# Patient Record
Sex: MALE | Race: WHITE | NOT HISPANIC OR LATINO | Employment: OTHER | URBAN - METROPOLITAN AREA
[De-identification: names, ages, dates, MRNs, and addresses within clinical notes are randomized per-mention and may not be internally consistent; named-entity substitution may affect disease eponyms.]

---

## 2017-11-08 ENCOUNTER — HOSPITAL ENCOUNTER (INPATIENT)
Facility: HOSPITAL | Age: 57
LOS: 18 days | Discharge: HOME | DRG: 913 | End: 2017-11-28
Attending: EMERGENCY MEDICINE | Admitting: INTERNAL MEDICINE
Payer: MEDICARE

## 2017-11-08 ENCOUNTER — APPOINTMENT (OUTPATIENT)
Dept: RADIOLOGY | Facility: HOSPITAL | Age: 57
DRG: 913 | End: 2017-11-08
Payer: MEDICARE

## 2017-11-08 DIAGNOSIS — N30.00 ACUTE CYSTITIS WITHOUT HEMATURIA: ICD-10-CM

## 2017-11-08 DIAGNOSIS — T17.500A MUCUS PLUGGING OF BRONCHI: ICD-10-CM

## 2017-11-08 DIAGNOSIS — R20.0 ANESTHESIA: ICD-10-CM

## 2017-11-08 DIAGNOSIS — S09.90XA HEAD INJURY: ICD-10-CM

## 2017-11-08 DIAGNOSIS — J69.0 ASPIRATION PNEUMONIA (HCC): ICD-10-CM

## 2017-11-08 DIAGNOSIS — J96.01 ACUTE RESPIRATORY FAILURE WITH HYPOXIA (HCC): ICD-10-CM

## 2017-11-08 DIAGNOSIS — E87.0 HYPERNATREMIA: ICD-10-CM

## 2017-11-08 DIAGNOSIS — F79 MENTAL RETARDATION: ICD-10-CM

## 2017-11-08 DIAGNOSIS — R29.6 FREQUENT FALLS: Primary | ICD-10-CM

## 2017-11-08 LAB
ALBUMIN SERPL BCP-MCNC: 2.8 G/DL (ref 3.5–5)
ALP SERPL-CCNC: 131 U/L (ref 46–116)
ALT SERPL W P-5'-P-CCNC: 51 U/L (ref 12–78)
ANION GAP SERPL CALCULATED.3IONS-SCNC: 13 MMOL/L (ref 4–13)
AST SERPL W P-5'-P-CCNC: 88 U/L (ref 5–45)
BASOPHILS # BLD AUTO: 0 THOUSANDS/ΜL (ref 0–0.1)
BASOPHILS NFR BLD AUTO: 0 % (ref 0–1)
BILIRUB SERPL-MCNC: 0.4 MG/DL (ref 0.2–1)
BUN SERPL-MCNC: 18 MG/DL (ref 5–25)
CALCIUM SERPL-MCNC: 9.4 MG/DL (ref 8.3–10.1)
CHLORIDE SERPL-SCNC: 109 MMOL/L (ref 100–108)
CO2 SERPL-SCNC: 28 MMOL/L (ref 21–32)
CREAT SERPL-MCNC: 0.3 MG/DL (ref 0.6–1.3)
EOSINOPHIL # BLD AUTO: 0 THOUSAND/ΜL (ref 0–0.61)
EOSINOPHIL NFR BLD AUTO: 0 % (ref 0–6)
ERYTHROCYTE [DISTWIDTH] IN BLOOD BY AUTOMATED COUNT: 15.9 % (ref 11.6–15.1)
GFR SERPL CREATININE-BSD FRML MDRD: 147 ML/MIN/1.73SQ M
GLUCOSE SERPL-MCNC: 104 MG/DL (ref 65–140)
HCT VFR BLD AUTO: 45.3 % (ref 42–52)
HGB BLD-MCNC: 14.9 G/DL (ref 14–18)
LYMPHOCYTES # BLD AUTO: 0.9 THOUSANDS/ΜL (ref 0.6–4.47)
LYMPHOCYTES NFR BLD AUTO: 8 % (ref 14–44)
MCH RBC QN AUTO: 28.4 PG (ref 27–31)
MCHC RBC AUTO-ENTMCNC: 32.8 G/DL (ref 31.4–37.4)
MCV RBC AUTO: 87 FL (ref 82–98)
MONOCYTES # BLD AUTO: 0.7 THOUSAND/ΜL (ref 0.17–1.22)
MONOCYTES NFR BLD AUTO: 7 % (ref 4–12)
NEUTROPHILS # BLD AUTO: 9.5 THOUSANDS/ΜL (ref 1.85–7.62)
NEUTS SEG NFR BLD AUTO: 85 % (ref 43–75)
NRBC BLD AUTO-RTO: 0 /100 WBCS
PLATELET # BLD AUTO: 208 THOUSANDS/UL (ref 130–400)
PLATELET BLD QL SMEAR: ADEQUATE
PMV BLD AUTO: 8.1 FL (ref 8.9–12.7)
POTASSIUM SERPL-SCNC: 4.4 MMOL/L (ref 3.5–5.3)
PROT SERPL-MCNC: 7.8 G/DL (ref 6.4–8.2)
RBC # BLD AUTO: 5.23 MILLION/UL (ref 4.7–6.1)
SODIUM SERPL-SCNC: 150 MMOL/L (ref 136–145)
TSH SERPL DL<=0.05 MIU/L-ACNC: 2.23 UIU/ML (ref 0.36–3.74)
WBC # BLD AUTO: 11.1 THOUSAND/UL (ref 4.8–10.8)

## 2017-11-08 PROCEDURE — 99284 EMERGENCY DEPT VISIT MOD MDM: CPT

## 2017-11-08 PROCEDURE — 36415 COLL VENOUS BLD VENIPUNCTURE: CPT | Performed by: EMERGENCY MEDICINE

## 2017-11-08 PROCEDURE — 84443 ASSAY THYROID STIM HORMONE: CPT | Performed by: EMERGENCY MEDICINE

## 2017-11-08 PROCEDURE — 80053 COMPREHEN METABOLIC PANEL: CPT | Performed by: EMERGENCY MEDICINE

## 2017-11-08 PROCEDURE — 93005 ELECTROCARDIOGRAM TRACING: CPT | Performed by: EMERGENCY MEDICINE

## 2017-11-08 PROCEDURE — 85025 COMPLETE CBC W/AUTO DIFF WBC: CPT | Performed by: EMERGENCY MEDICINE

## 2017-11-08 RX ORDER — PANTOPRAZOLE SODIUM 20 MG/1
20 TABLET, DELAYED RELEASE ORAL
Status: DISCONTINUED | OUTPATIENT
Start: 2017-11-09 | End: 2017-11-12

## 2017-11-08 RX ORDER — DIVALPROEX SODIUM 250 MG/1
750 TABLET, DELAYED RELEASE ORAL EVERY MORNING
Status: DISCONTINUED | OUTPATIENT
Start: 2017-11-09 | End: 2017-11-12

## 2017-11-08 RX ORDER — ESCITALOPRAM OXALATE 10 MG/1
20 TABLET ORAL DAILY
Status: DISCONTINUED | OUTPATIENT
Start: 2017-11-09 | End: 2017-11-28 | Stop reason: HOSPADM

## 2017-11-08 RX ORDER — ARIPIPRAZOLE 30 MG/1
30 TABLET ORAL DAILY
COMMUNITY

## 2017-11-08 RX ORDER — ACETAMINOPHEN 325 MG/1
650 TABLET ORAL EVERY 6 HOURS PRN
COMMUNITY
End: 2018-05-10

## 2017-11-08 RX ORDER — DIVALPROEX SODIUM 500 MG/1
750 TABLET, DELAYED RELEASE ORAL EVERY MORNING
COMMUNITY
End: 2017-11-28 | Stop reason: HOSPADM

## 2017-11-08 RX ORDER — LORAZEPAM 2 MG/ML
1 INJECTION INTRAMUSCULAR ONCE
Status: DISCONTINUED | OUTPATIENT
Start: 2017-11-08 | End: 2017-11-09

## 2017-11-08 RX ORDER — LORAZEPAM 2 MG/ML
0.5 INJECTION INTRAMUSCULAR EVERY 6 HOURS PRN
Status: DISCONTINUED | OUTPATIENT
Start: 2017-11-08 | End: 2017-11-12

## 2017-11-08 RX ORDER — ASPIRIN 81 MG/1
81 TABLET ORAL DAILY
COMMUNITY
End: 2018-05-10

## 2017-11-08 RX ORDER — DIVALPROEX SODIUM 250 MG/1
250 TABLET, DELAYED RELEASE ORAL
Status: DISCONTINUED | OUTPATIENT
Start: 2017-11-08 | End: 2017-11-12

## 2017-11-08 RX ORDER — DEXTROSE AND SODIUM CHLORIDE 5; .2 G/100ML; G/100ML
75 INJECTION, SOLUTION INTRAVENOUS CONTINUOUS
Status: DISCONTINUED | OUTPATIENT
Start: 2017-11-08 | End: 2017-11-09

## 2017-11-08 RX ORDER — ASPIRIN 81 MG/1
81 TABLET ORAL DAILY
Status: DISCONTINUED | OUTPATIENT
Start: 2017-11-09 | End: 2017-11-12

## 2017-11-08 RX ORDER — METOCLOPRAMIDE HYDROCHLORIDE 5 MG/ML
10 INJECTION INTRAMUSCULAR; INTRAVENOUS EVERY 8 HOURS SCHEDULED
Status: COMPLETED | OUTPATIENT
Start: 2017-11-08 | End: 2017-11-09

## 2017-11-08 RX ORDER — DIVALPROEX SODIUM 500 MG/1
500 TABLET, DELAYED RELEASE ORAL DAILY
COMMUNITY
End: 2017-11-28 | Stop reason: HOSPADM

## 2017-11-08 RX ORDER — ARIPIPRAZOLE 10 MG/1
30 TABLET ORAL DAILY
Status: DISCONTINUED | OUTPATIENT
Start: 2017-11-09 | End: 2017-11-28 | Stop reason: HOSPADM

## 2017-11-08 RX ORDER — ESCITALOPRAM OXALATE 20 MG/1
20 TABLET ORAL DAILY
COMMUNITY

## 2017-11-08 RX ORDER — KETOROLAC TROMETHAMINE 30 MG/ML
15 INJECTION, SOLUTION INTRAMUSCULAR; INTRAVENOUS EVERY 8 HOURS SCHEDULED
Status: COMPLETED | OUTPATIENT
Start: 2017-11-08 | End: 2017-11-11

## 2017-11-08 RX ORDER — DIVALPROEX SODIUM 500 MG/1
250 TABLET, DELAYED RELEASE ORAL
COMMUNITY
End: 2017-11-28 | Stop reason: HOSPADM

## 2017-11-08 RX ORDER — ATORVASTATIN CALCIUM 20 MG/1
20 TABLET, FILM COATED ORAL
Status: DISCONTINUED | OUTPATIENT
Start: 2017-11-08 | End: 2017-11-28 | Stop reason: HOSPADM

## 2017-11-08 RX ORDER — OMEPRAZOLE 20 MG/1
20 CAPSULE, DELAYED RELEASE ORAL
COMMUNITY
End: 2018-06-18

## 2017-11-08 RX ORDER — ATORVASTATIN CALCIUM 20 MG/1
20 TABLET, FILM COATED ORAL
COMMUNITY
End: 2018-05-10

## 2017-11-08 RX ORDER — DIVALPROEX SODIUM 250 MG/1
750 TABLET, DELAYED RELEASE ORAL
Status: DISCONTINUED | OUTPATIENT
Start: 2017-11-09 | End: 2017-11-12

## 2017-11-08 RX ORDER — DIVALPROEX SODIUM 500 MG/1
750 TABLET, DELAYED RELEASE ORAL
COMMUNITY
End: 2017-11-28 | Stop reason: HOSPADM

## 2017-11-08 RX ORDER — DIPHENHYDRAMINE HYDROCHLORIDE 50 MG/ML
25 INJECTION INTRAMUSCULAR; INTRAVENOUS EVERY 8 HOURS SCHEDULED
Status: COMPLETED | OUTPATIENT
Start: 2017-11-08 | End: 2017-11-09

## 2017-11-08 RX ORDER — ACETAMINOPHEN 325 MG/1
650 TABLET ORAL EVERY 6 HOURS PRN
Status: DISCONTINUED | OUTPATIENT
Start: 2017-11-08 | End: 2017-11-14

## 2017-11-08 RX ADMIN — KETOROLAC TROMETHAMINE 15 MG: 30 INJECTION, SOLUTION INTRAMUSCULAR at 22:03

## 2017-11-08 RX ADMIN — LORAZEPAM 0.5 MG: 2 INJECTION INTRAMUSCULAR; INTRAVENOUS at 21:20

## 2017-11-08 RX ADMIN — DIPHENHYDRAMINE HYDROCHLORIDE 25 MG: 50 INJECTION, SOLUTION INTRAMUSCULAR; INTRAVENOUS at 22:02

## 2017-11-08 RX ADMIN — DIVALPROEX SODIUM 250 MG: 250 TABLET, DELAYED RELEASE ORAL at 22:02

## 2017-11-08 RX ADMIN — ATORVASTATIN CALCIUM 20 MG: 20 TABLET, FILM COATED ORAL at 22:02

## 2017-11-08 RX ADMIN — METOCLOPRAMIDE 10 MG: 5 INJECTION, SOLUTION INTRAMUSCULAR; INTRAVENOUS at 22:04

## 2017-11-08 RX ADMIN — DEXTROSE AND SODIUM CHLORIDE 75 ML/HR: 5; .2 INJECTION, SOLUTION INTRAVENOUS at 21:13

## 2017-11-08 NOTE — H&P
H&P Exam - Sridhar Levine 61 y o  male MRN: [id number]    Unit/Bed#: ED 10 Encounter: [id number]      History of Present Illness     HPI:  Sridhar Levine is a 61 y o  male who presents with frequent falls  Patient is a resident of [facility] mentally retarded  Information is obtained from the records from [facility] and also from the physician who works there  Also information obtained from the emergency room physician and the records  Patient usually is independent ambulatory with no unsteadiness with his gait until it became apparent november 5th 2017  Patient had 3 head injuries on October 29th again on low blood 4th with left periorbital ecchymosis and again on no more 7 patient had an injury to the right forehead area with hematoma formation    Patient was sent to [facility] 2 times very had a CT scan done which came back unremarkable however there is a concern with his frequent falls and head injuries patient has been sent here for further evaluation and treatment  Patient has been evaluated in the emergency room and subsequently got admitted for further evaluation and treatment  There is no documentation of vomiting  In the emergency room patient has been very restless  Review of Systems   Reason unable to perform ROS: Patient with profound intellectual disability review of system could not be done  Historical Information   Past Medical History:   Diagnosis Date    Hyperlipidemia     Mental retardation     Osteoarthritis     Psychiatric disorder     atypical psychosis, impulse control disorder     History reviewed  No pertinent surgical history  History reviewed  No pertinent family history    Social History   History   Alcohol Use No     History   Drug Use No     History   Smoking Status    Never Smoker   Smokeless Tobacco    Never Used         Meds/Allergies     Allergies   Allergen Reactions    Haldol [Haloperidol]     Navane [Thiothixene]     Thorazine [Chlorpromazine]          (Not in a hospital admission)    Objective   Vitals: Pulse 104, temperature 97 8 °F (36 6 °C), temperature source Tympanic, resp  rate 20, weight 59 9 kg (132 lb)  No intake or output data in the 24 hours ending 11/08/17 1510    Invasive Devices          No matching active lines, drains, or airways          Physical Exam   Constitutional:   Patient is not a well-nourished is very restless   HENT:   Ecchymotic area noted on the occipital region 2 cm in diameter  Also ecchymotic areas noted on the frontal region both sides  Eyes: Right eye exhibits no discharge  Left eye exhibits no discharge  No scleral icterus  Patient has ecchymosis around the left eye pupils equal in size and reacting to light  Neck: Normal range of motion  Neck supple  No JVD present  No tracheal deviation present  No thyromegaly present  Cardiovascular: Normal rate and regular rhythm  Pulmonary/Chest: Effort normal and breath sounds normal    Abdominal: Soft  Bowel sounds are normal  He exhibits no distension  There is no tenderness  Musculoskeletal: He exhibits no edema, tenderness or deformity  Patient is moving all 4 extremities   Lymphadenopathy:     He has no cervical adenopathy  Neurological: He is alert  He has normal reflexes  No cranial nerve deficit  Coordination normal    Skin: Skin is warm and dry  Psychiatric: He has a normal mood and affect         Lab Results:   Recent Results (from the past 72 hour(s))   CBC and differential    Collection Time: 11/08/17  2:22 PM   Result Value Ref Range    WBC 11 10 (H) 4 80 - 10 80 Thousand/uL    RBC 5 23 4 70 - 6 10 Million/uL    Hemoglobin 14 9 14 0 - 18 0 g/dL    Hematocrit 45 3 42 0 - 52 0 %    MCV 87 82 - 98 fL    MCH 28 4 27 0 - 31 0 pg    MCHC 32 8 31 4 - 37 4 g/dL    RDW 15 9 (H) 11 6 - 15 1 %    MPV 8 1 (L) 8 9 - 12 7 fL    Platelets 461 398 - 973 Thousands/uL    nRBC 0 /100 WBCs    Neutrophils Relative 85 (H) 43 - 75 %    Lymphocytes Relative 8 (L) 14 - 44 %    Monocytes Relative 7 4 - 12 %    Eosinophils Relative 0 0 - 6 %    Basophils Relative 0 0 - 1 %    Neutrophils Absolute 9 50 (H) 1 85 - 7 62 Thousands/µL    Lymphocytes Absolute 0 90 0 60 - 4 47 Thousands/µL    Monocytes Absolute 0 70 0 17 - 1 22 Thousand/µL    Eosinophils Absolute 0 00 0 00 - 0 61 Thousand/µL    Basophils Absolute 0 00 0 00 - 0 10 Thousands/µL   TSH    Collection Time: 11/08/17  2:22 PM   Result Value Ref Range    TSH 3RD GENERATON 2 225 0 358 - 3 740 uIU/mL   Smear Review(Phlebs Do Not Order)    Collection Time: 11/08/17  2:22 PM   Result Value Ref Range    Platelet Estimate Adequate Adequate       Imaging:   XR chest 1 view portable    (Results Pending)       XR chest 1 view portable    (Results Pending)       EKG, Pathology, and Other Studies: I have personally reviewed pertinent reports  Assessment/Plan     Assessment:  Frequent falls resulting in head injury  Patient with unsteady gait  Mental retardation  Osteoarthritis  Hyperlipidemia  Hypernatremia    Plan:  Neuro checks  MRI  Neurology consultation  Resume the medications  IV fluids  Code Status: No Order  Advance Directive and Living Will:      Power of :    POLST:      Counseling / Coordination of Care  Total floor / unit time spent today 60 minutes  Greater than 50% of total time was spent with the patient and / or family counseling and / or coordination of care  A description of the counseling / coordination of care:  Discussed with the emergency room physician emergency room nurse staff on the floor discussed with the physician from the [facility] records reviewed  Marc Samaniego

## 2017-11-09 ENCOUNTER — APPOINTMENT (OUTPATIENT)
Dept: RADIOLOGY | Facility: HOSPITAL | Age: 57
DRG: 913 | End: 2017-11-09
Payer: MEDICARE

## 2017-11-09 LAB
ANION GAP SERPL CALCULATED.3IONS-SCNC: 10 MMOL/L (ref 4–13)
ATRIAL RATE: 111 BPM
BUN SERPL-MCNC: 22 MG/DL (ref 5–25)
CALCIUM SERPL-MCNC: 8.8 MG/DL (ref 8.3–10.1)
CHLORIDE SERPL-SCNC: 111 MMOL/L (ref 100–108)
CO2 SERPL-SCNC: 29 MMOL/L (ref 21–32)
CREAT SERPL-MCNC: 0.34 MG/DL (ref 0.6–1.3)
ERYTHROCYTE [DISTWIDTH] IN BLOOD BY AUTOMATED COUNT: 14.8 % (ref 11.6–15.1)
GFR SERPL CREATININE-BSD FRML MDRD: 140 ML/MIN/1.73SQ M
GLUCOSE SERPL-MCNC: 98 MG/DL (ref 65–140)
HCT VFR BLD AUTO: 38.7 % (ref 42–52)
HGB BLD-MCNC: 12.8 G/DL (ref 14–18)
MCH RBC QN AUTO: 28.4 PG (ref 27–31)
MCHC RBC AUTO-ENTMCNC: 33 G/DL (ref 31.4–37.4)
MCV RBC AUTO: 86 FL (ref 82–98)
P AXIS: 37 DEGREES
PLATELET # BLD AUTO: 145 THOUSANDS/UL (ref 130–400)
PMV BLD AUTO: 8.1 FL (ref 8.9–12.7)
POTASSIUM SERPL-SCNC: 4 MMOL/L (ref 3.5–5.3)
PR INTERVAL: 116 MS
QRS AXIS: -63 DEGREES
QRSD INTERVAL: 86 MS
QT INTERVAL: 352 MS
QTC INTERVAL: 478 MS
RBC # BLD AUTO: 4.5 MILLION/UL (ref 4.7–6.1)
SODIUM SERPL-SCNC: 150 MMOL/L (ref 136–145)
T WAVE AXIS: 81 DEGREES
VENTRICULAR RATE: 111 BPM
WBC # BLD AUTO: 8.4 THOUSAND/UL (ref 4.8–10.8)

## 2017-11-09 PROCEDURE — 85027 COMPLETE CBC AUTOMATED: CPT | Performed by: INTERNAL MEDICINE

## 2017-11-09 PROCEDURE — G8978 MOBILITY CURRENT STATUS: HCPCS

## 2017-11-09 PROCEDURE — G8997 SWALLOW GOAL STATUS: HCPCS

## 2017-11-09 PROCEDURE — 80048 BASIC METABOLIC PNL TOTAL CA: CPT | Performed by: INTERNAL MEDICINE

## 2017-11-09 PROCEDURE — 97163 PT EVAL HIGH COMPLEX 45 MIN: CPT

## 2017-11-09 PROCEDURE — G8996 SWALLOW CURRENT STATUS: HCPCS

## 2017-11-09 PROCEDURE — 70250 X-RAY EXAM OF SKULL: CPT

## 2017-11-09 PROCEDURE — 92610 EVALUATE SWALLOWING FUNCTION: CPT

## 2017-11-09 PROCEDURE — 71010 HB CHEST X-RAY 1 VIEW FRONTAL (PORTABLE): CPT

## 2017-11-09 PROCEDURE — G8979 MOBILITY GOAL STATUS: HCPCS

## 2017-11-09 RX ORDER — DEXTROSE MONOHYDRATE 50 MG/ML
75 INJECTION, SOLUTION INTRAVENOUS CONTINUOUS
Status: DISCONTINUED | OUTPATIENT
Start: 2017-11-09 | End: 2017-11-11

## 2017-11-09 RX ADMIN — DIVALPROEX SODIUM 250 MG: 250 TABLET, DELAYED RELEASE ORAL at 23:43

## 2017-11-09 RX ADMIN — ARIPIPRAZOLE 30 MG: 10 TABLET ORAL at 09:19

## 2017-11-09 RX ADMIN — ESCITALOPRAM OXALATE 20 MG: 10 TABLET ORAL at 09:17

## 2017-11-09 RX ADMIN — ASPIRIN 81 MG: 81 TABLET, COATED ORAL at 09:18

## 2017-11-09 RX ADMIN — KETOROLAC TROMETHAMINE 15 MG: 30 INJECTION, SOLUTION INTRAMUSCULAR at 06:33

## 2017-11-09 RX ADMIN — KETOROLAC TROMETHAMINE 15 MG: 30 INJECTION, SOLUTION INTRAMUSCULAR at 23:42

## 2017-11-09 RX ADMIN — KETOROLAC TROMETHAMINE 15 MG: 30 INJECTION, SOLUTION INTRAMUSCULAR at 15:20

## 2017-11-09 RX ADMIN — DIVALPROEX SODIUM 750 MG: 250 TABLET, DELAYED RELEASE ORAL at 09:18

## 2017-11-09 RX ADMIN — DIPHENHYDRAMINE HYDROCHLORIDE 25 MG: 50 INJECTION, SOLUTION INTRAMUSCULAR; INTRAVENOUS at 15:20

## 2017-11-09 RX ADMIN — ATORVASTATIN CALCIUM 20 MG: 20 TABLET, FILM COATED ORAL at 23:42

## 2017-11-09 RX ADMIN — DIPHENHYDRAMINE HYDROCHLORIDE 25 MG: 50 INJECTION, SOLUTION INTRAMUSCULAR; INTRAVENOUS at 06:33

## 2017-11-09 RX ADMIN — METOCLOPRAMIDE 10 MG: 5 INJECTION, SOLUTION INTRAMUSCULAR; INTRAVENOUS at 15:21

## 2017-11-09 RX ADMIN — METOCLOPRAMIDE 10 MG: 5 INJECTION, SOLUTION INTRAMUSCULAR; INTRAVENOUS at 06:32

## 2017-11-09 RX ADMIN — PANTOPRAZOLE SODIUM 20 MG: 20 TABLET, DELAYED RELEASE ORAL at 06:33

## 2017-11-09 RX ADMIN — LORAZEPAM 0.5 MG: 2 INJECTION INTRAMUSCULAR; INTRAVENOUS at 10:32

## 2017-11-09 RX ADMIN — DEXTROSE 75 ML/HR: 5 SOLUTION INTRAVENOUS at 12:47

## 2017-11-09 RX ADMIN — DIVALPROEX SODIUM 750 MG: 250 TABLET, DELAYED RELEASE ORAL at 15:29

## 2017-11-09 NOTE — NUTRITION
11/09/17 1129   Assessment   Timepoint Initial  (Non-violent Restraints)   Labs   List Completed Labs (Na: 150, Cl; 111, creat: 0 34  D5NS)   Feeding Route   PO Total feed   Swallow (noted liquids changed from thin to HTL this morning  Per 1:1, pt with difficulties with thin liquids at breakfast  ST following)   Adequacy of Intake   Nutrition Modality PO  (dysphagia 1- pureed, HTL)   Intake Meals %   Estimated Calorie Intake %   Estimated Protein Intake  %   Estimated Fluid Intake %   Estimated calorie intake compared to estimated need Observed breakfast tray with 100% Divehi toast, 50% cream of wheat  Will monitor     Nutrition Prognosis   Potential Fair   Nutrition Concerns (diet tolerance)   Comorbid Concerns None   Nutrition Precautions None   Nutrition Considerations (Education inappropriate d/t intellectual disability)   PES Statement   Problem Clinical   Functional (1) Swallowing difficulty NC-1 1   Related to Swallowing difficulty   As evidenced by: Need for Modified consistency   Patient Nutrition Goals   Goal weight maintained;meet PO needs;tolerate PO diet   Goal Status initiated   Timeframe to complete goal by next f/u   Recommendations/Interventions   Interventions Diet: continued as ordered   Nutrition Recommendations Continue diet as ordered   Nutrition Complexity Risk   Nutrition complexity level Moderate risk   Nutrition review: 11/13/17  (po intakes)   Follow up date 11/16/17

## 2017-11-09 NOTE — CONSULTS
Consultation - Nephrology   Germain Snell 61 y o  male MRN: [id number]  Unit/Bed#: [address] A Encounter: [id number]    ASSESSMENT and PLAN:  1  Hypernatremia: suspect due to poor oral free water intake  Will start D5W at 75 cc/hr  2  Developmental disability  3  Falls    SUMMARY OF RECOMMENDATIONS:  · Start IVF with D5W at 75 cc/hr  · Follow Na levels  Discussed with RN  HISTORY OF PRESENT ILLNESS:  Requesting Physician: Marie Moralez MD  Reason for Consult: Hypernatremia    Germain Snell is a 61 y o  male who was admitted to [facility] on 11/8/17 after presenting with frequent falls  A renal consultation is requested today for assistance in the management of hypernatremia  The information in this consult was obtained upon review of the electronic medical record as the patient is unable to give any type of meaningful history  From what I gather, the patient is a resident at the [facility] and is apparently ambulatory without gait issues at baseline  More recently, he has been having unsteady gait and has fallen multiple times prompting him to be brought to [facility] and subsequently admitted  He was found to have a Na of 150 on admission and we were consulted  He is currently on thickened liquids  Creatinine is stable at 0 3  PAST MEDICAL HISTORY:  Past Medical History:   Diagnosis Date    Deviated nasal septum     Eczema     Gastritis     Hyperlipidemia     Impulse control disorder     Insomnia     Mental retardation     Osteoarthritis     Psychiatric disorder     atypical psychosis, impulse control disorder    Ptosis of both eyelids     r > l    Scoliosis      PAST SURGICAL HISTORY:  History reviewed  No pertinent surgical history      ALLERGIES:  Allergies   Allergen Reactions    Haldol [Haloperidol]     Navane [Thiothixene]     Thorazine [Chlorpromazine]      SOCIAL HISTORY:  History   Alcohol Use No     History   Drug Use No     History   Smoking Status  Never Smoker   Smokeless Tobacco    Never Used     FAMILY HISTORY:  Cannot obtain due to mental status  MEDICATIONS:    Current Facility-Administered Medications:     acetaminophen (TYLENOL) tablet 650 mg, 650 mg, Oral, Q6H PRN, Johnathon Balderas MD    ARIPiprazole (ABILIFY) tablet 30 mg, 30 mg, Oral, Daily, Johnathon Balderas MD, 30 mg at 11/09/17 0919    aspirin (ECOTRIN LOW STRENGTH) EC tablet 81 mg, 81 mg, Oral, Daily, Johnathon Balderas MD, 81 mg at 11/09/17 0918    atorvastatin (LIPITOR) tablet 20 mg, 20 mg, Oral, HS, Johnathon Balderas MD, 20 mg at 11/08/17 2202    dextrose 5 % and sodium chloride 0 2 % infusion, 75 mL/hr, Intravenous, Continuous, Johnathon Balderas MD, Last Rate: 75 mL/hr at 11/08/17 2113, 75 mL/hr at 11/08/17 2113    diphenhydrAMINE (BENADRYL) injection 25 mg, 25 mg, Intravenous, Q8H [address], Alpesh Suarez MD, 25 mg at 11/09/17 4182    divalproex sodium (DEPAKOTE) EC tablet 250 mg, 250 mg, Oral, HS, Johnathon Balderas MD, 250 mg at 11/08/17 2202    divalproex sodium (DEPAKOTE) EC tablet 750 mg, 750 mg, Oral, QAM, Johnathon Balderas MD, 750 mg at 11/09/17 0918    divalproex sodium (DEPAKOTE) EC tablet 750 mg, 750 mg, Oral, After Lunch, Johnathon Balderas MD    escitalopram (LEXAPRO) tablet 20 mg, 20 mg, Oral, Daily, Johnathon Balderas MD, 20 mg at 11/09/17 0917    ketorolac (TORADOL) 30 mg/mL injection 15 mg, 15 mg, Intravenous, Q8H [address], Cyndee Mtz MD, 15 mg at 11/09/17 0633    LORazepam (ATIVAN) 2 mg/mL injection 0 5 mg, 0 5 mg, Intravenous, Q6H PRN, Johnathon Balderas MD, 0 5 mg at 11/09/17 1032    metoclopramide (REGLAN) injection 10 mg, 10 mg, Intravenous, Q8H [address], Alpesh Suarez MD, 10 mg at 11/09/17 5080    pantoprazole (PROTONIX) EC tablet 20 mg, 20 mg, Oral, Early Morning, Johnathon Balderas MD, 20 mg at 11/09/17 1786    REVIEW OF SYSTEMS:  Cannot obtain due to mental status       PHYSICAL EXAM:  Current Weight: Weight - Scale: 59 9 kg (132 lb) (transfer papers)  First Weight: Weight - Scale: 59 9 kg (132 lb) (transfer papers)  Vitals:    11/08/17 1309 11/08/17 1609 11/09/17 0700   BP:  113/69    Pulse: 104 (!) 108    Resp: 20 18    Temp: 97 8 °F (36 6 °C) 98 °F (36 7 °C)    TempSrc: Tympanic Tympanic    Weight: 59 9 kg (132 lb)     Height:   5' (1 524 m)     No intake or output data in the 24 hours ending 11/09/17 1221    General: asleep, comfortable, no distress  Skin: poor turgor  No rash  Eyes: pink conjunctivae, periorbital ecchymosis on the R    ENT: dry lips  Neck: no JVD  Chest/Lungs: clear breath sounds  CVS: distinct heart sounds, regular, normal rate  Abdomen: soft, non distended  Extremities: no edema  : deferred  Neuro: deferred  Psych: cannot obtain       Invasive Devices:      Lab Results:     Results from last 7 days  Lab Units 11/09/17  0609 11/08/17  1422   WBC Thousand/uL 8 40 11 10*   HEMOGLOBIN g/dL 12 8* 14 9   HEMATOCRIT % 38 7* 45 3   PLATELETS Thousands/uL 145 208   SODIUM mmol/L 150* 150*   POTASSIUM mmol/L 4 0 4 4   CHLORIDE mmol/L 111* 109*   CO2 mmol/L 29 28   BUN mg/dL 22 18   CREATININE mg/dL 0 34* 0 30*   CALCIUM mg/dL 8 8 9 4   ALBUMIN g/dL  --  2 8*   TOTAL PROTEIN g/dL  --  7 8   BILIRUBIN TOTAL mg/dL  --  0 40   ALK PHOS U/L  --  131*   ALT U/L  --  51   AST U/L  --  88*   GLUCOSE RANDOM mg/dL 98 104     Other Studies:

## 2017-11-09 NOTE — CASE MANAGEMENT
Initial Clinical Review    Admission: Date/Time/Statement: N/A @ N/A     Orders Placed This Encounter   Procedures    Place in Observation (expected length of stay for this patient is less than two midnights)     Standing Status:   Standing     Number of Occurrences:   1     Order Specific Question:   Admitting Physician     Answer:   Giles Vazquez     Order Specific Question:   Level of Care     Answer:   Med Surg [16]         ED: Date/Time/Mode of Arrival:   ED Arrival Information     Expected Arrival Acuity Means of Arrival Escorted By Service Admission Type    - 11/8/2017 12:56 Urgent Wheelchair Other General Medicine Urgent    Arrival Complaint    MULTIPLE HEAD INJURIES          Chief Complaint:   Chief Complaint   Patient presents with    Fall     Patient has had a few falls causing abrsions and eccymosis to head and face  Was seen at [facility] had CT scan of head X 2 (11/6/17 & 11/7/17)  Patient was sent to the ED today for further evaluation       History of Illness:  61 y o  male who presents with frequent falls  Patient is a resident of [facility] mentally retarded  Information is obtained from the records from [facility] and also from the physician who works there  Also information obtained from the emergency room physician and the records  Patient usually is independent ambulatory with no unsteadiness with his gait until it became apparent november 5th 2017  Patient had 3 head injuries on October 29th again on low blood 4th with left periorbital ecchymosis and again on no more 7 patient had an injury to the right forehead area with hematoma formation    Patient was sent to [facility] 2 times very had a CT scan done which came back unremarkable however there is a concern with his frequent falls and head injuries patient has been sent here for further evaluation and treatment    Patient has been evaluated in the emergency room and subsequently got admitted for further evaluation and treatment  There is no documentation of vomiting  In the emergency room patient has been very restless  Patient is not a well-nourished is very restless   Ecchymotic area noted on the occipital region 2 cm in diameter  Also ecchymotic areas noted on the frontal region both sides  Patient has ecchymosis around the left eye pupils equal in size and reacting to light  Patient is moving all 4 extremities   ED Vital Signs:   ED Triage Vitals   Temperature Pulse Respirations Blood Pressure SpO2   11/08/17 1309 11/08/17 1309 11/08/17 1309 11/08/17 1609 --   97 8 °F (36 6 °C) 104 20 113/69       Temp Source Heart Rate Source Patient Position - Orthostatic VS BP Location FiO2 (%)   11/08/17 1309 11/08/17 1309 11/08/17 1609 11/08/17 1609 --   Tympanic Apical Lying Right leg       Pain Score       11/08/17 1309       No Pain        Wt Readings from Last 1 Encounters:   11/08/17 59 9 kg (132 lb)       Vital Signs (abnormal): Abnormal Labs/Diagnostic Test Results:   CR 0 34 H/H 12 8/38 7   WBC 11>8 40    ED Treatment:   Medication Administration from 11/08/2017 1256 to 11/08/2017 1607     None          Past Medical/Surgical History: Active Ambulatory Problems     Diagnosis Date Noted    No Active Ambulatory Problems     Resolved Ambulatory Problems     Diagnosis Date Noted    No Resolved Ambulatory Problems     Past Medical History:   Diagnosis Date    Deviated nasal septum     Eczema     Gastritis     Hyperlipidemia     Impulse control disorder     Insomnia     Mental retardation     Osteoarthritis     Psychiatric disorder     Ptosis of both eyelids     Scoliosis        Admitting Diagnosis: Head injury [S09 90XA]  Frequent falls [R29 6]    Age/Sex: 61 y o  male    Assessment/Plan:   Assessment:  Frequent falls resulting in head injury  Patient with unsteady gait    Mental retardation  Osteoarthritis  Hyperlipidemia  Plan:  Neuro checks  MRI  Neurology consultation  Resume the medications  Code Status: No Order    PER NEURO   Assessment/Plan:  1  Multiple head injuries  2  Post concussion syndrome  3  Unsteady gait  4  Restless ness  Per caregiver patient is not himself  Thus far his tests at [facility] have returned normal  It's unclear whether fall occurred first or initial head injury resulted in balance problem  After trauma, head injury, typically patients experience headaches, dizziness, difficulty with balance from post concussion related injury to brain  This patient can't tell us what he's feeling so he may be restless     He will not allow oral meds so will place him on iv migraine cocktail for now to see if it eases his restlessness if it's from pain, headaches  Will order MRI williams with anesthesia   Meclizine prn  Supportive care per primary team          Admission Orders:  OBSERVATION  NEURO CHECKS Q4H  CONSULT NEURO1:1 OBSERVATION  IV ATIVAN X1  MRI BRAIN   CXR   UA CS  PT EVAL TX  Scheduled Meds:   ARIPiprazole 30 mg Oral Daily   aspirin 81 mg Oral Daily   atorvastatin 20 mg Oral HS   diphenhydrAMINE 25 mg Intravenous Q8H [address]   divalproex sodium 250 mg Oral HS   divalproex sodium 750 mg Oral QAM   divalproex sodium 750 mg Oral After Lunch   escitalopram 20 mg Oral Daily   ketorolac 15 mg Intravenous Q8H [address]   LORazepam 1 mg Intravenous Once   metoclopramide 10 mg Intravenous Q8H [address]   pantoprazole 20 mg Oral Early Morning     Continuous Infusions:   dextrose 5 % and sodium chloride 0 2 % 75 mL/hr Last Rate: 75 mL/hr (11/08/17 2113)     PRN Meds:   acetaminophen    LORazepam

## 2017-11-09 NOTE — PLAN OF CARE
Problem: DISCHARGE PLANNING - CARE MANAGEMENT  Goal: Discharge to post-acute care or home with appropriate resources  INTERVENTIONS:  - Conduct assessment to determine patient/family and health care team treatment goals, and need for post-acute services based on payer coverage, community resources, and patient preferences, and barriers to discharge  - Address psychosocial, clinical, and financial barriers to discharge as identified in assessment in conjunction with the patient/family and health care team  - Arrange appropriate level of post-acute services according to patient's   needs and preference and payer coverage in collaboration with the physician and health care team  - Communicate with and update the patient/family, physician, and health care team regarding progress on the discharge plan  - Arrange appropriate transportation to post-acute venues  DCP IS TO RETURN TO [address] [place] [address]  Outcome: Progressing

## 2017-11-09 NOTE — SPEECH THERAPY NOTE
SLP  Bedside Swallow Evaluation       11/09/17 1000   Patient Information   Current Medical 61year-old male admitted secondary to fall  Past Medical History MR, frequent falls   Social/ Educational/ Vocational History Patient from [facility]  Swallow Information   Current Risks for Dysphagia & Aspiration Cognitive deficit   Current Symptoms/ Concerns History of dysphagia; Cough with liquids during meals   Current Diet Dysphagia 1/ Puree; Thin liquids   Baseline Diet Dysphagia 1/ Puree; Thin liquids   Baseline Assessment   Behavior/Cognition Confused; Agitated; Requires cueing; Doesn't follow directions   Speech/Language Status Non-verbal   Patient Positioning Upright in bed   Swallow Mechanism Exam   Labial Strength WFL   Labial ROM WFL   Lingual Strength Mildly reduced   Lingual ROM Mildly reduced   Velum WFL   Mandible WFL   Dentition Nearly edentulous   Volitional Cough Weak; Wet and congested   Consistencies Assessed and Performance   Materials Administered Puree/Level 1; Thin liquid; Honey thick liquid   Oral Stage Moderately impaired   Oral Stage Comment Reduced oral control of thin liquid, suspect early spillage   Pharyngeal Stage Moderate to severely impaired; Aspiration risk   Pharyngeal Stage Comment Patient displayed post swallow cough with thin liquid  No s/s of aspiration seen following all swallows of puree or honey thick liquid  Swallow Mechanics Mild to moderately delayed swallow initation; Good laryngeal rise; Aspiration risk   Esophageal Concerns No s/s reported   Strategies and Efficacy Small bites/ sips, slow pace   Summary   Swallow Summary Oral pharyngeal swallow skills are safe/ [facility] for pureed solids and honey thick liquids  Recommendations   Risk for Aspiration None   Recommendations Oral diet;  Dysphagia treatment   Diet Solid Recommendation Level 1 Dysphagia/ Puree   Diet Liquid Recommendation Honey Thick liquid   Recommended Form of Medications Whole or crushed with a puree bolus General Precautions Aspiration precautions; Feed only when alert; Upright as possible for all oral intakes; Remain upright for 45 minutes after meals; Supervision with meals; Assist with feeding   Compensatory Swallowing Strategies Alternate solids and liquids; External pacing; No straws;  Small bites/ sips, slow pace   Further Evaluations Dietitian   Results Reviewed with Patient's sitter; RN; MD   Treatment Recommendations   Duration of treatment 3-5 times a week   Follow up treatments Assure diet tolerance; Patient/ family education   Dysphagia Goals Patient will tolerate recommended diet/ liquid consistency without s/s of oral/ pharyngeal dysphagia or aspiration  2 weeks  LTG:  Safe swallow skills for nutrition needs  2 weeks  Speech Therapy Prognosis   Prognosis Good   Prognosis Considerations Co-Morbidities;  Potential; Previous Level of Function; Severity of Impairments

## 2017-11-09 NOTE — CONSULTS
[address]   Neurology Initial Consult    Roberth Feng is a 61 y o  male  [address] 204/2 7837 Bernabe Graeme obtained from:   Chief Complaint   Patient presents with   Vena Rubén Fall     Patient has had a few falls causing abrsions and eccymosis to head and face  Was seen at [facility] had CT scan of head X 2 (11/6/17 & 11/7/17)  Patient was sent to the ED today for further evaluation         Assessment/Plan:    1  Multiple head injuries  2  Post concussion syndrome  3  Unsteady gait  4  Restless ness    Per caregiver patient is not himself  Thus far his tests at [facility] have returned normal  It's unclear whether fall occurred first or initial head injury resulted in balance problem  After trauma, head injury, typically patients experience headaches, dizziness, difficulty with balance from post concussion related injury to brain  This patient can't tell us what he's feeling so he may be restless  He will not allow oral meds so will place him on iv migraine cocktail for now to see if it eases his restlessness if it's from pain, headaches  Will order MRI williams with anesthesia   Meclizine prn  Supportive care per primary team             HPI:  Roberth Feng is a 60 yo M with PMH of impulse control disorder, MR, mood disorder presents from [facility] with concern of frequent falls  Per caregiver at [facility] facility, patient has had multiple falls within past week  Patient has developed periorbital ecchymosis and minor scalp hematoma from each fall  He has suffered main head injury on Oct 29th  Since then he had been walking unsteadily  He had CT brain 2 days ago at [facility] which was essentially normal  At baseline he's not able to communicate verbally but is able to independently ambulate  Patient is very restless  He wouldn't be able to tell use if he was in pain or had headache  There is no report of fever, chills, rash  There is no report of seizure like activity         Past Medical History:   Diagnosis Date    Deviated nasal septum     Eczema     Gastritis     Hyperlipidemia     Impulse control disorder     Insomnia     Mental retardation     Osteoarthritis     Psychiatric disorder     atypical psychosis, impulse control disorder    Ptosis of both eyelids     r > l    Scoliosis        History reviewed  No pertinent surgical history      Allergies   Allergen Reactions    Haldol [Haloperidol]     Navane [Thiothixene]     Thorazine [Chlorpromazine]          Current Facility-Administered Medications:     acetaminophen (TYLENOL) tablet 650 mg, 650 mg, Oral, Q6H PRN, Johnathon Balderas MD    [START ON 11/9/2017] ARIPiprazole (ABILIFY) tablet 30 mg, 30 mg, Oral, Daily, Johnathon Balderas MD    [START ON 11/9/2017] aspirin (ECOTRIN LOW STRENGTH) EC tablet 81 mg, 81 mg, Oral, Daily, Johnathon Balderas MD    atorvastatin (LIPITOR) tablet 20 mg, 20 mg, Oral, HS, Johnathon Balderas MD    dextrose 5 % and sodium chloride 0 2 % infusion, 75 mL/hr, Intravenous, Continuous, Johnathon Balderas MD    diphenhydrAMINE (BENADRYL) injection 25 mg, 25 mg, Intravenous, Q8H [address], Aicha Durán MD    divalproex sodium (DEPAKOTE) EC tablet 250 mg, 250 mg, Oral, HS, Johnathon Balderas MD    [START ON 11/9/2017] divalproex sodium (DEPAKOTE) EC tablet 750 mg, 750 mg, Oral, QAM, Johnathon Balderas MD    [START ON 11/9/2017] divalproex sodium (DEPAKOTE) EC tablet 750 mg, 750 mg, Oral, After Lunch, Johnathon Balderas MD    [START ON 11/9/2017] escitalopram (LEXAPRO) tablet 20 mg, 20 mg, Oral, Daily, Johnathon Balderas MD    ketorolac (TORADOL) 30 mg/mL injection 15 mg, 15 mg, Intravenous, Q8H [address], Aicha Durán MD    metoclopramide (REGLAN) injection 10 mg, 10 mg, Intravenous, Q8H [address], Aicha Durán MD    [START ON 11/9/2017] pantoprazole (PROTONIX) EC tablet 20 mg, 20 mg, Oral, Early Morning, Johnathon Balderas MD    Social History     Social History    Marital status: Single     Spouse name: N/A    Number of children: N/A    Years of education: N/A     Occupational History    Not on file  Social History Main Topics    Smoking status: Never Smoker    Smokeless tobacco: Never Used    Alcohol use No    Drug use: No    Sexual activity: Not on file     Other Topics Concern    Not on file     Social History Narrative    No narrative on file       History reviewed  No pertinent family history  Review of systems:  Please see HPI for positive symptoms  Nathaniel Moralez      Physical examination:  Vitals:    11/08/17 1609   BP: 113/69   Pulse: (!) 108   Resp: 18   Temp: 98 °F (36 7 °C)       GENERAL APPEARANCE:  The patient is alert, oriented  He is in no acute distress  HEENT:  Head is normocephalic  The sinuses are otherwise nontender  Pupils are equal and reactive  NECK:  Supple without lymphadenopathy  HEART:  Regular rate and rhythm  LUNGS:  clear to auscultation  No crackles or wheezes are heard  ABDOMEN:  Soft, nontender, nondistended with good bowel sounds heard  EXTREMITIES:  Without cyanosis, clubbing or edema  Mental status: The patient is thrashing around in bed  At baseline can't communicate  Cranial nerves:  CN II: Visual fields are full to confrontation  Very difficult exam patient is constantly moving side ways and thrashing around in bed  In restraints  Fundoscopic exam is not possible  Pupils are 3 mm and reactive to light  Limited CN exam: patient doesn't follow for EOM  Intact corneal, gag   Motor: There is no pronator drift of out-stretched arms    Muscle bulk and tone are normal      Muscle exam:  Patient is moving all 4 equally      Reflexes   RJ BJ TJ KJ AJ Plantars Kyle's   Right 2+ 2+ 2+ 2+ 2+ Downgoing Not present   Left 2+ 2+ 2+ 2+ 2+ Downgoing Not present     Sensory:  Grossly normal   Coordination:  janna  Gait/Stance:  Janna    Lab Results   Component Value Date    WBC 11 10 (H) 11/08/2017    HGB 14 9 11/08/2017    HCT 45 3 11/08/2017    MCV 87 11/08/2017     11/08/2017     No results found for: HGBA1C  Lab Results   Component Value Date    ALT 51 11/08/2017    AST 88 (H) 11/08/2017    ALKPHOS 131 (H) 11/08/2017    BILITOT 0 40 11/08/2017     Lab Results   Component Value Date    GLUCOSE 104 11/08/2017    CALCIUM 9 4 11/08/2017     (H) 11/08/2017    K 4 4 11/08/2017    CO2 28 11/08/2017     (H) 11/08/2017    BUN 18 11/08/2017    CREATININE 0 30 (L) 11/08/2017         Radiology          Review of reports and notes reveal:    osf ct brain on 11/6  Negative for acute process although limited by motion artifact       Independent Interpretation of images or specimens:  No results found  Thank you for this consult  Total time of encounter: 55 min   More than 50% of time was spent in counseling and coordination of care of patient  LISA Bell  [facility]  [facility] [address]  [address]

## 2017-11-09 NOTE — PROGRESS NOTES
Progress Note - Jae Beauchamp 61 y o  male MRN: [id number]    Unit/Bed#: 2 [place] [address] A Encounter: [id number]        Subjective:   Patient is confused disoriented periods of agitation  Also it appears patient is having difficulty with swallowing  Neurology note appreciated            Review of Systems    Objective:     Vitals: Blood pressure 113/69, pulse (!) 108, temperature 98 °F (36 7 °C), temperature source Tympanic, resp  rate 18, height 5' (1 524 m), weight 59 9 kg (132 lb)  ,Body mass index is 25 78 kg/m²      No intake or output data in the 24 hours ending 11/09/17 0936      Current Facility-Administered Medications:     acetaminophen (TYLENOL) tablet 650 mg, 650 mg, Oral, Q6H PRN, Johnathon Balderas MD    ARIPiprazole (ABILIFY) tablet 30 mg, 30 mg, Oral, Daily, Johnathon Balderas MD, 30 mg at 11/09/17 0919    aspirin (ECOTRIN LOW STRENGTH) EC tablet 81 mg, 81 mg, Oral, Daily, Johnathon Balderas MD, 81 mg at 11/09/17 0918    atorvastatin (LIPITOR) tablet 20 mg, 20 mg, Oral, HS, Johnathon Balderas MD, 20 mg at 11/08/17 2202    dextrose 5 % and sodium chloride 0 2 % infusion, 75 mL/hr, Intravenous, Continuous, Johnathon Balderas MD, Last Rate: 75 mL/hr at 11/08/17 2113, 75 mL/hr at 11/08/17 2113    diphenhydrAMINE (BENADRYL) injection 25 mg, 25 mg, Intravenous, Q8H [address], Beatris Seo MD, 25 mg at 11/09/17 2847    divalproex sodium (DEPAKOTE) EC tablet 250 mg, 250 mg, Oral, HS, Johnathon Balderas MD, 250 mg at 11/08/17 2202    divalproex sodium (DEPAKOTE) EC tablet 750 mg, 750 mg, Oral, QAM, Johnathon Balderas MD, 750 mg at 11/09/17 0918    divalproex sodium (DEPAKOTE) EC tablet 750 mg, 750 mg, Oral, After Lunch, Johnathon Balderas MD    escitalopram (LEXAPRO) tablet 20 mg, 20 mg, Oral, Daily, Johnathon Balderas MD, 20 mg at 11/09/17 0917    ketorolac (TORADOL) 30 mg/mL injection 15 mg, 15 mg, Intravenous, Q8H [address], Cyndee Steve MD, 15 mg at 11/09/17 0633    LORazepam (ATIVAN) 2 mg/mL injection 0 5 mg, 0 5 mg, Intravenous, Q6H PRN, Johnathon Balderas MD, 0 5 mg at 11/08/17 2120    LORazepam (ATIVAN) 2 mg/mL injection 1 mg, 1 mg, Intravenous, Once, Johnathon Balderas MD    metoclopramide (REGLAN) injection 10 mg, 10 mg, Intravenous, Q8H [address], Jim Swift MD, 10 mg at 11/09/17 6060    pantoprazole (PROTONIX) EC tablet 20 mg, 20 mg, Oral, Early Morning, Johnathon Balderas MD, 20 mg at 11/09/17 6359     Physical Exam   Constitutional:   Periods of agitation   HENT:   Ecchymotic areas with lump noted on the forehead  Also patient has an abrasion on the occipital area   Eyes: Conjunctivae are normal    Neck: Neck supple  Cardiovascular: Normal rate and regular rhythm  Pulmonary/Chest:   Transmitted sounds heard   Abdominal: Soft  Bowel sounds are normal  He exhibits no distension  There is no tenderness  Musculoskeletal: Normal range of motion  He exhibits no edema or deformity  Neurological: He is alert  He displays normal reflexes  No cranial nerve deficit  Skin: Skin is warm and dry     Psychiatric:   Periods of agitation           Lab, Imaging and culture:     Recent Results (from the past 72 hour(s))   CBC and differential    Collection Time: 11/08/17  2:22 PM   Result Value Ref Range    WBC 11 10 (H) 4 80 - 10 80 Thousand/uL    RBC 5 23 4 70 - 6 10 Million/uL    Hemoglobin 14 9 14 0 - 18 0 g/dL    Hematocrit 45 3 42 0 - 52 0 %    MCV 87 82 - 98 fL    MCH 28 4 27 0 - 31 0 pg    MCHC 32 8 31 4 - 37 4 g/dL    RDW 15 9 (H) 11 6 - 15 1 %    MPV 8 1 (L) 8 9 - 12 7 fL    Platelets 128 838 - 428 Thousands/uL    nRBC 0 /100 WBCs    Neutrophils Relative 85 (H) 43 - 75 %    Lymphocytes Relative 8 (L) 14 - 44 %    Monocytes Relative 7 4 - 12 %    Eosinophils Relative 0 0 - 6 %    Basophils Relative 0 0 - 1 %    Neutrophils Absolute 9 50 (H) 1 85 - 7 62 Thousands/µL    Lymphocytes Absolute 0 90 0 60 - 4 47 Thousands/µL    Monocytes Absolute 0 70 0 17 - 1 22 Thousand/µL    Eosinophils Absolute 0 00 0 00 - 0 61 Thousand/µL Basophils Absolute 0 00 0 00 - 0 10 Thousands/µL   Comprehensive metabolic panel    Collection Time: 11/08/17  2:22 PM   Result Value Ref Range    Sodium 150 (H) 136 - 145 mmol/L    Potassium 4 4 3 5 - 5 3 mmol/L    Chloride 109 (H) 100 - 108 mmol/L    CO2 28 21 - 32 mmol/L    Anion Gap 13 4 - 13 mmol/L    BUN 18 5 - 25 mg/dL    Creatinine 0 30 (L) 0 60 - 1 30 mg/dL    Glucose 104 65 - 140 mg/dL    Calcium 9 4 8 3 - 10 1 mg/dL    AST 88 (H) 5 - 45 U/L    ALT 51 12 - 78 U/L    Alkaline Phosphatase 131 (H) 46 - 116 U/L    Total Protein 7 8 6 4 - 8 2 g/dL    Albumin 2 8 (L) 3 5 - 5 0 g/dL    Total Bilirubin 0 40 0 20 - 1 00 mg/dL    eGFR 147 ml/min/1 73sq m   TSH    Collection Time: 11/08/17  2:22 PM   Result Value Ref Range    TSH 3RD GENERATON 2 225 0 358 - 3 740 uIU/mL   Smear Review(Phlebs Do Not Order)    Collection Time: 11/08/17  2:22 PM   Result Value Ref Range    Platelet Estimate Adequate Adequate   Basic metabolic panel    Collection Time: 11/09/17  6:09 AM   Result Value Ref Range    Sodium 150 (H) 136 - 145 mmol/L    Potassium 4 0 3 5 - 5 3 mmol/L    Chloride 111 (H) 100 - 108 mmol/L    CO2 29 21 - 32 mmol/L    Anion Gap 10 4 - 13 mmol/L    BUN 22 5 - 25 mg/dL    Creatinine 0 34 (L) 0 60 - 1 30 mg/dL    Glucose 98 65 - 140 mg/dL    Calcium 8 8 8 3 - 10 1 mg/dL    eGFR 140 ml/min/1 73sq m   CBC (With Platelets)    Collection Time: 11/09/17  6:09 AM   Result Value Ref Range    WBC 8 40 4 80 - 10 80 Thousand/uL    RBC 4 50 (L) 4 70 - 6 10 Million/uL    Hemoglobin 12 8 (L) 14 0 - 18 0 g/dL    Hematocrit 38 7 (L) 42 0 - 52 0 %    MCV 86 82 - 98 fL    MCH 28 4 27 0 - 31 0 pg    MCHC 33 0 31 4 - 37 4 g/dL    RDW 14 8 11 6 - 15 1 %    Platelets 918 161 - 419 Thousands/uL    MPV 8 1 (L) 8 9 - 12 7 fL       XR chest 1 view portable    (Results Pending)   MRI inpatient order    (Results Pending)       Invasive Devices     Peripheral Intravenous Line            Peripheral IV 11/08/17 Right Forearm less than 1 day Assessment:  Multiple head injuries  Frequent falls unsteady gait  Post concussion syndrome  Hypernatremia  Swallowing problems    Plan:  Patient is for MRI today  Nephrology consultation for hypernatremia  Swallowing evaluation  [place]  Discussed with the nursing staff

## 2017-11-09 NOTE — MALNUTRITION/BMI
This medical record reflects one or more clinical indicators suggestive of malnutrition and/or morbid obesity  Please indicate the one diagnosis below which you feel best reflects the clinical picture  Malnutrition Findings:   acute illness  malnutrition of moderate degree     Moderate malnutrition r/t acute illness as evidenced by slightly depressed temples, somewhat dark orbitals    BMI Findings:  25-29 9    Body mass index is 25 78 kg/m²  See Nutrition note dated 11/9/17 for additional details  Completed nutrition assessment is viewable in the nutrition documentation

## 2017-11-09 NOTE — PLAN OF CARE
Problem: SLP ADULT - SWALLOWING, IMPAIRED  Goal: Initial SLP swallow eval performed  Outcome: Completed Date Met: 11/09/17

## 2017-11-09 NOTE — PHYSICAL THERAPY NOTE
PT EVALUATION       11/09/17 1001   Pain Assessment   Pain Assessment [place] FACES   Pain Score No Pain   Home Living   Type of Home (resident [place])   Prior Function   Level of [place] (ambulatory )   Lives With Facility staff   Falls in the last 6 months (yes)   Restrictions/Precautions   Other Precautions 1:1;Cognitive; Chair Alarm; Bed Alarm; Impulsive;Combative;Agitated; Fall Risk   General   Additional Pertinent History Pt had a fall with a head injury 10/29/2017 and has since had recurrent falls and unsteady gait  Cognition   Overall Cognitive Status Impaired   Arousal/Participation (agitated)   Following Commands Unable to follow one step commands   Comments pt is not able to communicate at baseline   RUE Assessment   RUE Assessment (AROM noted all joints)   LUE Assessment   LUE Assessment (AROM noted all joints)   RLE Assessment   RLE Assessment (AROM noted all joints)   LLE Assessment   LLE Assessment (AROM noted all joints)   Coordination   Movements are Fluid and Coordinated (pt thrashing in bed)   Bed Mobility   Rolling R 2  Maximal assistance   Rolling L 2  Maximal assistance   Supine to Sit 2  Maximal assistance   Additional items Assist x 2   Sit to Supine 2  Maximal assistance   Additional items Assist x 2   Transfers   Sit to Stand 2  Maximal assistance   Additional items Assist x 2   Stand to Sit 2  Maximal assistance   Additional items Assist x 2   Additional Comments Pt in bed with mitts on hands, posey belt thrashing in bed with 1:1 aide   Ambulation/Elevation   Gait Assistance Not tested   Balance   Static Sitting Poor   Dynamic Sitting Poor   Static Standing Poor   Dynamic Standing Poor   Activity Tolerance   Activity Tolerance Treatment limited secondary to agitation   Assessment   Prognosis Good   Problem List Decreased strength;Decreased endurance; Impaired balance;Decreased mobility; Decreased cognition; Impaired judgement;Decreased safety awareness Assessment Patient seen for Physical Therapy evaluation  Patient admitted with Head injury  Comorbidities affecting patient's physical performance include: MR, impulse control disorder, mood disorder  Personal factors affecting patient at time of initial evaluation include: communication issues, inability to ambulate household distances, decreased cognition, positive fall history, decreased initiation and engagement, impulsivity and decreased social skills  Prior to admission, patient was independent with functional mobility without assistive device  Please find objective findings from Physical Therapy assessment regarding body systems outlined above with impairments and limitations including impaired balance, gait deviations, decreased activity tolerance, decreased functional mobility tolerance, decreased safety awareness, impaired judgement, fall risk and decreased cognition  The Barthel Index was used as a functional outcome tool presenting with a score of 0 today indicating marked limitations of functional mobility and ADLS  Patient's clinical presentation is currently unstable/unpredictable as seen in patient's presentation of varying levels of cognitive performance, increased fall risk and new onset of impairment of functional mobility  Pt would benefit from continued Physical Therapy treatment to address deficits as defined above and maximize level of functional mobility  As demonstrated by objective findings, the assigned level of complexity for this evaluation is high  Goals   Patient Goals unable to state   STG Expiration Date (1-7 days)   Short Term Goal #1 mod assist bed mobility, mod assist transfers, mod assist x 2 to ambulate 50 feet   LTG Expiration Date (1-2 weeks)   Long Term Goal #1 min assist bed mobility, min assist transfers, min assist ambulation 100 feet  (no falls)   Plan   Treatment/Interventions ADL retraining;Functional transfer training;LE strengthening/ROM; Therapeutic exercise;Gait training;Bed mobility; Patient/family training   PT Frequency (3-5 x/week/TRIAL of PT)   Recommendation   Recommendation (return to [facility])   Barthel Index   Feeding 0   Bathing 0   Grooming Score 0   Dressing Score 0   Bladder Score 0   Bowels Score 0   Toilet Use Score 0   Transfers (Bed/Chair) Score 0   Mobility (Level Surface) Score 0   Stairs Score 0   Barthel Index Score 0

## 2017-11-09 NOTE — SOCIAL WORK
PT IS FROM [facility], DCP IS TO RETURN TO [address] [place] [address] WHEN STABLE

## 2017-11-10 ENCOUNTER — APPOINTMENT (OUTPATIENT)
Dept: RADIOLOGY | Facility: HOSPITAL | Age: 57
DRG: 913 | End: 2017-11-10
Payer: MEDICARE

## 2017-11-10 ENCOUNTER — ANESTHESIA (OUTPATIENT)
Dept: RADIOLOGY | Facility: HOSPITAL | Age: 57
DRG: 913 | End: 2017-11-10
Payer: MEDICARE

## 2017-11-10 ENCOUNTER — ANESTHESIA EVENT (OUTPATIENT)
Dept: RADIOLOGY | Facility: HOSPITAL | Age: 57
DRG: 913 | End: 2017-11-10
Payer: MEDICARE

## 2017-11-10 LAB
AMMONIA PLAS-SCNC: 30 UMOL/L (ref 11–35)
ANION GAP SERPL CALCULATED.3IONS-SCNC: 12 MMOL/L (ref 4–13)
BUN SERPL-MCNC: 30 MG/DL (ref 5–25)
CALCIUM SERPL-MCNC: 8.8 MG/DL (ref 8.3–10.1)
CHLORIDE SERPL-SCNC: 105 MMOL/L (ref 100–108)
CO2 SERPL-SCNC: 27 MMOL/L (ref 21–32)
CREAT SERPL-MCNC: 0.44 MG/DL (ref 0.6–1.3)
GFR SERPL CREATININE-BSD FRML MDRD: 126 ML/MIN/1.73SQ M
GLUCOSE SERPL-MCNC: 88 MG/DL (ref 65–140)
POTASSIUM SERPL-SCNC: 4.3 MMOL/L (ref 3.5–5.3)
SODIUM SERPL-SCNC: 144 MMOL/L (ref 136–145)
VALPROATE SERPL-MCNC: 45 UG/ML (ref 50–100)

## 2017-11-10 PROCEDURE — 70553 MRI BRAIN STEM W/O & W/DYE: CPT

## 2017-11-10 PROCEDURE — 80048 BASIC METABOLIC PNL TOTAL CA: CPT | Performed by: INTERNAL MEDICINE

## 2017-11-10 PROCEDURE — 82140 ASSAY OF AMMONIA: CPT | Performed by: INTERNAL MEDICINE

## 2017-11-10 PROCEDURE — A9585 GADOBUTROL INJECTION: HCPCS | Performed by: INTERNAL MEDICINE

## 2017-11-10 PROCEDURE — 80164 ASSAY DIPROPYLACETIC ACD TOT: CPT | Performed by: INTERNAL MEDICINE

## 2017-11-10 RX ORDER — PROPOFOL 10 MG/ML
INJECTION, EMULSION INTRAVENOUS CONTINUOUS PRN
Status: DISCONTINUED | OUTPATIENT
Start: 2017-11-10 | End: 2017-11-10 | Stop reason: SURG

## 2017-11-10 RX ORDER — SODIUM CHLORIDE 9 MG/ML
INJECTION, SOLUTION INTRAVENOUS CONTINUOUS PRN
Status: DISCONTINUED | OUTPATIENT
Start: 2017-11-10 | End: 2017-11-10 | Stop reason: SURG

## 2017-11-10 RX ORDER — PROPOFOL 10 MG/ML
INJECTION, EMULSION INTRAVENOUS AS NEEDED
Status: DISCONTINUED | OUTPATIENT
Start: 2017-11-10 | End: 2017-11-10 | Stop reason: SURG

## 2017-11-10 RX ADMIN — DEXTROSE 75 ML/HR: 5 SOLUTION INTRAVENOUS at 04:10

## 2017-11-10 RX ADMIN — LORAZEPAM 0.5 MG: 2 INJECTION INTRAMUSCULAR; INTRAVENOUS at 00:24

## 2017-11-10 RX ADMIN — ARIPIPRAZOLE 30 MG: 10 TABLET ORAL at 15:17

## 2017-11-10 RX ADMIN — KETOROLAC TROMETHAMINE 15 MG: 30 INJECTION, SOLUTION INTRAMUSCULAR at 15:16

## 2017-11-10 RX ADMIN — PROPOFOL 100 MCG/KG/MIN: 10 INJECTION, EMULSION INTRAVENOUS at 13:44

## 2017-11-10 RX ADMIN — DIVALPROEX SODIUM 250 MG: 250 TABLET, DELAYED RELEASE ORAL at 21:24

## 2017-11-10 RX ADMIN — KETOROLAC TROMETHAMINE 15 MG: 30 INJECTION, SOLUTION INTRAMUSCULAR at 06:50

## 2017-11-10 RX ADMIN — SODIUM CHLORIDE: 0.9 INJECTION, SOLUTION INTRAVENOUS at 13:44

## 2017-11-10 RX ADMIN — GADOBUTROL 5.99 ML: 604.72 INJECTION INTRAVENOUS at 14:39

## 2017-11-10 RX ADMIN — DIVALPROEX SODIUM 750 MG: 250 TABLET, DELAYED RELEASE ORAL at 15:16

## 2017-11-10 RX ADMIN — ATORVASTATIN CALCIUM 20 MG: 20 TABLET, FILM COATED ORAL at 21:24

## 2017-11-10 RX ADMIN — ESCITALOPRAM OXALATE 20 MG: 10 TABLET ORAL at 15:17

## 2017-11-10 RX ADMIN — DIVALPROEX SODIUM 750 MG: 250 TABLET, DELAYED RELEASE ORAL at 10:39

## 2017-11-10 RX ADMIN — LORAZEPAM 0.5 MG: 2 INJECTION INTRAMUSCULAR; INTRAVENOUS at 15:17

## 2017-11-10 RX ADMIN — ASPIRIN 81 MG: 81 TABLET, COATED ORAL at 15:17

## 2017-11-10 RX ADMIN — PROPOFOL 30 MG: 10 INJECTION, EMULSION INTRAVENOUS at 13:44

## 2017-11-10 RX ADMIN — PANTOPRAZOLE SODIUM 20 MG: 20 TABLET, DELAYED RELEASE ORAL at 06:50

## 2017-11-10 RX ADMIN — KETOROLAC TROMETHAMINE 15 MG: 30 INJECTION, SOLUTION INTRAMUSCULAR at 21:27

## 2017-11-10 NOTE — ANESTHESIA POSTPROCEDURE EVALUATION
Post-Op Assessment Note      CV Status:  Stable    Mental Status:  Lethargic    Hydration Status:  Euvolemic    PONV Controlled:  Controlled    Airway Patency:  Patent    Post Op Vitals Reviewed:  Yes              /75 (11/10/17 1436)    Temp     Pulse 76 (11/10/17 1436)   Resp 20 (11/10/17 1436)    SpO2 100 % (11/10/17 1436)

## 2017-11-10 NOTE — PROGRESS NOTES
NEPHROLOGY PROGRESS NOTE   Jae Beauchamp 61 y o  male MRN: [id number]  Unit/Bed#: 2 [address] Encounter: [id number]  Reason for Consult: Hypernatremia    ASSESSMENT and PLAN:  1  Hypernatremia: resolved with free water administration  Continue D5W at 75 cc/hr  2  Developmental disability  3  Falls  SUMMARY OF RECOMMENDATIONS:  · Continue D5W at 75 cc/hr  SUBJECTIVE / INTERVAL HISTORY:  No new acute issues  Not following commands  OBJECTIVE:  Current Weight: Weight - Scale: 59 9 kg (132 lb) (transfer papers)  Vitals:    11/09/17 0700 11/09/17 1300 11/09/17 2040 11/10/17 0806   BP:  116/70 90/54 104/56   Pulse:  100 62 64   Resp:  18 18 20   Temp:  98 2 °F (36 8 °C)  (!) 97 4 °F (36 3 °C)   TempSrc:  Tympanic  Tympanic   SpO2:  97% 98% 98%   Weight:       Height: 5' (1 524 m)        No intake or output data in the 24 hours ending 11/10/17 0925    General: conscious, incoherent, not following commands  Chest/Lungs: clear breath sounds  CVS: distinct heart sounds, normal rate     Abdomen: soft  Extremities: no edema    Medications:    Current Facility-Administered Medications:     acetaminophen (TYLENOL) tablet 650 mg, 650 mg, Oral, Q6H PRN, Johnathon Balderas MD    ARIPiprazole (ABILIFY) tablet 30 mg, 30 mg, Oral, Daily, Johnathon Balderas MD, 30 mg at 11/09/17 0919    aspirin (ECOTRIN LOW STRENGTH) EC tablet 81 mg, 81 mg, Oral, Daily, Johnathon Balderas MD, 81 mg at 11/09/17 0918    atorvastatin (LIPITOR) tablet 20 mg, 20 mg, Oral, HS, Johnathon Balderas MD, 20 mg at 11/09/17 2342    dextrose 5 % infusion, 75 mL/hr, Intravenous, Continuous, Salomon Swain MD, Last Rate: 75 mL/hr at 11/10/17 0410, 75 mL/hr at 11/10/17 0410    divalproex sodium (DEPAKOTE) EC tablet 250 mg, 250 mg, Oral, HS, Johnathon Balderas MD, 250 mg at 11/09/17 2343    divalproex sodium (DEPAKOTE) EC tablet 750 mg, 750 mg, Oral, Johnathon HURD MD, 750 mg at 11/09/17 0918    divalproex sodium (DEPAKOTE) EC tablet 750 mg, 750 mg, Oral, After Lunch, Johnathon Balderas MD, 750 mg at 11/09/17 1529    escitalopram (LEXAPRO) tablet 20 mg, 20 mg, Oral, Daily, Johnathon Balderas MD, 20 mg at 11/09/17 0917    ketorolac (TORADOL) 30 mg/mL injection 15 mg, 15 mg, Intravenous, Q8H [address], Migel Alba MD, 15 mg at 11/10/17 0650    LORazepam (ATIVAN) 2 mg/mL injection 0 5 mg, 0 5 mg, Intravenous, Q6H PRN, Johnathon Balderas MD, 0 5 mg at 11/10/17 0024    pantoprazole (PROTONIX) EC tablet 20 mg, 20 mg, Oral, Early Morning, Johnathon Balderas MD, 20 mg at 11/10/17 0650    Laboratory Results:    Results from last 7 days  Lab Units 11/10/17  0645 11/09/17  0609 11/08/17  1422   WBC Thousand/uL  --  8 40 11 10*   HEMOGLOBIN g/dL  --  12 8* 14 9   HEMATOCRIT %  --  38 7* 45 3   PLATELETS Thousands/uL  --  145 208   SODIUM mmol/L 144 150* 150*   POTASSIUM mmol/L 4 3 4 0 4 4   CHLORIDE mmol/L 105 111* 109*   CO2 mmol/L 27 29 28   BUN mg/dL 30* 22 18   CREATININE mg/dL 0 44* 0 34* 0 30*   CALCIUM mg/dL 8 8 8 8 9 4   ALBUMIN g/dL  --   --  2 8*   TOTAL PROTEIN g/dL  --   --  7 8   GLUCOSE RANDOM mg/dL 88 98 104     Previous work up:

## 2017-11-10 NOTE — PLAN OF CARE
DISCHARGE PLANNING - CARE MANAGEMENT     Discharge to post-acute care or home with appropriate resources Progressing        Nutrition/Hydration-ADULT     Nutrient/Hydration intake appropriate for improving, restoring or maintaining nutritional needs Progressing        Potential for Falls     Patient will remain free of falls Progressing        Prexisting or High Potential for Compromised Skin Integrity     Skin integrity is maintained or improved Progressing        SAFETY,RESTRAINT: NV/NON-SELF DESTRUCTIVE BEHAVIOR     Remains free of harm/injury (restraint for non violent/non self-detsructive behavior) Progressing     Returns to optimal restraint-free functioning Progressing        SKIN/TISSUE INTEGRITY - ADULT     Skin integrity remains intact Progressing

## 2017-11-10 NOTE — PHYSICIAN ADVISOR
Current patient class: Observation  The patient is currently on Hospital Day: 3      The patient was admitted to the hospital at N/A on N/A for the following diagnosis:  Head injury [S09 90XA]  Frequent falls [R29 6]       There is documentation in the medical record of an expected length of stay of at least 2 midnights  The patient is therefore expected to satisfy the 2 midnight benchmark and given the 2 midnight presumption is appropriate for INPATIENT ADMISSION  Given this expectation of a satisfying stay, CMS instructs us that the patient is most often appropriate for inpatient admission under part A provided medical necessity is documented in the chart  After review of the relevant documentation, labs, vital signs and test results, the patient is appropriate for INPATIENT ADMISSION  Admission to the hospital as an inpatient is a complex decision making process which requires the practitioner to consider the patients presenting complaint, history and physical examination and all relevant testing  With this in mind, in this case, the patient was deemed appropriate for INPATIENT ADMISSION  After review of the documentation and testing available at the time of the admission I concur with this clinical determination of medical necessity  Rationale is as follows: The patient is a 61 yrs old Male who presented to the ED at 11/8/2017  1:27 PM with a chief complaint of Fall (Patient has had a few falls causing abrsions and eccymosis to head and face  Was seen at [facility] had CT scan of head X 2 (11/6/17 & 11/7/17)   Patient was sent to the ED today for further evaluation)    The patients vitals on arrival were ED Triage Vitals   Temperature Pulse Respirations Blood Pressure SpO2   11/08/17 1309 11/08/17 1309 11/08/17 1309 11/08/17 1609 11/09/17 1300   97 8 °F (36 6 °C) 104 20 113/69 97 %      Temp Source Heart Rate Source Patient Position - Orthostatic VS BP Location FiO2 (%)   11/08/17 1309 11/08/17 1309 11/08/17 1609 11/08/17 1609 --   Tympanic Apical Lying Right leg       Pain Score       11/08/17 1309       No Pain           Past Medical History:   Diagnosis Date    Deviated nasal septum     Eczema     Gastritis     Hyperlipidemia     Impulse control disorder     Insomnia     Mental retardation     Osteoarthritis     Psychiatric disorder     atypical psychosis, impulse control disorder    Ptosis of both eyelids     r > l    Scoliosis      History reviewed  No pertinent surgical history          Consults have been placed to:   IP CONSULT TO NEUROLOGY  IP CONSULT TO NEUROLOGY  IP CONSULT TO NEPHROLOGY  IP CONSULT TO ANESTHESIOLOGY    Vitals:    11/08/17 1609 11/09/17 0700 11/09/17 1300 11/09/17 2040   BP: 113/69  116/70 90/54   Pulse: (!) 108  100 62   Resp: 18  18 18   Temp: 98 °F (36 7 °C)  98 2 °F (36 8 °C)    TempSrc: Tympanic  Tympanic    SpO2:   97% 98%   Weight:       Height:  5' (1 524 m)         Most recent labs:    Recent Labs      11/08/17   1422  11/09/17   0609   WBC  11 10*  8 40   HGB  14 9  12 8*   HCT  45 3  38 7*   PLT  208  145   K  4 4  4 0   NA  150*  150*   CALCIUM  9 4  8 8   BUN  18  22   CREATININE  0 30*  0 34*   AST  88*   --    ALT  51   --    ALKPHOS  131*   --    BILITOT  0 40   --        Scheduled Meds:  ARIPiprazole 30 mg Oral Daily   aspirin 81 mg Oral Daily   atorvastatin 20 mg Oral HS   divalproex sodium 250 mg Oral HS   divalproex sodium 750 mg Oral QAM   divalproex sodium 750 mg Oral After Lunch   escitalopram 20 mg Oral Daily   ketorolac 15 mg Intravenous Q8H [address]   pantoprazole 20 mg Oral Early Morning     Continuous Infusions:  dextrose 75 mL/hr Last Rate: 75 mL/hr (11/09/17 1247)     PRN Meds:   acetaminophen    LORazepam    Surgical procedures (if appropriate):

## 2017-11-10 NOTE — ANESTHESIA PREPROCEDURE EVALUATION
Review of Systems/Medical History          Cardiovascular  Hyperlipidemia,    Pulmonary       GI/Hepatic            Endo/Other  Arthritis     GYN       Hematology   Musculoskeletal       Neurology   Psychology   Psychiatric history,            Physical Exam    Airway  Comment: Unable to assess           Dental       Cardiovascular      Pulmonary      Other Findings        Anesthesia Plan  ASA Score- 3       Anesthesia Type- IV sedation with anesthesia with ASA Monitors  Additional Monitors:   Airway Plan:           Induction- intravenous  Informed Consent- Anesthetic plan and risks discussed with healthcare power of          Deviated nasal septum      Eczema      Gastritis      Hyperlipidemia      Impulse control disorder      Insomnia      Mental retardation      Osteoarthritis      Psychiatric disorder       atypical psychosis, impulse control disorder    Ptosis of both eyelids       r > l    Scoliosis

## 2017-11-10 NOTE — PHYSICAL THERAPY NOTE
Attempted to see pt for PT, however pt is lethargic  Unable to open eyes  RN aware  1:1 sitter  Will follow

## 2017-11-11 LAB
ANION GAP SERPL CALCULATED.3IONS-SCNC: 7 MMOL/L (ref 4–13)
BUN SERPL-MCNC: 21 MG/DL (ref 5–25)
CALCIUM SERPL-MCNC: 8.2 MG/DL (ref 8.3–10.1)
CHLORIDE SERPL-SCNC: 105 MMOL/L (ref 100–108)
CO2 SERPL-SCNC: 28 MMOL/L (ref 21–32)
CREAT SERPL-MCNC: 0.25 MG/DL (ref 0.6–1.3)
GFR SERPL CREATININE-BSD FRML MDRD: 159 ML/MIN/1.73SQ M
GLUCOSE SERPL-MCNC: 92 MG/DL (ref 65–140)
POTASSIUM SERPL-SCNC: 4 MMOL/L (ref 3.5–5.3)
SODIUM SERPL-SCNC: 140 MMOL/L (ref 136–145)

## 2017-11-11 PROCEDURE — 3E0G76Z INTRODUCTION OF NUTRITIONAL SUBSTANCE INTO UPPER GI, VIA NATURAL OR ARTIFICIAL OPENING: ICD-10-PCS | Performed by: INTERNAL MEDICINE

## 2017-11-11 PROCEDURE — 92526 ORAL FUNCTION THERAPY: CPT

## 2017-11-11 PROCEDURE — 80048 BASIC METABOLIC PNL TOTAL CA: CPT | Performed by: INTERNAL MEDICINE

## 2017-11-11 RX ORDER — DEXTROSE AND SODIUM CHLORIDE 5; .45 G/100ML; G/100ML
75 INJECTION, SOLUTION INTRAVENOUS CONTINUOUS
Status: DISCONTINUED | OUTPATIENT
Start: 2017-11-11 | End: 2017-11-14

## 2017-11-11 RX ADMIN — DIVALPROEX SODIUM 750 MG: 250 TABLET, DELAYED RELEASE ORAL at 10:04

## 2017-11-11 RX ADMIN — DEXTROSE AND SODIUM CHLORIDE 75 ML/HR: 5; 450 INJECTION, SOLUTION INTRAVENOUS at 11:17

## 2017-11-11 RX ADMIN — KETOROLAC TROMETHAMINE 15 MG: 30 INJECTION, SOLUTION INTRAMUSCULAR at 15:12

## 2017-11-11 RX ADMIN — ATORVASTATIN CALCIUM 20 MG: 20 TABLET, FILM COATED ORAL at 22:00

## 2017-11-11 RX ADMIN — DEXTROSE AND SODIUM CHLORIDE 75 ML/HR: 5; 450 INJECTION, SOLUTION INTRAVENOUS at 23:24

## 2017-11-11 RX ADMIN — PANTOPRAZOLE SODIUM 20 MG: 20 TABLET, DELAYED RELEASE ORAL at 05:06

## 2017-11-11 RX ADMIN — ASPIRIN 81 MG: 81 TABLET, COATED ORAL at 10:04

## 2017-11-11 RX ADMIN — ARIPIPRAZOLE 30 MG: 10 TABLET ORAL at 10:03

## 2017-11-11 RX ADMIN — DIVALPROEX SODIUM 250 MG: 250 TABLET, DELAYED RELEASE ORAL at 22:00

## 2017-11-11 RX ADMIN — DIVALPROEX SODIUM 750 MG: 250 TABLET, DELAYED RELEASE ORAL at 15:12

## 2017-11-11 RX ADMIN — DEXTROSE 75 ML/HR: 5 SOLUTION INTRAVENOUS at 05:17

## 2017-11-11 RX ADMIN — KETOROLAC TROMETHAMINE 15 MG: 30 INJECTION, SOLUTION INTRAMUSCULAR at 05:07

## 2017-11-11 RX ADMIN — ESCITALOPRAM OXALATE 20 MG: 10 TABLET ORAL at 10:03

## 2017-11-11 RX ADMIN — LORAZEPAM 0.5 MG: 2 INJECTION INTRAMUSCULAR; INTRAVENOUS at 11:16

## 2017-11-11 NOTE — PROGRESS NOTES
Progress Note - Tamiko Gonsalez 61 y o  male MRN: [id number]    Unit/Bed#: 2 [address] Encounter: [id number]        Subjective:   Patient is sleepy he had his MRI done under anesthesia earlier  Vital signs noted  Ammonia level is 30 and Depakene level is 45          Review of Systems    Objective:     Vitals: Blood pressure 143/74, pulse 83, temperature (!) 97 4 °F (36 3 °C), temperature source Tympanic, resp  rate 18, height 5' (1 524 m), weight 59 9 kg (132 lb), SpO2 100 %  ,Body mass index is 25 78 kg/m²      No intake or output data in the 24 hours ending 11/10/17 1900      Current Facility-Administered Medications:     acetaminophen (TYLENOL) tablet 650 mg, 650 mg, Oral, Q6H PRN, Johnathon Balderas MD    ARIPiprazole (ABILIFY) tablet 30 mg, 30 mg, Oral, Daily, Johnathon Balderas MD, 30 mg at 11/10/17 1517    aspirin (ECOTRIN LOW STRENGTH) EC tablet 81 mg, 81 mg, Oral, Daily, Johnathon Balderas MD, 81 mg at 11/10/17 1517    atorvastatin (LIPITOR) tablet 20 mg, 20 mg, Oral, HS, Jonhathon Balderas MD, 20 mg at 11/09/17 2342    dextrose 5 % infusion, 75 mL/hr, Intravenous, Continuous, Sancho Olivera MD, Last Rate: 75 mL/hr at 11/10/17 1530, 75 mL/hr at 11/10/17 1530    divalproex sodium (DEPAKOTE) EC tablet 250 mg, 250 mg, Oral, HS, Johnathon Balderas MD, 250 mg at 11/09/17 2343    divalproex sodium (DEPAKOTE) EC tablet 750 mg, 750 mg, Oral, QAM, Johnathon Balderas MD, 750 mg at 11/10/17 1039    divalproex sodium (DEPAKOTE) EC tablet 750 mg, 750 mg, Oral, After Lunch, Johnathon Balderas MD, 750 mg at 11/10/17 1516    escitalopram (LEXAPRO) tablet 20 mg, 20 mg, Oral, Daily, Johnathon Balderas MD, 20 mg at 11/10/17 1517    ketorolac (TORADOL) 30 mg/mL injection 15 mg, 15 mg, Intravenous, Q8H [address], Enrrique Alvarez MD, 15 mg at 11/10/17 1516    LORazepam (ATIVAN) 2 mg/mL injection 0 5 mg, 0 5 mg, Intravenous, Q6H PRN, Johnathon Balderas MD, 0 5 mg at 11/10/17 1517    pantoprazole (PROTONIX) EC tablet 20 mg, 20 mg, Oral, Early Morning, Johnathon Balderas MD, 20 mg at 11/10/17 0650     Physical Exam   Constitutional:   Periods of agitation   HENT:   Ecchymotic areas with lump noted on the forehead  Also patient has an abrasion on the occipital area   Eyes: Conjunctivae are normal    Neck: Neck supple  Cardiovascular: Normal rate and regular rhythm  Pulmonary/Chest:   Transmitted sounds heard   Abdominal: Soft  Bowel sounds are normal  He exhibits no distension  There is no tenderness  Musculoskeletal: Normal range of motion  He exhibits no edema or deformity  Neurological: He is alert  He displays normal reflexes  No cranial nerve deficit  Skin: Skin is warm and dry     Psychiatric:   Periods of agitation           Lab, Imaging and culture:     Recent Results (from the past 72 hour(s))   CBC and differential    Collection Time: 11/08/17  2:22 PM   Result Value Ref Range    WBC 11 10 (H) 4 80 - 10 80 Thousand/uL    RBC 5 23 4 70 - 6 10 Million/uL    Hemoglobin 14 9 14 0 - 18 0 g/dL    Hematocrit 45 3 42 0 - 52 0 %    MCV 87 82 - 98 fL    MCH 28 4 27 0 - 31 0 pg    MCHC 32 8 31 4 - 37 4 g/dL    RDW 15 9 (H) 11 6 - 15 1 %    MPV 8 1 (L) 8 9 - 12 7 fL    Platelets 603 105 - 651 Thousands/uL    nRBC 0 /100 WBCs    Neutrophils Relative 85 (H) 43 - 75 %    Lymphocytes Relative 8 (L) 14 - 44 %    Monocytes Relative 7 4 - 12 %    Eosinophils Relative 0 0 - 6 %    Basophils Relative 0 0 - 1 %    Neutrophils Absolute 9 50 (H) 1 85 - 7 62 Thousands/µL    Lymphocytes Absolute 0 90 0 60 - 4 47 Thousands/µL    Monocytes Absolute 0 70 0 17 - 1 22 Thousand/µL    Eosinophils Absolute 0 00 0 00 - 0 61 Thousand/µL    Basophils Absolute 0 00 0 00 - 0 10 Thousands/µL   Comprehensive metabolic panel    Collection Time: 11/08/17  2:22 PM   Result Value Ref Range    Sodium 150 (H) 136 - 145 mmol/L    Potassium 4 4 3 5 - 5 3 mmol/L    Chloride 109 (H) 100 - 108 mmol/L    CO2 28 21 - 32 mmol/L    Anion Gap 13 4 - 13 mmol/L    BUN 18 5 - 25 mg/dL Creatinine 0 30 (L) 0 60 - 1 30 mg/dL    Glucose 104 65 - 140 mg/dL    Calcium 9 4 8 3 - 10 1 mg/dL    AST 88 (H) 5 - 45 U/L    ALT 51 12 - 78 U/L    Alkaline Phosphatase 131 (H) 46 - 116 U/L    Total Protein 7 8 6 4 - 8 2 g/dL    Albumin 2 8 (L) 3 5 - 5 0 g/dL    Total Bilirubin 0 40 0 20 - 1 00 mg/dL    eGFR 147 ml/min/1 73sq m   TSH    Collection Time: 11/08/17  2:22 PM   Result Value Ref Range    TSH 3RD GENERATON 2 225 0 358 - 3 740 uIU/mL   Smear Review(Phlebs Do Not Order)    Collection Time: 11/08/17  2:22 PM   Result Value Ref Range    Platelet Estimate Adequate Adequate   ECG 12 lead    Collection Time: 11/08/17  2:39 PM   Result Value Ref Range    Ventricular Rate 111 BPM    Atrial Rate 111 BPM    PA Interval 116 ms    QRSD Interval 86 ms    QT Interval 352 ms    QTC Interval 478 ms    P Axis 37 degrees    QRS Axis -63 degrees    T Wave Axis 81 degrees   Basic metabolic panel    Collection Time: 11/09/17  6:09 AM   Result Value Ref Range    Sodium 150 (H) 136 - 145 mmol/L    Potassium 4 0 3 5 - 5 3 mmol/L    Chloride 111 (H) 100 - 108 mmol/L    CO2 29 21 - 32 mmol/L    Anion Gap 10 4 - 13 mmol/L    BUN 22 5 - 25 mg/dL    Creatinine 0 34 (L) 0 60 - 1 30 mg/dL    Glucose 98 65 - 140 mg/dL    Calcium 8 8 8 3 - 10 1 mg/dL    eGFR 140 ml/min/1 73sq m   CBC (With Platelets)    Collection Time: 11/09/17  6:09 AM   Result Value Ref Range    WBC 8 40 4 80 - 10 80 Thousand/uL    RBC 4 50 (L) 4 70 - 6 10 Million/uL    Hemoglobin 12 8 (L) 14 0 - 18 0 g/dL    Hematocrit 38 7 (L) 42 0 - 52 0 %    MCV 86 82 - 98 fL    MCH 28 4 27 0 - 31 0 pg    MCHC 33 0 31 4 - 37 4 g/dL    RDW 14 8 11 6 - 15 1 %    Platelets 975 740 - 344 Thousands/uL    MPV 8 1 (L) 8 9 - 12 7 fL   Ammonia    Collection Time: 11/10/17  6:45 AM   Result Value Ref Range    Ammonia 30 11 - 35 umol/L   Valproic acid level, total    Collection Time: 11/10/17  6:45 AM   Result Value Ref Range    Valproic Acid, Total 45 (L) 50 - 100 ug/mL   Basic metabolic panel    Collection Time: 11/10/17  6:45 AM   Result Value Ref Range    Sodium 144 136 - 145 mmol/L    Potassium 4 3 3 5 - 5 3 mmol/L    Chloride 105 100 - 108 mmol/L    CO2 27 21 - 32 mmol/L    Anion Gap 12 4 - 13 mmol/L    BUN 30 (H) 5 - 25 mg/dL    Creatinine 0 44 (L) 0 60 - 1 30 mg/dL    Glucose 88 65 - 140 mg/dL    Calcium 8 8 8 3 - 10 1 mg/dL    eGFR 126 ml/min/1 73sq m       XR skull < 4 vw   Final Result      No radiopaque foreign body  Workstation performed: [id number]         MRI brain w wo contrast   Final Result   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      No focal brain parenchymal abnormalities      No acute intracranial hemorrhage or mass effect      No pathologic enhancement               Workstation performed: [id number]         XR chest 1 view portable   Final Result   Diminished inspiration  No active pulmonary disease  Workstation performed: [id number]             Invasive Devices     Peripheral Intravenous Line            Peripheral IV 11/08/17 Right Forearm 2 days                      Assessment:  Multiple head injuries  Frequent falls unsteady gait  Post concussion syndrome  Hypernatremia resolved  Swallowing problems    Plan:  Patient is for MRI today  Nephrology consultation for hypernatremia  Swallowing evaluation done diet changed  Mittens  Discussed with the nursing staff  Follow up with the MRI report    PT evaluation and treatment

## 2017-11-11 NOTE — SPEECH THERAPY NOTE
SLP Swallow Therapy Note    S: Pt in bed, appears to be restless, 1:1 supervision provided  O: Upon entering room pt was being fed by patient sitter- education provided on proper and safe feeding position  This is however difficult with this patient as he is restless and moves around the bed  Pt eventually repositioned in bed and HOB elevated  Pt consumed puree and HTL - very small amounts- noted to exhibit increased coughing and congestion  Stopped PO at this time  RN made aware- pt unable to clear congestion despite attempts to cough  Unable to follow commands to clear  Call MD, Dr Harleen Wade- and recommended NPO and order MBS  RN and MD aware and agreeable  A:  pt NPO and MBS to be ordered to assess swallow skills further  P: NPO, order and schedule MBS       Discussed with Patient Kalani, RN and MD

## 2017-11-11 NOTE — PROGRESS NOTES
Progress Note - Charly Perea 61 y o  male MRN: [id number]    Unit/Bed#: [address] [place] [address] A Encounter: [id number]        Subjective:   Chart reviewed patient evaluated  Discussed with the speech therapist as there is a concern for swallowing possible aspiration  Patient has been kept NPO now intravenous fluids have been increased  Patient with periods of agitation  MRI findings noted          Review of Systems    Objective:     Vitals: Blood pressure 140/67, pulse 68, temperature (!) 97 4 °F (36 3 °C), temperature source Tympanic, resp  rate 18, height 5' (1 524 m), weight 59 9 kg (132 lb), SpO2 96 %  ,Body mass index is 25 78 kg/m²        Intake/Output Summary (Last 24 hours) at 11/11/17 1732  Last data filed at 11/11/17 1000   Gross per 24 hour   Intake              360 ml   Output                0 ml   Net              360 ml         Current Facility-Administered Medications:     acetaminophen (TYLENOL) tablet 650 mg, 650 mg, Oral, Q6H PRN, Johnathon Balderas MD    ARIPiprazole (ABILIFY) tablet 30 mg, 30 mg, Oral, Daily, Johnathon Balderas MD, 30 mg at 11/11/17 1003    aspirin (ECOTRIN LOW STRENGTH) EC tablet 81 mg, 81 mg, Oral, Daily, Johnathon Balderas MD, 81 mg at 11/11/17 1004    atorvastatin (LIPITOR) tablet 20 mg, 20 mg, Oral, HS, Johnathon Balderas MD, 20 mg at 11/10/17 2124    dextrose 5 % and sodium chloride 0 45 % infusion, 75 mL/hr, Intravenous, Continuous, Johnathon Balderas MD, Last Rate: 75 mL/hr at 11/11/17 1117, 75 mL/hr at 11/11/17 1117    divalproex sodium (DEPAKOTE) EC tablet 250 mg, 250 mg, Oral, HS, Johnathon Balderas MD, 250 mg at 11/10/17 2124    divalproex sodium (DEPAKOTE) EC tablet 750 mg, 750 mg, Oral, QAM, Johnathon Balderas MD, 750 mg at 11/11/17 1004    divalproex sodium (DEPAKOTE) EC tablet 750 mg, 750 mg, Oral, After Lunch, Johnathon Balderas MD, 750 mg at 11/11/17 1512    escitalopram (LEXAPRO) tablet 20 mg, 20 mg, Oral, Daily, Johnathon Balderas MD, 20 mg at 11/11/17 1003    LORazepam (ATIVAN) 2 mg/mL injection 0 5 mg, 0 5 mg, Intravenous, Q6H PRN, Johnathon Balderas MD, 0 5 mg at 11/11/17 1116    pantoprazole (PROTONIX) EC tablet 20 mg, 20 mg, Oral, Early Morning, Johnathon Balderas MD, 20 mg at 11/11/17 0506     Physical Exam   Constitutional:   Periods of agitation   HENT:   Ecchymotic areas with lump noted on the forehead  Also patient has an abrasion on the occipital area   Eyes: Conjunctivae are normal    Neck: Neck supple  Cardiovascular: Normal rate and regular rhythm  Pulmonary/Chest:   Transmitted sounds heard  Decreased breath sounds at the bases   Abdominal: Soft  Bowel sounds are normal  He exhibits no distension  There is no tenderness  Musculoskeletal: Normal range of motion  He exhibits no edema or deformity  Neurological: He is alert  He displays normal reflexes  No cranial nerve deficit  Skin: Skin is warm and dry     Psychiatric:   Periods of agitation           Lab, Imaging and culture:     Recent Results (from the past 72 hour(s))   Basic metabolic panel    Collection Time: 11/09/17  6:09 AM   Result Value Ref Range    Sodium 150 (H) 136 - 145 mmol/L    Potassium 4 0 3 5 - 5 3 mmol/L    Chloride 111 (H) 100 - 108 mmol/L    CO2 29 21 - 32 mmol/L    Anion Gap 10 4 - 13 mmol/L    BUN 22 5 - 25 mg/dL    Creatinine 0 34 (L) 0 60 - 1 30 mg/dL    Glucose 98 65 - 140 mg/dL    Calcium 8 8 8 3 - 10 1 mg/dL    eGFR 140 ml/min/1 73sq m   CBC (With Platelets)    Collection Time: 11/09/17  6:09 AM   Result Value Ref Range    WBC 8 40 4 80 - 10 80 Thousand/uL    RBC 4 50 (L) 4 70 - 6 10 Million/uL    Hemoglobin 12 8 (L) 14 0 - 18 0 g/dL    Hematocrit 38 7 (L) 42 0 - 52 0 %    MCV 86 82 - 98 fL    MCH 28 4 27 0 - 31 0 pg    MCHC 33 0 31 4 - 37 4 g/dL    RDW 14 8 11 6 - 15 1 %    Platelets 595 224 - 924 Thousands/uL    MPV 8 1 (L) 8 9 - 12 7 fL   Ammonia    Collection Time: 11/10/17  6:45 AM   Result Value Ref Range    Ammonia 30 11 - 35 umol/L   Valproic acid level, total Collection Time: 11/10/17  6:45 AM   Result Value Ref Range    Valproic Acid, Total 45 (L) 50 - 100 ug/mL   Basic metabolic panel    Collection Time: 11/10/17  6:45 AM   Result Value Ref Range    Sodium 144 136 - 145 mmol/L    Potassium 4 3 3 5 - 5 3 mmol/L    Chloride 105 100 - 108 mmol/L    CO2 27 21 - 32 mmol/L    Anion Gap 12 4 - 13 mmol/L    BUN 30 (H) 5 - 25 mg/dL    Creatinine 0 44 (L) 0 60 - 1 30 mg/dL    Glucose 88 65 - 140 mg/dL    Calcium 8 8 8 3 - 10 1 mg/dL    eGFR 126 ml/min/1 73sq m   Basic metabolic panel    Collection Time: 11/11/17  5:40 AM   Result Value Ref Range    Sodium 140 136 - 145 mmol/L    Potassium 4 0 3 5 - 5 3 mmol/L    Chloride 105 100 - 108 mmol/L    CO2 28 21 - 32 mmol/L    Anion Gap 7 4 - 13 mmol/L    BUN 21 5 - 25 mg/dL    Creatinine 0 25 (L) 0 60 - 1 30 mg/dL    Glucose 92 65 - 140 mg/dL    Calcium 8 2 (L) 8 3 - 10 1 mg/dL    eGFR 159 ml/min/1 73sq m       XR skull < 4 vw   Final Result      No radiopaque foreign body  Workstation performed: [id number]         MRI brain w wo contrast   Final Result   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      No focal brain parenchymal abnormalities      No acute intracranial hemorrhage or mass effect      No pathologic enhancement               Workstation performed: [id number]         XR chest 1 view portable   Final Result   Diminished inspiration  No active pulmonary disease           Workstation performed: [id number]         FL barium swallow video w speech    (Results Pending)       Invasive Devices     Peripheral Intravenous Line            Peripheral IV 11/08/17 Right Forearm 3 days                      Assessment:  Multiple head injuries  Frequent falls unsteady gait  Post concussion syndrome  Hypernatremia resolved  Swallowing problems  MRI findings noted no acute findings  Plan:   IV fluids  Mittens  Discussed with the nursing staff  Patient at present time is kept NPO after speaking to the speech therapist   Discussed with the patient's brother at length regarding patient's condition and also the MRI findings  The according to the brother patient had problems with swallowing for a long period of time and has been seen by speech therapist and had modified barium swallows in the past   Message also left with the patient's guardian's office

## 2017-11-11 NOTE — PROGRESS NOTES
NEPHROLOGY PROGRESS NOTE   Pari Landaverde 61 y o  male MRN: [id number]  Unit/Bed#: 2 [address] Encounter: [id number]  Reason for Consult: Hypernatremia    ASSESSMENT and PLAN:  1  Hypernatremia: resolved  Will stop D5W IVF  Nursing instructed to have patient drink at least 500 cc of honey thick liquids with each meal to avoid recurrence of hypernatremia  2  Developmental disability  3  Falls  SUMMARY OF RECOMMENDATIONS:  · Stop IVF  · Give at least 500 cc of honey thick liquids with each meal      Discussed with RN  Nothing further to add from a renal standpoint  Will sign off  Feel free to call us back for any questions or concerns  Thank you! SUBJECTIVE / INTERVAL HISTORY:  Apparently has a ravenous appetite  OBJECTIVE:  Current Weight: Weight - Scale: 59 9 kg (132 lb) (transfer papers)  Vitals:    11/10/17 1438 11/10/17 1439 11/10/17 1442 11/10/17 2206   BP: 143/74   111/60   Pulse: 71  83 69   Resp: 20 18  18   Temp:    (!) 96 8 °F (36 °C)   TempSrc:    Tympanic   SpO2:  100%  96%   Weight:       Height:         No intake or output data in the 24 hours ending 11/11/17 0650    General: conscious, not cooperative, no distress  Chest/Lungs: clear breath sounds  CVS: distinct heart sounds, normal rate     Abdomen: soft  Extremities: no edema    Medications:    Current Facility-Administered Medications:     acetaminophen (TYLENOL) tablet 650 mg, 650 mg, Oral, Q6H PRN, Johnathon Balderas MD    ARIPiprazole (ABILIFY) tablet 30 mg, 30 mg, Oral, Daily, Johnathon Balderas MD, 30 mg at 11/10/17 1517    aspirin (ECOTRIN LOW STRENGTH) EC tablet 81 mg, 81 mg, Oral, Daily, Johnathon Balderas MD, 81 mg at 11/10/17 1517    atorvastatin (LIPITOR) tablet 20 mg, 20 mg, Oral, HS, Johnathon Balderas MD, 20 mg at 11/10/17 2124    dextrose 5 % infusion, 75 mL/hr, Intravenous, Continuous, Magdalene Santana MD, Last Rate: 75 mL/hr at 11/11/17 0517, 75 mL/hr at 11/11/17 0517    divalproex sodium (DEPAKOTE) EC tablet 250 mg, 250 mg, Oral, HS, Johnathon Balderas MD, 250 mg at 11/10/17 2124    divalproex sodium (DEPAKOTE) EC tablet 750 mg, 750 mg, Oral, QAM, Johnathon Balderas MD, 750 mg at 11/10/17 1039    divalproex sodium (DEPAKOTE) EC tablet 750 mg, 750 mg, Oral, After Lunch, Johnathon Balderas MD, 750 mg at 11/10/17 1516    escitalopram (LEXAPRO) tablet 20 mg, 20 mg, Oral, Daily, Johnathon Balderas MD, 20 mg at 11/10/17 1517    ketorolac (TORADOL) 30 mg/mL injection 15 mg, 15 mg, Intravenous, Q8H [address], Maldonado Holman MD, 15 mg at 11/11/17 0507    LORazepam (ATIVAN) 2 mg/mL injection 0 5 mg, 0 5 mg, Intravenous, Q6H PRN, Johnathon Balderas MD, 0 5 mg at 11/10/17 1517    pantoprazole (PROTONIX) EC tablet 20 mg, 20 mg, Oral, Early Morning, Johnathon Balderas MD, 20 mg at 11/11/17 0506    Laboratory Results:    Results from last 7 days  Lab Units 11/11/17  0540 11/10/17  0645 11/09/17  0609 11/08/17  1422   WBC Thousand/uL  --   --  8 40 11 10*   HEMOGLOBIN g/dL  --   --  12 8* 14 9   HEMATOCRIT %  --   --  38 7* 45 3   PLATELETS Thousands/uL  --   --  145 208   SODIUM mmol/L 140 144 150* 150*   POTASSIUM mmol/L 4 0 4 3 4 0 4 4   CHLORIDE mmol/L 105 105 111* 109*   CO2 mmol/L 28 27 29 28   BUN mg/dL 21 30* 22 18   CREATININE mg/dL 0 25* 0 44* 0 34* 0 30*   CALCIUM mg/dL 8 2* 8 8 8 8 9 4   ALBUMIN g/dL  --   --   --  2 8*   TOTAL PROTEIN g/dL  --   --   --  7 8   GLUCOSE RANDOM mg/dL 92 88 98 104     Previous work up:

## 2017-11-12 ENCOUNTER — GENERIC CONVERSION - ENCOUNTER (OUTPATIENT)
Dept: OTHER | Facility: OTHER | Age: 57
End: 2017-11-12

## 2017-11-12 ENCOUNTER — APPOINTMENT (INPATIENT)
Dept: RADIOLOGY | Facility: HOSPITAL | Age: 57
DRG: 913 | End: 2017-11-12
Payer: MEDICARE

## 2017-11-12 LAB
AMMONIA PLAS-SCNC: 36 UMOL/L (ref 11–35)
ANION GAP SERPL CALCULATED.3IONS-SCNC: 7 MMOL/L (ref 4–13)
ANION GAP SERPL CALCULATED.3IONS-SCNC: 9 MMOL/L (ref 4–13)
ARTERIAL PATENCY WRIST A: YES
BACTERIA UR QL AUTO: ABNORMAL /HPF
BASE EXCESS BLDA CALC-SCNC: -0.3 MMOL/L
BASOPHILS # BLD AUTO: 0 THOUSANDS/ΜL (ref 0–0.1)
BASOPHILS NFR BLD AUTO: 0 % (ref 0–1)
BILIRUB UR QL STRIP: ABNORMAL
BODY TEMPERATURE: 99.2 DEGREES FEHRENHEIT
BUN SERPL-MCNC: 13 MG/DL (ref 5–25)
BUN SERPL-MCNC: 14 MG/DL (ref 5–25)
CALCIUM SERPL-MCNC: 8.1 MG/DL (ref 8.3–10.1)
CALCIUM SERPL-MCNC: 8.3 MG/DL (ref 8.3–10.1)
CHLORIDE SERPL-SCNC: 106 MMOL/L (ref 100–108)
CHLORIDE SERPL-SCNC: 107 MMOL/L (ref 100–108)
CLARITY UR: ABNORMAL
CO2 SERPL-SCNC: 28 MMOL/L (ref 21–32)
CO2 SERPL-SCNC: 28 MMOL/L (ref 21–32)
COLOR UR: YELLOW
CREAT SERPL-MCNC: 0.23 MG/DL (ref 0.6–1.3)
CREAT SERPL-MCNC: 0.3 MG/DL (ref 0.6–1.3)
EOSINOPHIL # BLD AUTO: 0.1 THOUSAND/ΜL (ref 0–0.61)
EOSINOPHIL NFR BLD AUTO: 2 % (ref 0–6)
ERYTHROCYTE [DISTWIDTH] IN BLOOD BY AUTOMATED COUNT: 15.3 % (ref 11.6–15.1)
GFR SERPL CREATININE-BSD FRML MDRD: 147 ML/MIN/1.73SQ M
GFR SERPL CREATININE-BSD FRML MDRD: 164 ML/MIN/1.73SQ M
GLUCOSE SERPL-MCNC: 87 MG/DL (ref 65–140)
GLUCOSE SERPL-MCNC: 91 MG/DL (ref 65–140)
GLUCOSE UR STRIP-MCNC: NEGATIVE MG/DL
HCO3 BLDA-SCNC: 23.1 MMOL/L (ref 22–28)
HCT VFR BLD AUTO: 37.6 % (ref 42–52)
HGB BLD-MCNC: 12.4 G/DL (ref 14–18)
HGB UR QL STRIP.AUTO: ABNORMAL
HOROWITZ INDEX BLDA+IHG-RTO: 80 MM[HG]
KETONES UR STRIP-MCNC: ABNORMAL MG/DL
LACTATE SERPL-SCNC: 1.4 MMOL/L (ref 0.5–2)
LEUKOCYTE ESTERASE UR QL STRIP: ABNORMAL
LYMPHOCYTES # BLD AUTO: 0.5 THOUSANDS/ΜL (ref 0.6–4.47)
LYMPHOCYTES NFR BLD AUTO: 8 % (ref 14–44)
MAGNESIUM SERPL-MCNC: 2 MG/DL (ref 1.6–2.6)
MCH RBC QN AUTO: 28.3 PG (ref 27–31)
MCHC RBC AUTO-ENTMCNC: 32.9 G/DL (ref 31.4–37.4)
MCV RBC AUTO: 86 FL (ref 82–98)
MONOCYTES # BLD AUTO: 0.5 THOUSAND/ΜL (ref 0.17–1.22)
MONOCYTES NFR BLD AUTO: 8 % (ref 4–12)
NEUTROPHILS # BLD AUTO: 4.9 THOUSANDS/ΜL (ref 1.85–7.62)
NEUTS SEG NFR BLD AUTO: 82 % (ref 43–75)
NITRITE UR QL STRIP: NEGATIVE
NON-SQ EPI CELLS URNS QL MICRO: ABNORMAL /HPF
NRBC BLD AUTO-RTO: 0 /100 WBCS
O2 CT BLDA-SCNC: 16.9 ML/DL (ref 16–23)
OXYHGB MFR BLDA: 98.6 % (ref 94–97)
PCO2 BLDA: 33.9 MM HG (ref 36–44)
PCO2 TEMP ADJ BLDA: 34.3 MM HG (ref 36–44)
PEEP RESPIRATORY: 5 CM[H2O]
PH BLD: 7.45 [PH] (ref 7.35–7.45)
PH BLDA: 7.45 [PH] (ref 7.35–7.45)
PH UR STRIP.AUTO: 5.5 [PH] (ref 5–9)
PHOSPHATE SERPL-MCNC: 3.6 MG/DL (ref 2.7–4.5)
PLATELET # BLD AUTO: 150 THOUSANDS/UL (ref 130–400)
PMV BLD AUTO: 7.4 FL (ref 8.9–12.7)
PO2 BLD: 277.5 MM HG (ref 75–129)
PO2 BLDA: 276.1 MM HG (ref 75–129)
POTASSIUM SERPL-SCNC: 4.1 MMOL/L (ref 3.5–5.3)
POTASSIUM SERPL-SCNC: 4.2 MMOL/L (ref 3.5–5.3)
PROT UR STRIP-MCNC: ABNORMAL MG/DL
RBC # BLD AUTO: 4.37 MILLION/UL (ref 4.7–6.1)
RBC #/AREA URNS AUTO: ABNORMAL /HPF
SODIUM SERPL-SCNC: 141 MMOL/L (ref 136–145)
SODIUM SERPL-SCNC: 144 MMOL/L (ref 136–145)
SP GR UR STRIP.AUTO: >=1.03 (ref 1–1.03)
SPECIMEN SOURCE: ABNORMAL
UROBILINOGEN UR QL STRIP.AUTO: 1 E.U./DL
VALPROATE SERPL-MCNC: 60 UG/ML (ref 50–100)
VENT AC: 14
VENT- AC: AC
VT SETTING VENT: 450 ML
WBC # BLD AUTO: 5.9 THOUSAND/UL (ref 4.8–10.8)
WBC #/AREA URNS AUTO: ABNORMAL /HPF

## 2017-11-12 PROCEDURE — 87086 URINE CULTURE/COLONY COUNT: CPT | Performed by: NURSE PRACTITIONER

## 2017-11-12 PROCEDURE — 71010 HB CHEST X-RAY 1 VIEW FRONTAL (PORTABLE): CPT

## 2017-11-12 PROCEDURE — 85025 COMPLETE CBC W/AUTO DIFF WBC: CPT | Performed by: PHYSICIAN ASSISTANT

## 2017-11-12 PROCEDURE — 80048 BASIC METABOLIC PNL TOTAL CA: CPT | Performed by: INTERNAL MEDICINE

## 2017-11-12 PROCEDURE — 36600 WITHDRAWAL OF ARTERIAL BLOOD: CPT

## 2017-11-12 PROCEDURE — 82140 ASSAY OF AMMONIA: CPT | Performed by: PHYSICIAN ASSISTANT

## 2017-11-12 PROCEDURE — 80048 BASIC METABOLIC PNL TOTAL CA: CPT | Performed by: PHYSICIAN ASSISTANT

## 2017-11-12 PROCEDURE — 87070 CULTURE OTHR SPECIMN AEROBIC: CPT | Performed by: FAMILY MEDICINE

## 2017-11-12 PROCEDURE — 87205 SMEAR GRAM STAIN: CPT | Performed by: FAMILY MEDICINE

## 2017-11-12 PROCEDURE — 81001 URINALYSIS AUTO W/SCOPE: CPT | Performed by: EMERGENCY MEDICINE

## 2017-11-12 PROCEDURE — 94002 VENT MGMT INPAT INIT DAY: CPT

## 2017-11-12 PROCEDURE — 5A1935Z RESPIRATORY VENTILATION, LESS THAN 24 CONSECUTIVE HOURS: ICD-10-PCS | Performed by: INTERNAL MEDICINE

## 2017-11-12 PROCEDURE — 94760 N-INVAS EAR/PLS OXIMETRY 1: CPT

## 2017-11-12 PROCEDURE — 80164 ASSAY DIPROPYLACETIC ACD TOT: CPT | Performed by: PHYSICIAN ASSISTANT

## 2017-11-12 PROCEDURE — 87077 CULTURE AEROBIC IDENTIFY: CPT | Performed by: NURSE PRACTITIONER

## 2017-11-12 PROCEDURE — 0BH17EZ INSERTION OF ENDOTRACHEAL AIRWAY INTO TRACHEA, VIA NATURAL OR ARTIFICIAL OPENING: ICD-10-PCS | Performed by: INTERNAL MEDICINE

## 2017-11-12 PROCEDURE — 94003 VENT MGMT INPAT SUBQ DAY: CPT

## 2017-11-12 PROCEDURE — 87185 SC STD ENZYME DETCJ PER NZM: CPT | Performed by: FAMILY MEDICINE

## 2017-11-12 PROCEDURE — 87081 CULTURE SCREEN ONLY: CPT | Performed by: PHYSICIAN ASSISTANT

## 2017-11-12 PROCEDURE — 83735 ASSAY OF MAGNESIUM: CPT | Performed by: PHYSICIAN ASSISTANT

## 2017-11-12 PROCEDURE — 82805 BLOOD GASES W/O2 SATURATION: CPT | Performed by: PHYSICIAN ASSISTANT

## 2017-11-12 PROCEDURE — 87186 SC STD MICRODIL/AGAR DIL: CPT | Performed by: NURSE PRACTITIONER

## 2017-11-12 PROCEDURE — 83605 ASSAY OF LACTIC ACID: CPT | Performed by: PHYSICIAN ASSISTANT

## 2017-11-12 PROCEDURE — 84100 ASSAY OF PHOSPHORUS: CPT | Performed by: PHYSICIAN ASSISTANT

## 2017-11-12 PROCEDURE — 87147 CULTURE TYPE IMMUNOLOGIC: CPT | Performed by: NURSE PRACTITIONER

## 2017-11-12 RX ORDER — VALPROIC ACID 250 MG/5ML
250 SOLUTION ORAL
Status: DISCONTINUED | OUTPATIENT
Start: 2017-11-13 | End: 2017-11-13

## 2017-11-12 RX ORDER — PROPOFOL 10 MG/ML
INJECTION, EMULSION INTRAVENOUS
Status: COMPLETED
Start: 2017-11-12 | End: 2017-11-12

## 2017-11-12 RX ORDER — ASPIRIN 81 MG/1
81 TABLET, CHEWABLE ORAL DAILY
Status: DISCONTINUED | OUTPATIENT
Start: 2017-11-13 | End: 2017-11-28 | Stop reason: HOSPADM

## 2017-11-12 RX ORDER — PROPOFOL 10 MG/ML
5-50 INJECTION, EMULSION INTRAVENOUS
Status: DISCONTINUED | OUTPATIENT
Start: 2017-11-12 | End: 2017-11-13

## 2017-11-12 RX ORDER — DIVALPROEX SODIUM 125 MG/1
250 CAPSULE, COATED PELLETS ORAL
Status: DISCONTINUED | OUTPATIENT
Start: 2017-11-12 | End: 2017-11-12

## 2017-11-12 RX ORDER — SUCCINYLCHOLINE/SOD CL,ISO/PF 100 MG/5ML
100 SYRINGE (ML) INTRAVENOUS ONCE
Status: DISCONTINUED | OUTPATIENT
Start: 2017-11-12 | End: 2017-11-14

## 2017-11-12 RX ORDER — AMITRIPTYLINE HYDROCHLORIDE 10 MG/1
25 TABLET, FILM COATED ORAL
Status: DISCONTINUED | OUTPATIENT
Start: 2017-11-12 | End: 2017-11-28 | Stop reason: HOSPADM

## 2017-11-12 RX ORDER — ETOMIDATE 2 MG/ML
12 INJECTION INTRAVENOUS ONCE
Status: DISCONTINUED | OUTPATIENT
Start: 2017-11-12 | End: 2017-11-14

## 2017-11-12 RX ORDER — VALPROIC ACID 250 MG/5ML
750 SOLUTION ORAL EVERY MORNING
Status: DISCONTINUED | OUTPATIENT
Start: 2017-11-13 | End: 2017-11-15

## 2017-11-12 RX ORDER — DIVALPROEX SODIUM 125 MG/1
750 CAPSULE, COATED PELLETS ORAL EVERY MORNING
Status: DISCONTINUED | OUTPATIENT
Start: 2017-11-13 | End: 2017-11-12

## 2017-11-12 RX ORDER — CHLORHEXIDINE GLUCONATE 0.12 MG/ML
15 RINSE ORAL EVERY 12 HOURS SCHEDULED
Status: DISCONTINUED | OUTPATIENT
Start: 2017-11-12 | End: 2017-11-28 | Stop reason: HOSPADM

## 2017-11-12 RX ORDER — VALPROIC ACID 250 MG/5ML
250 SOLUTION ORAL
Status: DISCONTINUED | OUTPATIENT
Start: 2017-11-12 | End: 2017-11-15

## 2017-11-12 RX ORDER — CEFEPIME HYDROCHLORIDE 2 G/1
2000 INJECTION, POWDER, FOR SOLUTION INTRAVENOUS EVERY 12 HOURS
Status: DISCONTINUED | OUTPATIENT
Start: 2017-11-12 | End: 2017-11-13 | Stop reason: CLARIF

## 2017-11-12 RX ORDER — DIVALPROEX SODIUM 125 MG/1
750 CAPSULE, COATED PELLETS ORAL
Status: DISCONTINUED | OUTPATIENT
Start: 2017-11-13 | End: 2017-11-12

## 2017-11-12 RX ADMIN — DEXTROSE AND SODIUM CHLORIDE 75 ML/HR: 5; 450 INJECTION, SOLUTION INTRAVENOUS at 11:59

## 2017-11-12 RX ADMIN — DIVALPROEX SODIUM 750 MG: 250 TABLET, DELAYED RELEASE ORAL at 15:30

## 2017-11-12 RX ADMIN — DEXTROSE AND SODIUM CHLORIDE 75 ML/HR: 5; 450 INJECTION, SOLUTION INTRAVENOUS at 20:25

## 2017-11-12 RX ADMIN — AMITRIPTYLINE HYDROCHLORIDE 25 MG: 10 TABLET, FILM COATED ORAL at 21:36

## 2017-11-12 RX ADMIN — DIVALPROEX SODIUM 750 MG: 250 TABLET, DELAYED RELEASE ORAL at 09:22

## 2017-11-12 RX ADMIN — PROPOFOL 10 MCG/KG/MIN: 10 INJECTION, EMULSION INTRAVENOUS at 18:45

## 2017-11-12 RX ADMIN — PROPOFOL 30 MCG/KG/MIN: 10 INJECTION, EMULSION INTRAVENOUS at 23:55

## 2017-11-12 RX ADMIN — ATORVASTATIN CALCIUM 20 MG: 20 TABLET, FILM COATED ORAL at 21:36

## 2017-11-12 RX ADMIN — CEFEPIME HYDROCHLORIDE 2000 MG: 2 INJECTION, POWDER, FOR SOLUTION INTRAVENOUS at 22:59

## 2017-11-12 RX ADMIN — VALPROIC ACID 250 MG: 250 SOLUTION ORAL at 23:55

## 2017-11-12 RX ADMIN — ASPIRIN 81 MG: 81 TABLET, COATED ORAL at 09:23

## 2017-11-12 RX ADMIN — ARIPIPRAZOLE 30 MG: 10 TABLET ORAL at 11:51

## 2017-11-12 RX ADMIN — ESCITALOPRAM OXALATE 20 MG: 10 TABLET ORAL at 09:23

## 2017-11-12 RX ADMIN — CHLORHEXIDINE GLUCONATE 15 ML: 1.2 RINSE ORAL at 21:36

## 2017-11-12 RX ADMIN — PANTOPRAZOLE SODIUM 20 MG: 20 TABLET, DELAYED RELEASE ORAL at 05:56

## 2017-11-12 NOTE — PLAN OF CARE

## 2017-11-12 NOTE — CONSULTS
Neurology Consult Follow Up      Charly Perea is a 61 y o  male  [address] [facility] [address]    [id number]        Assessment/Recommendations:      1  Recent multiple fall related head injuries  2  Post concussion syndrome  3  Unsteady gait  4  H/o psychiatric disorder        Patient is not at his baseline  His MRI brain is negative for any acute process  He doesn't have clinical concern for seizures  His depakote, ammonia levels are within accepted range  Discussed with primary team that after hitting head, patients suffer from various post concussion syndrome related symptoms such as headache, dizziness, balance problems, insomnia or drowsiness and after directed physical therapy, rest these symptoms slowly resolve over 2-3 months  Course will be difficult for him as he can't follow commands or express himself to us  I'm starting him on elavil as it is known to help with post concussion related symptoms and headaches  Recommend PT for balance if possible at [facility]  Use meclizine prn  Supportive care per primary team            Chief Complaint:  ams  Subjective:   Patient is more at ease, does track for his name  He does have some cough and dyspnea  Objective:  /71   Pulse 88   Temp 97 5 °F (36 4 °C) (Tympanic)   Resp 18   Ht 5' (1 524 m)   Wt 59 9 kg (132 lb) Comment: transfer papers  SpO2 94%   BMI 25 78 kg/m²     General: alert   Mental status: The patient can track to his name but doesn't understand commands  At baseline can't communicate  Cranial nerves:  CN II-XII grossly normal   Motor: There is no pronator drift of out-stretched arms    Muscle bulk and tone are normal    Muscle exam:  Patient is moving all 4 equally  Reflexes:  Grossly normal   Sensory:  Grossly normal   Coordination:  janna  Gait/Stance:  Janna         Labs:      Lab Results   Component Value Date    WBC 8 40 11/09/2017    HGB 12 8 (L) 11/09/2017    HCT 38 7 (L) 11/09/2017    MCV 86 11/09/2017     11/09/2017 No results found for: HGBA1C  Lab Results   Component Value Date    ALT 51 11/08/2017    AST 88 (H) 11/08/2017    ALKPHOS 131 (H) 11/08/2017    BILITOT 0 40 11/08/2017     Lab Results   Component Value Date    GLUCOSE 87 11/12/2017    CALCIUM 8 1 (L) 11/12/2017     11/12/2017    K 4 1 11/12/2017    CO2 28 11/12/2017     11/12/2017    BUN 14 11/12/2017    CREATININE 0 23 (L) 11/12/2017         Review of reports and notes reveal:       Xr Skull < 4 Vw    Result Date: 11/10/2017  No radiopaque foreign body  Workstation performed: [id number]     Xr Chest 1 View Portable    Result Date: 11/9/2017  Diminished inspiration  No active pulmonary disease  Workstation performed: [id number]     **Mri Brain W Wo Contrast    Result Date: 11/10/2017  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

## 2017-11-12 NOTE — RESPIRATORY THERAPY NOTE
Patient was a rapid on 2s and intubated and brought down to me in icu by Elvin Courser  Patient placed on vent  Setting documented  Patient is very combative  Sputum sample taken  ambu bag at bedside

## 2017-11-13 ENCOUNTER — APPOINTMENT (INPATIENT)
Dept: NON INVASIVE DIAGNOSTICS | Facility: HOSPITAL | Age: 57
DRG: 913 | End: 2017-11-13
Payer: MEDICARE

## 2017-11-13 ENCOUNTER — APPOINTMENT (INPATIENT)
Dept: RADIOLOGY | Facility: HOSPITAL | Age: 57
DRG: 913 | End: 2017-11-13
Payer: MEDICARE

## 2017-11-13 LAB
ANION GAP SERPL CALCULATED.3IONS-SCNC: 9 MMOL/L (ref 4–13)
BUN SERPL-MCNC: 12 MG/DL (ref 5–25)
CALCIUM SERPL-MCNC: 8.2 MG/DL (ref 8.3–10.1)
CHLORIDE SERPL-SCNC: 108 MMOL/L (ref 100–108)
CO2 SERPL-SCNC: 26 MMOL/L (ref 21–32)
CREAT SERPL-MCNC: 0.2 MG/DL (ref 0.6–1.3)
EOSINOPHIL # BLD AUTO: 0.09 THOUSAND/UL (ref 0–0.61)
EOSINOPHIL NFR BLD MANUAL: 1 % (ref 0–6)
ERYTHROCYTE [DISTWIDTH] IN BLOOD BY AUTOMATED COUNT: 15 % (ref 11.6–15.1)
GFR SERPL CREATININE-BSD FRML MDRD: 174 ML/MIN/1.73SQ M
GLUCOSE SERPL-MCNC: 102 MG/DL (ref 65–140)
HCT VFR BLD AUTO: 35.1 % (ref 42–52)
HGB BLD-MCNC: 11.5 G/DL (ref 14–18)
LYMPHOCYTES # BLD AUTO: 0.35 THOUSAND/UL (ref 0.6–4.47)
LYMPHOCYTES # BLD AUTO: 4 %
MAGNESIUM SERPL-MCNC: 1.9 MG/DL (ref 1.6–2.6)
MCH RBC QN AUTO: 28.2 PG (ref 27–31)
MCHC RBC AUTO-ENTMCNC: 32.7 G/DL (ref 31.4–37.4)
MCV RBC AUTO: 86 FL (ref 82–98)
METAMYELOCYTES NFR BLD MANUAL: 1 % (ref 0–1)
MONOCYTES # BLD AUTO: 0.78 THOUSAND/UL (ref 0–1.22)
MONOCYTES NFR BLD AUTO: 9 % (ref 4–12)
NEUTS BAND NFR BLD MANUAL: 34 % (ref 0–8)
NEUTS SEG # BLD: 7.4 THOUSAND/UL (ref 1.81–6.82)
NEUTS SEG NFR BLD AUTO: 51 %
NRBC BLD AUTO-RTO: 0 /100 WBCS
PHOSPHATE SERPL-MCNC: 3.2 MG/DL (ref 2.7–4.5)
PLATELET # BLD AUTO: 143 THOUSANDS/UL (ref 130–400)
PLATELET BLD QL SMEAR: ADEQUATE
PMV BLD AUTO: 7.7 FL (ref 8.9–12.7)
POTASSIUM SERPL-SCNC: 3.8 MMOL/L (ref 3.5–5.3)
RBC # BLD AUTO: 4.08 MILLION/UL (ref 4.7–6.1)
SODIUM SERPL-SCNC: 143 MMOL/L (ref 136–145)
TOTAL CELLS COUNTED SPEC: 100
WBC # BLD AUTO: 8.7 THOUSAND/UL (ref 4.8–10.8)

## 2017-11-13 PROCEDURE — 87147 CULTURE TYPE IMMUNOLOGIC: CPT | Performed by: NURSE PRACTITIONER

## 2017-11-13 PROCEDURE — 84100 ASSAY OF PHOSPHORUS: CPT | Performed by: FAMILY MEDICINE

## 2017-11-13 PROCEDURE — 80048 BASIC METABOLIC PNL TOTAL CA: CPT | Performed by: PHYSICIAN ASSISTANT

## 2017-11-13 PROCEDURE — 85027 COMPLETE CBC AUTOMATED: CPT | Performed by: PHYSICIAN ASSISTANT

## 2017-11-13 PROCEDURE — 87040 BLOOD CULTURE FOR BACTERIA: CPT | Performed by: PHYSICIAN ASSISTANT

## 2017-11-13 PROCEDURE — 87077 CULTURE AEROBIC IDENTIFY: CPT | Performed by: NURSE PRACTITIONER

## 2017-11-13 PROCEDURE — 71010 HB CHEST X-RAY 1 VIEW FRONTAL (PORTABLE): CPT

## 2017-11-13 PROCEDURE — 94760 N-INVAS EAR/PLS OXIMETRY 1: CPT

## 2017-11-13 PROCEDURE — 85007 BL SMEAR W/DIFF WBC COUNT: CPT | Performed by: PHYSICIAN ASSISTANT

## 2017-11-13 PROCEDURE — 87086 URINE CULTURE/COLONY COUNT: CPT | Performed by: NURSE PRACTITIONER

## 2017-11-13 PROCEDURE — 93306 TTE W/DOPPLER COMPLETE: CPT

## 2017-11-13 PROCEDURE — 83735 ASSAY OF MAGNESIUM: CPT | Performed by: FAMILY MEDICINE

## 2017-11-13 PROCEDURE — 94003 VENT MGMT INPAT SUBQ DAY: CPT

## 2017-11-13 PROCEDURE — 87186 SC STD MICRODIL/AGAR DIL: CPT | Performed by: NURSE PRACTITIONER

## 2017-11-13 RX ORDER — LORAZEPAM 2 MG/ML
1 INJECTION INTRAMUSCULAR ONCE
Status: COMPLETED | OUTPATIENT
Start: 2017-11-13 | End: 2017-11-13

## 2017-11-13 RX ORDER — MAGNESIUM SULFATE HEPTAHYDRATE 40 MG/ML
2 INJECTION, SOLUTION INTRAVENOUS ONCE
Status: COMPLETED | OUTPATIENT
Start: 2017-11-13 | End: 2017-11-13

## 2017-11-13 RX ORDER — PHENYTOIN SODIUM 50 MG/ML
100 INJECTION, SOLUTION INTRAMUSCULAR; INTRAVENOUS ONCE
Status: COMPLETED | OUTPATIENT
Start: 2017-11-13 | End: 2017-11-13

## 2017-11-13 RX ORDER — LORAZEPAM 2 MG/ML
INJECTION INTRAMUSCULAR
Status: COMPLETED
Start: 2017-11-13 | End: 2017-11-13

## 2017-11-13 RX ORDER — VALPROIC ACID 250 MG/5ML
750 SOLUTION ORAL
Status: DISCONTINUED | OUTPATIENT
Start: 2017-11-14 | End: 2017-11-15

## 2017-11-13 RX ORDER — AMOXICILLIN 250 MG
1 CAPSULE ORAL
Status: DISCONTINUED | OUTPATIENT
Start: 2017-11-13 | End: 2017-11-28 | Stop reason: HOSPADM

## 2017-11-13 RX ADMIN — CHLORHEXIDINE GLUCONATE 15 ML: 1.2 RINSE ORAL at 08:15

## 2017-11-13 RX ADMIN — ASPIRIN 81 MG 81 MG: 81 TABLET ORAL at 09:03

## 2017-11-13 RX ADMIN — DEXMEDETOMIDINE HYDROCHLORIDE 0.4 MCG/KG/HR: 100 INJECTION, SOLUTION INTRAVENOUS at 11:30

## 2017-11-13 RX ADMIN — CEFEPIME HYDROCHLORIDE 2000 MG: 2 INJECTION, POWDER, FOR SOLUTION INTRAVENOUS at 10:00

## 2017-11-13 RX ADMIN — LORAZEPAM 1 MG: 2 INJECTION INTRAMUSCULAR; INTRAVENOUS at 13:21

## 2017-11-13 RX ADMIN — Medication 20 MG: at 09:00

## 2017-11-13 RX ADMIN — PHENYTOIN SODIUM 100 MG: 50 INJECTION INTRAMUSCULAR; INTRAVENOUS at 22:29

## 2017-11-13 RX ADMIN — VALPROIC ACID 750 MG: 250 SOLUTION ORAL at 09:04

## 2017-11-13 RX ADMIN — VALPROIC ACID 250 MG: 250 SOLUTION ORAL at 11:36

## 2017-11-13 RX ADMIN — CEFEPIME HYDROCHLORIDE 1000 MG: 1 INJECTION, POWDER, FOR SOLUTION INTRAMUSCULAR; INTRAVENOUS at 22:02

## 2017-11-13 RX ADMIN — MAGNESIUM SULFATE HEPTAHYDRATE 2 G: 40 INJECTION, SOLUTION INTRAVENOUS at 09:30

## 2017-11-13 RX ADMIN — ARIPIPRAZOLE 30 MG: 10 TABLET ORAL at 09:00

## 2017-11-13 RX ADMIN — ESCITALOPRAM OXALATE 20 MG: 10 TABLET ORAL at 08:15

## 2017-11-13 RX ADMIN — LORAZEPAM 1 MG: 2 INJECTION INTRAMUSCULAR at 13:21

## 2017-11-13 RX ADMIN — CHLORHEXIDINE GLUCONATE 15 ML: 1.2 RINSE ORAL at 21:59

## 2017-11-13 RX ADMIN — ENOXAPARIN SODIUM 40 MG: 40 INJECTION SUBCUTANEOUS at 11:24

## 2017-11-13 RX ADMIN — DEXTROSE AND SODIUM CHLORIDE 75 ML/HR: 5; 450 INJECTION, SOLUTION INTRAVENOUS at 07:47

## 2017-11-13 RX ADMIN — DEXTROSE AND SODIUM CHLORIDE 75 ML/HR: 5; 450 INJECTION, SOLUTION INTRAVENOUS at 22:02

## 2017-11-13 RX ADMIN — PROPOFOL 30 MCG/KG/MIN: 10 INJECTION, EMULSION INTRAVENOUS at 09:25

## 2017-11-13 NOTE — PHYSICAL THERAPY NOTE
PT deferred today at nursing request, in ICU following rapid response  Will re-attempt at later time as medically appropriate

## 2017-11-13 NOTE — SPEECH THERAPY NOTE
SLP  No Treatment Note    9:50 AM  Order received for MBS study  Patient s/p Rapid Response last PM, is currently intubated/ vented  Will resume ST for dysphagia assessment once patient is extubated/ medically appropriate      D/w RN and MD

## 2017-11-13 NOTE — PROGRESS NOTES
Daily Progress Note - Critical Care/ Gianna Vicky 61 y o  male MRN: [id number]  Unit/Bed#: ICU 01 Encounter: [id number]    ______________________________________________________________________  Assessment:   Principal Problem:    Head injury  Active Problems:    Mental retardation    Frequent falls  Resolved Problems:    * No resolved hospital problems  *     57-y/o male, from [address], PMHx profound mental retardation, chronic dysphagia, admitted for frequent falls with head injury, h/o hypernatremia  ICU Rapid response called on general medical floor for acute respiratory distress due to hypoxic respiratory failure  Patient desatted into the 76s and required endotracheal intubation  Plan:    Neuro:   · Frequent falls with head injury: Patient previously able to ambulate  Neurology following  MRI on 11/10 no acute changes, intracranial hemorrhage or mass effect  Avoid long-acting as sedatives  · Chronic dysphagia: speech and swallow evaluation  Barium swallow study ordered   · Insomnia: patient currently on amitriptyline 25 mg q h s  · Depression/mental delay with behaviors:  Lexapro 20 mg daily, Abilify 30 mg daily, patient on Depakote p o  750 mg q a m , afternoon , and 250 mg q h s  patient currently on valproic acid oral solution 750 mg q a m , 250 mg at lunch, 250 mg q h s    · Depakote level 60  · Sedation plan: Propofol  · RASS goal: 0 Alert and Calm  · Sedation break plan:  DS  · Pain controlled with:  Tylenol 650 mg q 6 hours p r n  for mild pain  · Pain score: none  · Regulate sleep/wake cycle    CV:   · Systolic Murmur: order echocardiogram   · patient is on aspirin 81 mg daily  · HLD: Lipitor 20mg daily   · Rhythm: NSR  · Follow rhythm on telemetry    Pulm:   · Hypoxic respiratory failure:  Endotracheal intubation on CC with rate of 12, , peep 5, FiO2 40%  · SBT plan: yes   · Chest PT ordered: yes   · Chlorhexidine ordered: yes   · HOB >30 degrees: yes GI:   · Nutrition/diet plan: NPO for barium swallow evaluation  · Protein calorie malnutrition: If remains intubated will start tube feeds and consult dietary  · Stress ulcer prophylaxis: Omeprazole 20 mg oral suspension  · Bowel regimen:  Sennakot S   · Last BM:  None on record    FEN:   · Hypernatremia:  Resolved  · IV fluid:  D5 half-NS at 75 cc/hour  · Fluid/Diuretic plan: No intervention  · Electrolytes repleted: yes Mg sulfate IVPB 2 g  · Goal: K >4 0, Mag >2 0, and Phos >3 0    :   · Possible UTI   · BUN and creatinine 12 and 0 2 this a m  within acceptable limits  Continue to monitor renal function  · Indwelling Lundberg present: yes   · Urinary catheter still needed for Strict I and O in a critically ill patient  ID:   · UTI: UA showed Moderate leuks and large blood  UCx pending  · Risk for Aspiration Pneumonia: CXR showed no acute pathology, CXR this am pending   · Abx ordered: Will decrease  Cefepime 2grams q12hr (Day #2) to 1gram q12 hr   · WBC count remains normal, 8 7 this am  Patient is afebrile   · Trend temps and WBC count    Heme:   · H/H 11 5/35 1 likely low due to dilution  · Trend hgb and plts  · Transfuse as needed for goal hgb >7    Endo:   · Patient is not diabetic and does not require ISS patient blood glucose has been well controlled during admission    · Glycemic control plan: Blood glucose controlled on current regimen    Msk/Skin:  · Frequent falls: patient has been able to ambulate prior to admission he has had frequent falls with head injury  · Mobility goals: ambulating with assistance   · PT consult: yes  · OT consult: yes  · Frequent turning and off-loading    Family:  · Family updated within 24 hours: yes   · Family meeting planned today: no     Lines:  · ETT at 23 cm  · Lundberg 16 fr  · OG tube     VTE Prophylaxis:  · Pharmacologic Prophylaxis: Lovenox   · Mechanical Prophylaxis: sequential compression device    Disposition: Continue ICU care    Code Status: Level 1 - Full Code    Counseling / Coordination of Care  Total time spent today 41 minutes minutes  Greater than 50% of total time was spent with the patient and / or family counseling and / or coordination of care  A description of the counseling / coordination of care: See above assessment/plan and attending attestation  Patient has diagnosis requiring critical care evaluation and management   ______________________________________________________________________    HPI/24hr events:  Rapid response called to patient's room on the general medical floor for respiratory distress  Patient was satting into the 70 and had to be tracheal intubated  Patient has profound mental delay and was unable to get a review of systems  ______________________________________________________________________    Physical Exam:   Physical Exam  General: Cachectic, responds to verbal stimuli, not in any acute distress  Head:  Hematoma the right forehead  Neck: supple, no JVD, No lymphadenopathy   Cardio: RRR, S1 and S2 +, systolic murmur, No rubs/gallops/clicks  Resp: course breath sounds b/l, no wheezes/rales/rhonchi  Abdomen: soft, NT/ND, BS+  Extremities: no cyanosis or edema  PP 2+ b/l  Neuro: moves all extremities     ______________________________________________________________________  Vitals:    17 0400 17 0437 17 0500 17 0600   BP: 101/56  105/59 103/54   Pulse: 59  59 63   Resp: 16  14 22   Temp: 97 8 °F (36 6 °C)      TempSrc: Temporal      SpO2: 100% 93% 100% 100%   Weight:    47 5 kg (104 lb 11 5 oz)   Height:                  Temperature:   Temp (24hrs), Av °F (36 7 °C), Min:97 5 °F (36 4 °C), Max:99 2 °F (37 3 °C)    Current Temperature: 97 8 °F (36 6 °C)    Weights:   IBW: 50 kg    Body mass index is 20 45 kg/m²    Weight (last 2 days)     Date/Time   Weight    17 0600  47 5 (104 72)    17 195  46 5 (102 51)              Hemodynamic Monitoring:  N/A       Non-Invasive/Invasive Ventilation Settings:  Respiratory    Lab Data (Last 4 hours)    None         O2/Vent Data (Last 4 hours)      11/13 0437           Vent Mode AC/VC       Resp Rate (BPM) (BPM) 14       Vt (mL) (mL) 450       FIO2 (%) (%) 60       PEEP (cmH2O) (cmH2O) 5       MV 5 7                 Lab Results   Component Value Date    PHART 7 452 (H) 11/12/2017    [id number] 33 9 (L) 11/12/2017    PO2ART 276 1 (H) 11/12/2017    [id number] 23 1 11/12/2017    BEART -0 3 11/12/2017    SOURCE Radial, Left 11/12/2017     SpO2: SpO2: 100 %, SpO2 Activity: SpO2 Activity: At Rest, SpO2 Device: O2 Device:  (via vent), Capnography: Capnography: No    Intake and Outputs:  I/O       11/11 0701 - 11/12 0700 11/12 0701 - 11/13 0700    P  O  360     I V  (mL/kg)  2421 9 (51)    NG/GT  30    Total Intake(mL/kg) 360 (6) 2451 9 (51 6)    Urine (mL/kg/hr)  1949 (1 7)    Emesis/NG output  300 (0 3)    Total Output   2249    Net +360 +202 9          Unmeasured Urine Occurrence  1 x        UOP: 0 9cc/hour     Nutrition:        Diet Orders            Start     Ordered    11/12/17 1951  Diet NPO  Diet effective now     Question Answer Comment   Diet Type NPO    RD to adjust diet per protocol?  Yes        11/12/17 1954 11/08/17 1752  Room Service  Once     Question:  Type of Service  Answer:  Room Service- Not Appropriate    11/08/17 1751            Labs:     Results from last 7 days  Lab Units 11/12/17 2107 11/09/17  0609 11/08/17  1422   WBC Thousand/uL 5 90 8 40 11 10*   HEMOGLOBIN g/dL 12 4* 12 8* 14 9   HEMATOCRIT % 37 6* 38 7* 45 3   PLATELETS Thousands/uL 150 145 208   NEUTROS PCT % 82*  --  85*   MONOS PCT % 8  --  7       Results from last 7 days  Lab Units 11/13/17  0553 11/12/17  2107 11/12/17  0623  11/08/17  1422   SODIUM mmol/L 143 144 141  < > 150*   POTASSIUM mmol/L 3 8 4 2 4 1  < > 4 4   CHLORIDE mmol/L 108 107 106  < > 109*   CO2 mmol/L 26 28 28  < > 28   BUN mg/dL 12 13 14  < > 18   CREATININE mg/dL 0 20* 0 30* 0 23*  < > 0 30*   CALCIUM mg/dL 8 2* 8 3 8 1*  < > 9 4   TOTAL PROTEIN g/dL  --   --   --   --  7 8   BILIRUBIN TOTAL mg/dL  --   --   --   --  0 40   ALK PHOS U/L  --   --   --   --  131*   ALT U/L  --   --   --   --  51   AST U/L  --   --   --   --  88*   GLUCOSE RANDOM mg/dL 102 91 87  < > 104   < > = values in this interval not displayed  Results from last 7 days  Lab Units 11/12/17  2107   MAGNESIUM mg/dL 2 0     Lab Results   Component Value Date    PHOS 3 6 11/12/2017          No results found for: TROPONINI    Results from last 7 days  Lab Units 11/12/17  2107   LACTIC ACID mmol/L 1 4     ABG:  Lab Results   Component Value Date    PHART 7 452 (H) 11/12/2017    [id number] 33 9 (L) 11/12/2017    PO2ART 276 1 (H) 11/12/2017    [id number] 23 1 11/12/2017    BEART -0 3 11/12/2017    SOURCE Radial, Left 11/12/2017       Imaging:   Xr Skull < 4 Vw    Result Date: 11/10/2017  Impression: No radiopaque foreign body  Workstation performed: [id number]     Xr Chest Portable    Result Date: 11/13/2017  Impression: ET tube tip above the hong  Workstation performed: [id number]     Xr Chest Portable    Result Date: 11/12/2017  Impression: No active pulmonary disease  Workstation performed: [id number]     Xr Chest 1 View Portable    Result Date: 11/9/2017  Impression: Diminished inspiration  No active pulmonary disease  Workstation performed: [id number]     Mri Brain W Wo Contrast    Result Date: 11/10/2017  Impression: 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 No focal brain parenchymal abnormalities No acute intracranial hemorrhage or mass effect No pathologic enhancement    Workstation performed: [id number]       Micro:  No results found for: Brenda Lamas, SPUTUMCULTUR    Allergies:    Allergies   Allergen Reactions    Haldol [Haloperidol]     Navane [Thiothixene]     Thorazine [Chlorpromazine]      Medications:   Scheduled Meds:  amitriptyline 25 mg Oral HS   ARIPiprazole 30 mg Oral Daily   aspirin 81 mg Oral Daily   atorvastatin 20 mg Oral HS   cefepime 2,000 mg Intravenous Q12H   chlorhexidine 15 mL Swish & Spit Q12H [address]   escitalopram 20 mg Oral Daily   etomidate 12 mg Intravenous Once   omeprazole (PRILOSEC) suspension 2 mg/mL 20 mg Oral Daily   Succinylcholine Chloride 100 mg Intravenous Once   Valproic Acid 250 mg Oral HS   Valproic Acid 250 mg Oral Daily With Lunch   Valproic Acid 750 mg Oral QAM     Continuous Infusions:  dextrose 5 % and sodium chloride 0 45 % 75 mL/hr Last Rate: 75 mL/hr (11/12/17 2025)   propofol 5-50 mcg/kg/min Last Rate: 30 mcg/kg/min (11/12/17 2355)     PRN Meds:    acetaminophen 650 mg Q6H PRN       Invasive lines and devices:   Invasive Devices     Peripheral Intravenous Line            Peripheral IV 11/12/17 less than 1 day    Peripheral IV 11/12/17 Left Forearm less than 1 day    Peripheral IV 11/12/17 Left Hand less than 1 day          Drain            NG/OG/Enteral Tube Orogastric 16 Fr less than 1 day    Urethral Catheter 16 Fr  less than 1 day          Airway            ETT  less than 1 day                   SIGNATURE: Karla Reynoso MD  DATE: November 13, 2017

## 2017-11-13 NOTE — PROGRESS NOTES
Called to bedside by nurse 1255pm  Patient self extubated with restraints in place  Patient placed on 11 of HFNC SpO2 98% no stridor noted  Patient tolerating well       Alejandro Shipman MD

## 2017-11-13 NOTE — PLAN OF CARE
Problem: Nutrition/Hydration-ADULT  Goal: Nutrient/Hydration intake appropriate for improving, restoring or maintaining nutritional needs  Monitor and assess patient's nutrition/hydration status for malnutrition (ex- brittle hair, bruises, dry skin, pale skin and conjunctiva, muscle wasting, smooth red tongue, and disorientation)  Collaborate with interdisciplinary team and initiate plan and interventions as ordered  Monitor patient's weight and dietary intake as ordered or per policy  Utilize nutrition screening tool and intervene per policy  Determine patient's food preferences and provide high-protein, high-caloric foods as appropriate  INTERVENTIONS:  - Monitor oral intake, urinary output, labs, and treatment plans  - Assess nutrition and hydration status and recommend course of action  - Evaluate amount of meals eaten  - Assist patient with eating if necessary   - Allow adequate time for meals  - Recommend/ encourage appropriate diets, oral nutritional supplements, and vitamin/mineral supplements  - Order, calculate, and assess calorie counts as needed  - Assess need for intravenous fluids  - Provide specific nutrition/hydration education as appropriate  - Include patient/family/caregiver in decisions related to nutrition   Outcome: Not Progressing  Pt currently NPO, self-extubated today  Will monitor for diet advancement

## 2017-11-13 NOTE — CONSULTS
Patient was noted to have increasing dyspnea likely from aspiration and was earlier intubated for hypoxic respiratory failure, desaturation in 70's  This afternoon he self extubated

## 2017-11-13 NOTE — PLAN OF CARE
Plan of care cont      DISCHARGE PLANNING - CARE MANAGEMENT     Discharge to post-acute care or home with appropriate resources Progressing        Nutrition/Hydration-ADULT     Nutrient/Hydration intake appropriate for improving, restoring or maintaining nutritional needs Progressing        Potential for Falls     Patient will remain free of falls Progressing        Prexisting or High Potential for Compromised Skin Integrity     Skin integrity is maintained or improved Progressing        RESPIRATORY - ADULT     Achieves optimal ventilation and oxygenation Progressing        SAFETY,RESTRAINT: NV/NON-SELF DESTRUCTIVE BEHAVIOR     Remains free of harm/injury (restraint for non violent/non self-detsructive behavior) Progressing     Returns to optimal restraint-free functioning Progressing        SKIN/TISSUE INTEGRITY - ADULT     Skin integrity remains intact Progressing

## 2017-11-13 NOTE — PROGRESS NOTES
Mallory Pandya made aware patient extubated himself, combative and agitated  Patient placed on 11  HFNC and saturation 98%, no signs of stridor or any respiratory distress noted  VS stable  Precedex increased as per order but patient still combative  One time dose of 1 mg ativan IV given as per order and patient was place on nursing 1 to 1 for safety  Will continue to monitor patient condition

## 2017-11-13 NOTE — RAPID RESPONSE
Progress Note - Rapid Response   Corina Almonte 61 y o  male MRN: [id number]    Time Called ( Time): 2254  Room#: 407  [id number] Time ( Time): 1905  Event End Time ( Time):   Primary reason for call: Acute change in SPO2 and Acute change in mental status  Interventions:  Airway/Breathing:  Intubated  Circulation: N/A  Other Treatments: N/A       Assessment/Plan:   1  Acute hypoxic respiratory failure  · Patient emergently intubated at bedside for hypoxia and respiratory distress  Decompensation may have been secondary to aspiration  No signs of seizure activity noted  · Patient currently on A/C ventilator settings -5-80%  Will repeat ABG and adjust ventilator settings  · Monitor for signs of aspiration pneumonia  Repeat CXR in a m     Closely monitor WBC and temperature  Will currently monitor off antibiotics  · P r n  Xopenex for wheezing  · PPI  2  Acute on chronic encephalopathy  · Patient was admitted with frequent falls  At baseline he has a history of mental retardation, however, according to records on this admission he is not at baseline  Avoid benzos and long acting sedatives  Currently on propofol GTT for sedation  · Monitor sodium levels-continue D5 half-normal saline  · Obtain new labs including BMP, ammonia level, Depakote level  · Neurology service following  3  Ambulatory dysfunction with multiple falls  · Neurology following  · Fall precautions  4  Head injury  5  Chronic dysphagia  · Respiratory failure may have been secondary to aspiration  Patient was being evaluated for dysphagia and was currently NPO for a scheduled barium swallow examination  Continue NPO status, if patient remains intubated will start tube feeds  Once extubated he will need a full dysphagia/speech evaluation  6  Anemia  · Monitor H/H  Transfuse for HGB less than 7 or symptomatic  7  History of mental retardation  · Supportive care  8   Hyperlipidemia  · Continue statin  9  Hypernatremia-resolved  · Patient currently on D5 half-normal saline  Will obtain repeat BMP in a m  and adjust fluids as necessary  Closely monitor  10   Seizure disorder  · Patient is currently on Depakote t i d , will change to Depakote sprinkles  Obtain Depakote level now-was subtherapeutic last checked  11  Protein calorie malnutrition  · Plan to start tube feeds with protein replacement, consult dietary           HPI/Chief Complaint (Background/Situation):   Skyler Fuller is a 61y o  year old male who presents to the hospital on to 11/8/2017 with frequent falls and head trauma  He is being evaluated for worsening encephalopathy as well as dysphagia  A rapid response was called when the patient was found to be minimally responsive and hypoxic  On arrival the patient was hypoxic in the 70s and minimally responsive  He was emergently intubated at bedside  During the intubation a large amount of clear secretions were present in the oropharynx and were also suctioned from the lungs  The patient was transferred to the intensive care unit  The patient's brother was notified of his change in status  Dr Deysi Dixon was present for the rapid response  Historical Information   Past Medical History:   Diagnosis Date    Deviated nasal septum     Eczema     Gastritis     Hyperlipidemia     Impulse control disorder     Insomnia     Mental retardation     Osteoarthritis     Psychiatric disorder     atypical psychosis, impulse control disorder    Ptosis of both eyelids     r > l    Scoliosis      History reviewed  No pertinent surgical history    Social History   History   Alcohol Use No     History   Drug Use No     History   Smoking Status    Never Smoker   Smokeless Tobacco    Never Used     Family History: non-contributory    Meds/Allergies     amitriptyline 25 mg Oral HS   ARIPiprazole 30 mg Oral Daily   [START ON 11/13/2017] aspirin 81 mg Oral Daily   atorvastatin 20 mg Oral HS chlorhexidine 15 mL Swish & Spit Q12H [address]   divalproex sodium 250 mg Oral HS   divalproex sodium 750 mg Oral QAM   divalproex sodium 750 mg Oral After Lunch   escitalopram 20 mg Oral Daily   etomidate 12 mg Intravenous Once   [START ON 11/13/2017] omeprazole (PRILOSEC) suspension 2 mg/mL 20 mg Oral Daily   Succinylcholine Chloride 100 mg Intravenous Once         dextrose 5 % and sodium chloride 0 45 % 75 mL/hr Last Rate: 75 mL/hr (11/12/17 1159)   propofol 5-50 mcg/kg/min Last Rate: 30 mcg/kg/min (11/12/17 3327)       Allergies   Allergen Reactions    Haldol [Haloperidol]     Navane [Thiothixene]     Thorazine [Chlorpromazine]         ROS:  Unable to be obtained secondary to encephalopathy and respiratory failure  Physical Exam:  Gen:  Cachectic, ill-appearing, respiratory distress  HEENT:  Multiple areas of ecchymosis on head as well as left periorbital   Temporal muscle wasting  Neck:  Supple, no JVD  Chest:  Clear to auscultation bilaterally, no wheezing, no crackles  Cor:  Regular rate and rhythm, no murmur  Abd:  Soft, nondistended, normoactive bowel sounds  Ext:  Negative lower extremity edema  Neuro:  Currently sedated, unable to perform a thorough neuro exam   Appears to move all extremities          Intake/Output Summary (Last 24 hours) at 11/12/17 1957  Last data filed at 11/12/17 1159   Gross per 24 hour   Intake              900 ml   Output                0 ml   Net              900 ml       Respiratory    Lab Data (Last 4 hours)    None         O2/Vent Data (Last 4 hours)      11/12 1837           Vent Mode AC/VC       Resp Rate (BPM) (BPM) 14       Vt (mL) (mL) 450       FIO2 (%) (%) 80       PEEP (cmH2O) (cmH2O) 5       Patient safety screen outcome: Passed       MV 11 9                 Invasive Devices     Peripheral Intravenous Line            Peripheral IV 11/12/17 Left Forearm less than 1 day          Airway            ETT  less than 1 day                DIAGNOSTIC DATA:    Lab: I have personally reviewed pertinent lab results  CBC:     Results from last 7 days  Lab Units 11/09/17  0609   WBC Thousand/uL 8 40   HEMOGLOBIN g/dL 12 8*   HEMATOCRIT % 38 7*   PLATELETS Thousands/uL 145     CMP:     Results from last 7 days  Lab Units 11/12/17  0623 11/11/17  0540 11/10/17  0645  11/08/17  1422   SODIUM mmol/L 141 140 144  < > 150*   POTASSIUM mmol/L 4 1 4 0 4 3  < > 4 4   CHLORIDE mmol/L 106 105 105  < > 109*   CO2 mmol/L 28 28 27  < > 28   BUN mg/dL 14 21 30*  < > 18   CREATININE mg/dL 0 23* 0 25* 0 44*  < > 0 30*   CALCIUM mg/dL 8 1* 8 2* 8 8  < > 9 4   TOTAL PROTEIN g/dL  --   --   --   --  7 8   BILIRUBIN TOTAL mg/dL  --   --   --   --  0 40   ALK PHOS U/L  --   --   --   --  131*   ALT U/L  --   --   --   --  51   AST U/L  --   --   --   --  88*   GLUCOSE RANDOM mg/dL 87 92 88  < > 104   < > = values in this interval not displayed  PT/INR:   No results found for: PT, INR,   Magnesium: No components found for: MAG,   Phosphorous: No results found for: PHOS    Microbiology:  No results found for: Denzel Youssef, SPUTUMCULTUR      OUTCOME:   Transferred to Critical Care Unit  Family notified of transfer: YES  Family member contacted:  Brother  Code Status: Level 1 - Full Code  Critical Care Time: Total Critical Care time spent 60 minutes excluding procedures, teaching and family updates

## 2017-11-13 NOTE — RESPIRATORY THERAPY NOTE
Called to pt room on 2 south for rapid response on this patient  Pt was bagged manually with 100% oxygen via ambubag with mask and then intubated, tube patent and secured with leodan and brought to ICU where Jennifer Lynn RRT had set up vent and placed pt on

## 2017-11-14 ENCOUNTER — GENERIC CONVERSION - ENCOUNTER (OUTPATIENT)
Dept: OTHER | Facility: OTHER | Age: 57
End: 2017-11-14

## 2017-11-14 ENCOUNTER — ANESTHESIA (INPATIENT)
Dept: GASTROENTEROLOGY | Facility: AMBULARY SURGERY CENTER | Age: 57
DRG: 913 | End: 2017-11-14
Payer: MEDICARE

## 2017-11-14 ENCOUNTER — APPOINTMENT (INPATIENT)
Dept: RADIOLOGY | Facility: HOSPITAL | Age: 57
DRG: 913 | End: 2017-11-14
Payer: MEDICARE

## 2017-11-14 PROBLEM — J98.11 ATELECTASIS: Status: ACTIVE | Noted: 2017-11-14

## 2017-11-14 PROBLEM — N39.0 UTI (URINARY TRACT INFECTION): Status: ACTIVE | Noted: 2017-11-14

## 2017-11-14 PROBLEM — J96.01 ACUTE RESPIRATORY FAILURE WITH HYPOXIA (HCC): Status: ACTIVE | Noted: 2017-11-08

## 2017-11-14 PROBLEM — T17.500A MUCUS PLUGGING OF BRONCHI: Status: ACTIVE | Noted: 2017-11-14

## 2017-11-14 LAB
ANION GAP SERPL CALCULATED.3IONS-SCNC: 8 MMOL/L (ref 4–13)
BACTERIA SPT RESP CULT: ABNORMAL
BACTERIA SPT RESP CULT: ABNORMAL
BASOPHILS # BLD AUTO: 0 THOUSANDS/ΜL (ref 0–0.1)
BASOPHILS NFR BLD AUTO: 0 % (ref 0–1)
BUN SERPL-MCNC: 7 MG/DL (ref 5–25)
CALCIUM SERPL-MCNC: 8.3 MG/DL (ref 8.3–10.1)
CHLORIDE SERPL-SCNC: 110 MMOL/L (ref 100–108)
CO2 SERPL-SCNC: 26 MMOL/L (ref 21–32)
CREAT SERPL-MCNC: 0.22 MG/DL (ref 0.6–1.3)
EOSINOPHIL # BLD AUTO: 0.1 THOUSAND/ΜL (ref 0–0.61)
EOSINOPHIL NFR BLD AUTO: 1 % (ref 0–6)
ERYTHROCYTE [DISTWIDTH] IN BLOOD BY AUTOMATED COUNT: 14.4 % (ref 11.6–15.1)
GFR SERPL CREATININE-BSD FRML MDRD: 167 ML/MIN/1.73SQ M
GLUCOSE SERPL-MCNC: 95 MG/DL (ref 65–140)
GRAM STN SPEC: ABNORMAL
HCT VFR BLD AUTO: 34.7 % (ref 42–52)
HGB BLD-MCNC: 11.5 G/DL (ref 14–18)
LYMPHOCYTES # BLD AUTO: 0.7 THOUSANDS/ΜL (ref 0.6–4.47)
LYMPHOCYTES NFR BLD AUTO: 8 % (ref 14–44)
MAGNESIUM SERPL-MCNC: 2.3 MG/DL (ref 1.6–2.6)
MCH RBC QN AUTO: 28.7 PG (ref 27–31)
MCHC RBC AUTO-ENTMCNC: 33.1 G/DL (ref 31.4–37.4)
MCV RBC AUTO: 87 FL (ref 82–98)
MONOCYTES # BLD AUTO: 0.6 THOUSAND/ΜL (ref 0.17–1.22)
MONOCYTES NFR BLD AUTO: 7 % (ref 4–12)
MRSA NOSE QL CULT: NORMAL
NEUTROPHILS # BLD AUTO: 7.3 THOUSANDS/ΜL (ref 1.85–7.62)
NEUTS SEG NFR BLD AUTO: 84 % (ref 43–75)
PHOSPHATE SERPL-MCNC: 3.2 MG/DL (ref 2.7–4.5)
PLATELET # BLD AUTO: 118 THOUSANDS/UL (ref 130–400)
PMV BLD AUTO: 7.6 FL (ref 8.9–12.7)
POTASSIUM SERPL-SCNC: 3.7 MMOL/L (ref 3.5–5.3)
RBC # BLD AUTO: 4 MILLION/UL (ref 4.7–6.1)
SODIUM SERPL-SCNC: 144 MMOL/L (ref 136–145)
WBC # BLD AUTO: 8.7 THOUSAND/UL (ref 4.8–10.8)

## 2017-11-14 PROCEDURE — 94760 N-INVAS EAR/PLS OXIMETRY 1: CPT

## 2017-11-14 PROCEDURE — 83735 ASSAY OF MAGNESIUM: CPT | Performed by: FAMILY MEDICINE

## 2017-11-14 PROCEDURE — 71010 HB CHEST X-RAY 1 VIEW FRONTAL (PORTABLE): CPT

## 2017-11-14 PROCEDURE — 87102 FUNGUS ISOLATION CULTURE: CPT | Performed by: INTERNAL MEDICINE

## 2017-11-14 PROCEDURE — 92526 ORAL FUNCTION THERAPY: CPT

## 2017-11-14 PROCEDURE — 0BC38ZZ EXTIRPATION OF MATTER FROM RIGHT MAIN BRONCHUS, VIA NATURAL OR ARTIFICIAL OPENING ENDOSCOPIC: ICD-10-PCS | Performed by: INTERNAL MEDICINE

## 2017-11-14 PROCEDURE — 85025 COMPLETE CBC W/AUTO DIFF WBC: CPT | Performed by: FAMILY MEDICINE

## 2017-11-14 PROCEDURE — 94640 AIRWAY INHALATION TREATMENT: CPT

## 2017-11-14 PROCEDURE — 87206 SMEAR FLUORESCENT/ACID STAI: CPT | Performed by: INTERNAL MEDICINE

## 2017-11-14 PROCEDURE — 80048 BASIC METABOLIC PNL TOTAL CA: CPT | Performed by: FAMILY MEDICINE

## 2017-11-14 PROCEDURE — 87116 MYCOBACTERIA CULTURE: CPT | Performed by: INTERNAL MEDICINE

## 2017-11-14 PROCEDURE — 87070 CULTURE OTHR SPECIMN AEROBIC: CPT | Performed by: INTERNAL MEDICINE

## 2017-11-14 PROCEDURE — 0BC78ZZ EXTIRPATION OF MATTER FROM LEFT MAIN BRONCHUS, VIA NATURAL OR ARTIFICIAL OPENING ENDOSCOPIC: ICD-10-PCS | Performed by: INTERNAL MEDICINE

## 2017-11-14 PROCEDURE — 84100 ASSAY OF PHOSPHORUS: CPT | Performed by: FAMILY MEDICINE

## 2017-11-14 RX ORDER — QUETIAPINE FUMARATE 25 MG/1
25 TABLET, FILM COATED ORAL
Status: DISCONTINUED | OUTPATIENT
Start: 2017-11-14 | End: 2017-11-15

## 2017-11-14 RX ORDER — ACETYLCYSTEINE 200 MG/ML
3 SOLUTION ORAL; RESPIRATORY (INHALATION)
Status: DISCONTINUED | OUTPATIENT
Start: 2017-11-14 | End: 2017-11-17

## 2017-11-14 RX ORDER — POTASSIUM CHLORIDE 29.8 MG/ML
40 INJECTION INTRAVENOUS ONCE
Status: DISCONTINUED | OUTPATIENT
Start: 2017-11-14 | End: 2017-11-14 | Stop reason: SDUPTHER

## 2017-11-14 RX ORDER — GLYCOPYRROLATE 0.2 MG/ML
INJECTION INTRAMUSCULAR; INTRAVENOUS AS NEEDED
Status: DISCONTINUED | OUTPATIENT
Start: 2017-11-14 | End: 2017-11-14 | Stop reason: SURG

## 2017-11-14 RX ORDER — PROPOFOL 10 MG/ML
INJECTION, EMULSION INTRAVENOUS CONTINUOUS PRN
Status: DISCONTINUED | OUTPATIENT
Start: 2017-11-14 | End: 2017-11-14 | Stop reason: SURG

## 2017-11-14 RX ORDER — IPRATROPIUM BROMIDE AND ALBUTEROL SULFATE 2.5; .5 MG/3ML; MG/3ML
3 SOLUTION RESPIRATORY (INHALATION)
Status: DISCONTINUED | OUTPATIENT
Start: 2017-11-14 | End: 2017-11-28 | Stop reason: HOSPADM

## 2017-11-14 RX ORDER — LIDOCAINE HYDROCHLORIDE 20 MG/ML
INJECTION, SOLUTION INFILTRATION; PERINEURAL AS NEEDED
Status: DISCONTINUED | OUTPATIENT
Start: 2017-11-14 | End: 2017-11-14 | Stop reason: HOSPADM

## 2017-11-14 RX ORDER — POTASSIUM CHLORIDE 14.9 MG/ML
20 INJECTION INTRAVENOUS ONCE
Status: COMPLETED | OUTPATIENT
Start: 2017-11-14 | End: 2017-11-15

## 2017-11-14 RX ORDER — PROPOFOL 10 MG/ML
INJECTION, EMULSION INTRAVENOUS AS NEEDED
Status: DISCONTINUED | OUTPATIENT
Start: 2017-11-14 | End: 2017-11-14 | Stop reason: SURG

## 2017-11-14 RX ORDER — MAGNESIUM HYDROXIDE 1200 MG/15ML
LIQUID ORAL AS NEEDED
Status: DISCONTINUED | OUTPATIENT
Start: 2017-11-14 | End: 2017-11-14 | Stop reason: HOSPADM

## 2017-11-14 RX ADMIN — ARIPIPRAZOLE 30 MG: 10 TABLET ORAL at 08:31

## 2017-11-14 RX ADMIN — ESCITALOPRAM OXALATE 20 MG: 10 TABLET ORAL at 08:27

## 2017-11-14 RX ADMIN — PROPOFOL 50 MG: 10 INJECTION, EMULSION INTRAVENOUS at 11:48

## 2017-11-14 RX ADMIN — CHLORHEXIDINE GLUCONATE 15 ML: 1.2 RINSE ORAL at 20:46

## 2017-11-14 RX ADMIN — Medication 1 TABLET: at 21:41

## 2017-11-14 RX ADMIN — VALPROIC ACID 750 MG: 250 SOLUTION ORAL at 08:29

## 2017-11-14 RX ADMIN — IPRATROPIUM BROMIDE AND ALBUTEROL SULFATE 3 ML: .5; 3 SOLUTION RESPIRATORY (INHALATION) at 19:48

## 2017-11-14 RX ADMIN — VALPROIC ACID 250 MG: 250 SOLUTION ORAL at 21:41

## 2017-11-14 RX ADMIN — ASPIRIN 81 MG 81 MG: 81 TABLET ORAL at 08:27

## 2017-11-14 RX ADMIN — POTASSIUM CHLORIDE 20 MEQ: 200 INJECTION, SOLUTION INTRAVENOUS at 15:50

## 2017-11-14 RX ADMIN — ATORVASTATIN CALCIUM 20 MG: 20 TABLET, FILM COATED ORAL at 21:41

## 2017-11-14 RX ADMIN — ENOXAPARIN SODIUM 40 MG: 40 INJECTION SUBCUTANEOUS at 08:27

## 2017-11-14 RX ADMIN — CHLORHEXIDINE GLUCONATE 15 ML: 1.2 RINSE ORAL at 08:29

## 2017-11-14 RX ADMIN — Medication 20 MG: at 08:34

## 2017-11-14 RX ADMIN — SODIUM CHLORIDE 3 G: 9 INJECTION, SOLUTION INTRAVENOUS at 18:00

## 2017-11-14 RX ADMIN — QUETIAPINE FUMARATE 25 MG: 25 TABLET ORAL at 21:44

## 2017-11-14 RX ADMIN — IPRATROPIUM BROMIDE AND ALBUTEROL SULFATE 3 ML: .5; 3 SOLUTION RESPIRATORY (INHALATION) at 14:57

## 2017-11-14 RX ADMIN — ACETYLCYSTEINE 600 MG: 200 SOLUTION ORAL; RESPIRATORY (INHALATION) at 14:58

## 2017-11-14 RX ADMIN — POTASSIUM CHLORIDE 20 MEQ: 200 INJECTION, SOLUTION INTRAVENOUS at 12:18

## 2017-11-14 RX ADMIN — SODIUM CHLORIDE 3 G: 9 INJECTION, SOLUTION INTRAVENOUS at 23:59

## 2017-11-14 RX ADMIN — CEFEPIME HYDROCHLORIDE 1000 MG: 1 INJECTION, POWDER, FOR SOLUTION INTRAMUSCULAR; INTRAVENOUS at 10:17

## 2017-11-14 RX ADMIN — GLYCOPYRROLATE 0.2 MG: 0.2 INJECTION, SOLUTION INTRAMUSCULAR; INTRAVENOUS at 11:46

## 2017-11-14 RX ADMIN — ACETYLCYSTEINE 600 MG: 200 SOLUTION ORAL; RESPIRATORY (INHALATION) at 19:48

## 2017-11-14 RX ADMIN — VALPROIC ACID 750 MG: 250 SOLUTION ORAL at 12:19

## 2017-11-14 RX ADMIN — PROPOFOL 70 MCG/KG/MIN: 10 INJECTION, EMULSION INTRAVENOUS at 11:46

## 2017-11-14 RX ADMIN — AMITRIPTYLINE HYDROCHLORIDE 25 MG: 10 TABLET, FILM COATED ORAL at 21:41

## 2017-11-14 NOTE — CASE MANAGEMENT
Continued Stay Review    Date: 11/14/17    Vital Signs: /73   Pulse 73   Temp 98 2 °F (36 8 °C) (Temporal)   Resp (!) 35   Ht 5' 3" (1 6 m)   Wt 47 5 kg (104 lb 11 5 oz)   SpO2 100%   BMI 18 55 kg/m²     Medications:   Scheduled Meds:   acetylcysteine 3 mL Nebulization Q6H   amitriptyline 25 mg Oral HS   ARIPiprazole 30 mg Oral Daily   aspirin 81 mg Oral Daily   atorvastatin 20 mg Oral HS   cefepime 1,000 mg Intravenous Q12H   chlorhexidine 15 mL Swish & Spit Q12H SHERICE   enoxaparin 40 mg Subcutaneous Q24H SHERICE   escitalopram 20 mg Oral Daily   ipratropium-albuterol 3 mL Nebulization Q6H   senna-docusate sodium 1 tablet Oral HS   Valproic Acid 250 mg Oral HS   Valproic Acid 750 mg Oral QAM   Valproic Acid 750 mg Oral Daily With Lunch     Continuous Infusions:    PRN Meds:     Abnormal Labs/Diagnostic Results:   HGB 11 5 PLT  118  CR 0 22   CXR=Completely opacified left lung likely related to left lung atelectasis, perhaps related to mucous plugging  Age/Sex: 61 y o  male     Assessment/Plan:   HPI/24hr events:  No acute events overnight patient was extubated yesterday and has tolerated nasal cannula with high-flow  Patient has had increased upper airway secretions  Patient has developmental delay and unable to complete a review of systems  Patient remains afebrile and hemodynamically stable  Principal Problem:    Head injury  Active Problems:    Mental retardation    Frequent falls  57-y/o male, from [facility], PMHx profound mental retardation, chronic dysphagia, admitted for frequent falls with head injury, h/o hypernatremia   ICU Rapid response called on general medical floor for acute respiratory distress due to hypoxic respiratory failure   Patient desatted into the 70s and required endotracheal intubation  Patient was extubated yesterday and is on high-flow nasal cannula at 10 liters tolerating well with oxygen saturation in the high 90s    Patient chest x-ray today shows complete opacification of the left side likely atelectasis 2/2 to mucus plugging  Will be scheduled for bronchoscopy later today  Plan:  Neuro:   · Frequent falls with head injury: Patient previously able to ambulate  Neurology following  MRI on 11/10 no acute changes, intracranial hemorrhage or mass effect   Avoid long-acting as sedatives  · Chronic dysphagia: speech and swallow evaluation   Barium swallow study ordered   · Insomnia: patient currently on amitriptyline 25 mg q h s  · Depression/mental delay:  Lexapro 20 mg daily, Abilify 30 mg daily,   · H/o seizure activity: patient on Depakote p o  750 mg q a m , afternoon , and 250 mg q h s  patient currently on valproic acid oral solution 750 mg q a m , 250 mg at lunch, 250 mg q h s  · Pain controlled with:  Tylenol 650 mg q 6 hours p r n  for mild pain  ? Pain score: none  · Regulate sleep/wake cycle  CV:   · Systolic Murmur: order echocardiogram results pending  · patient is on aspirin 81 mg daily  · HLD: Lipitor 20mg daily   · Rhythm: NSR  ? Follow rhythm on telemetry  Pulm:   · Complete Left sided lung opacity on chest x-ray: pleural effusion vs atelectasis 2/2 to mucus plugging: U/S done at bedside more likely mucus plugging  ? Patient scheduled for bronchoscopy later today  · Hypoxic respiratory failure: extubated yesterday, tolerating well on HFNC at 10L  Will wean as tolerated  ? HOB >30 degrees: yes   GI:   · Nutrition/diet plan: speech and swallow evaluation: puree with pudding thick liquids  Patient would not tolerate barium swallow evaluation    · Protein calorie malnutrition: Will start diet as above   · Stress ulcer prophylaxis: Omeprazole 20 mg oral suspension will discontinue   · Bowel regimen:  Sennakot S   ? Last BM:  None on record  FEN:   · Hypernatremia:  Resolved  · IV fluid:  D5 half-NS at 75 cc/hour  · Fluid/Diuretic plan: No intervention  · Electrolytes repleted: yes Mg sulfate IVPB 2 g  ?  Goal: K >4 0, Mag >2 0, and Phos >3 0  :   · Possible UTI   · BUN and creatinine 7 and 0 22 this a m  within acceptable limits   Continue to monitor renal function  · Indwelling Lundberg present: yes   ? Urinary catheter still needed for Strict I and O in a critically ill patient  ID:   · UTI: UA showed Moderate leuks and large blood   UCx pending  · Risk for Aspiration Pneumonia: CXR complete opacificationof left side likley atelectasis 2/2 to mucus plugging   · Abx ordered: Will decrease  Cefepime 1gram q12 hr (day #3)   · WBC count remains normal, 8 7 this am  Patient is afebrile   · Trend temps and WBC count  Heme:   · H/H 11 5/34 7 stable   ·  goal hgb >7  Endo:   · Patient is not diabetic and does not require ISS patient blood glucose has been well controlled during admission  · Glycemic control plan: Blood glucose controlled on current regimen  Msk/Skin:  · Frequent falls: patient has been able to ambulate prior to admission he has had frequent falls with head injury  · Mobility goals: ambulating with assistance   ? PT consult: yes  ?  OT consult: yes  · Frequent turning and off-loading  Discharge Plan: RETURN TO [facility]

## 2017-11-14 NOTE — SPEECH THERAPY NOTE
SLP  Swallow Therapy Note    S:  Patient alert, restless in bed with significant upper airway secretions audible  S/p self-extubation  No pain evident  Sitter at bedside  O:  Patient is currently NPO except medications in a puree bolus  RN indicated patient swallowed medications crushed in apple sauce without difficulty  Reassessed patient bedside with puree  Adequate oral reception and containment  Preparation, bolus formation, and transit are mildly delayed to timely, with timely to mildly delayed swallow initiation observed  Laryngeal rise is good  No immediate post swallow coughing observed and no change in O2 saturation appreciated following all PO intakes of puree  MBS study ordered/ planned; however, patient unable to maintain appropriate positioning due to constant motion, would not be able to cooperate with study  Resident indicated patient is having a Bronchoscopy today  Suggest trialing a very conservative PO diet of puree and pudding thick liquids for now after Bronchoscopy is completed  A:  Oral pharyngeal swallow skills are safe/ WFL for at least puree and pudding thick liquids  Patient with baseline upper airway congestion with inability to mobilize/ clear secretions  P:  Recommend:  1  Trial Dysphagia 1/ Puree Diet and Pudding Thick liquids  2   Medications crushed in a puree bolus  3   Feed all meals slowly, in a fully upright position and as max alert  4   Maintain strict aspiration and reflux precautions! D/w RN, MD, and X-ray  Will continue to follow safe swallow skills

## 2017-11-14 NOTE — PERIOPERATIVE NURSING NOTE
Pt tolerated bronchoscopy  Pt  To complete recovery in ICU  Report given to Ace Rea rn who also observed Procedure

## 2017-11-14 NOTE — RESPIRATORY THERAPY NOTE
Received pt this am on 11 lpm nc  Bronchoscopy performed bedside  Neb txs started this afternoon along with vest therapy  Resting comfortably this afternoon on 5 lpm nc up in chair

## 2017-11-14 NOTE — ANESTHESIA POSTPROCEDURE EVALUATION
Post-Op Assessment Note      CV Status:  Stable    Mental Status:  Lethargic    Hydration Status:  Stable    PONV Controlled:  None    Airway Patency:  Patent    Post Op Vitals Reviewed: Yes          Staff: Anesthesiologist           BP (!) 84/51 (11/14/17 1203)    Temp      Pulse 85 (11/14/17 1203)   Resp     SpO2 100 % (11/14/17 1203)

## 2017-11-14 NOTE — PROGRESS NOTES
Daily Progress Note - Critical Care/ Portiae Rafaela 61 y o  male MRN: [id number]  Unit/Bed#: ICU 01 Encounter: [id number]    ______________________________________________________________________  Assessment:   Principal Problem:    Head injury  Active Problems:    Mental retardation    Frequent falls  Resolved Problems:    * No resolved hospital problems  *    57-y/o male, from [address], PMHx profound mental retardation, chronic dysphagia, admitted for frequent falls with head injury, h/o hypernatremia  ICU Rapid response called on general medical floor for acute respiratory distress due to hypoxic respiratory failure  Patient desatted into the 76s and required endotracheal intubation  Patient was extubated yesterday and is on high-flow nasal cannula at 10 liters tolerating well with oxygen saturation in the high 90s  Patient chest x-ray today shows complete opacification of the left side likely atelectasis 2/2 to mucus plugging  Will be scheduled for bronchoscopy later today  Plan:    Neuro:   · Frequent falls with head injury: Patient previously able to ambulate  Neurology following  MRI on 11/10 no acute changes, intracranial hemorrhage or mass effect  Avoid long-acting as sedatives  · Chronic dysphagia: speech and swallow evaluation  Barium swallow study ordered   · Insomnia: patient currently on amitriptyline 25 mg q h s  · Depression/mental delay:  Lexapro 20 mg daily, Abilify 30 mg daily,   · H/o seizure activity: patient on Depakote p o  750 mg q a m , afternoon , and 250 mg q h s  patient currently on valproic acid oral solution 750 mg q a m , 250 mg at lunch, 250 mg q h s  · Pain controlled with:  Tylenol 650 mg q 6 hours p r n  for mild pain  ? Pain score: none  · Regulate sleep/wake cycle     CV:   · Systolic Murmur: order echocardiogram results pending  · patient is on aspirin 81 mg daily  · HLD: Lipitor 20mg daily   · Rhythm: NSR  ?  Follow rhythm on telemetry     Pulm:   · Complete Left sided lung opacity on chest x-ray: pleural effusion vs atelectasis 2/2 to mucus plugging: U/S done at bedside more likely mucus plugging  S/p bronchoscopy tolerated well  · PNA 2/2 to possible aspiration: sputum growing 2+ Moraxellacatarrhalis ,  will discontinue cefepime order unasyn 3g q 6 hr   · ,Respiratory protocol  chest PT, due-neb q 6hr, Duo-neb q6hr  ? Patient scheduled for bronchoscopy later today  · Hypoxic respiratory failure: extubated yesterday, tolerating well on HFNC at 10L  Will wean as tolerated  ? HOB >30 degrees: yes      GI:   · Nutrition/diet plan: speech and swallow evaluation: puree with pudding thick liquids  Patient would not tolerate barium swallow evaluation    · Protein calorie malnutrition: Will start diet as above   · Stress ulcer prophylaxis: Omeprazole 20 mg oral suspension will discontinue   · Bowel regimen:  Sennakot S   ? Last BM:  None on record     FEN:   · Hypernatremia:  Resolved  · IV fluid:  D5 half-NS at 75 cc/hour  · Fluid/Diuretic plan: No intervention  · Electrolytes repleted: yes Mg sulfate IVPB 2 g  ? Goal: K >4 0, Mag >2 0, and Phos >3 0     :   · UTI: Ucx >100,000 cfu E  Coli   · BUN and creatinine 7 and 0 22 this a m  within acceptable limits  Continue to monitor renal function  · Indwelling Lundberg present: yes   ? Urinary catheter still needed for Strict I and O in a critically ill patient      ID:   · UTI: UA showed Moderate leuks and large blood  UCx >100K E  Coli  · Risk for Aspiration Pneumonia: CXR complete opacification of left side likley atelectasis 2/2 to mucus plugging  Sputum Cx 2+ Moraxella catarrhalis    · Abx ordered: Will discontinue Cefepime 1gram q12 hr (day #3) and start Unasyn 3g q 6hr    · WBC count remains normal, 8 7 this am  Patient is afebrile   · Trend temps and WBC count     Heme:   · H/H 11 5/34 7 stable   ·  goal hgb >7     Endo:   · Patient is not diabetic and does not require ISS patient blood glucose has been well controlled during admission  · Glycemic control plan: Blood glucose controlled on current regimen     Msk/Skin:  · Frequent falls: patient has been able to ambulate prior to admission he has had frequent falls with head injury  · Mobility goals: ambulating with assistance   ? PT consult: yes  ? OT consult: yes  · Frequent turning and off-loading     Family:  · Family updated within 24 hours: yes   · Family meeting planned today: no      Lines:  · Lundberg 16 fr if medically stable will d/c today  VTE Prophylaxis:  · Pharmacologic Prophylaxis: Enoxaparin (Lovenox)  · Mechanical Prophylaxis: sequential compression device    Disposition: Continue ICU care    Code Status: Level 1 - Full Code    Counseling / Coordination of Care  Total time spent today 40 minutes  Greater than 50% of total time was spent with the patient and / or family counseling and / or coordination of care  A description of the counseling / coordination of care: See above assessment/plan and attending attestation  Patient has diagnosis requiring critical care evaluation and management   ______________________________________________________________________    HPI/24hr events:  No acute events overnight patient was extubated yesterday and has tolerated nasal cannula with high-flow  Patient has had increased upper airway secretions  Patient has developmental delay and unable to complete a review of systems  Patient remains afebrile and hemodynamically stable      ______________________________________________________________________    Physical Exam:   Physical Exam   Constitutional: No distress  Nasal cannula in place  Patient is alert although due to developmental delay as unable to articulate but follows commands  Patient is in a soft mitt and soft belt restraints   HENT:   Head: Normocephalic and atraumatic  Eyes: EOM are normal  Pupils are equal, round, and reactive to light  No scleral icterus     Neck: No JVD present  Cardiovascular: Normal rate, regular rhythm and intact distal pulses  Exam reveals no gallop and no friction rub  Murmur (Systolic) heard  Pulmonary/Chest: Effort normal  No respiratory distress  He has no wheezes  He has no rales  He exhibits no tenderness  - On 10L high flow NC  - decreased breath sounds on the left  Transmitted upper airway sounds otherwise clear to auscultation on the right   Abdominal: Soft  Bowel sounds are normal  He exhibits no distension and no mass  There is no tenderness  There is no rebound and no guarding  Musculoskeletal: He exhibits no edema, tenderness or deformity  Lymphadenopathy:     He has no cervical adenopathy  Neurological: He is alert  Skin: No rash noted  He is not diaphoretic  No erythema  No pallor  ______________________________________________________________________  Vitals:    17 0400 17 0430 17 0500 17 0600   BP: 102/58  123/60 100/57   Pulse: 74  76 83   Resp: (!) 29  (!) 26 (!) 29   Temp:  97 9 °F (36 6 °C)     TempSrc:       SpO2: 100%  100% 97%   Weight:       Height:                  Temperature:   Temp (24hrs), Av 2 °F (36 8 °C), Min:97 9 °F (36 6 °C), Max:98 5 °F (36 9 °C)    Current Temperature: 97 9 °F (36 6 °C)    Weights:   IBW: 56 9 kg    Body mass index is 18 55 kg/m²    Weight (last 2 days)     Date/Time   Weight    17 0600  47 5 (104 72)    17 1955  46 5 (102 51)              Hemodynamic Monitoring:  N/A       Non-Invasive/Invasive Ventilation Settings:  Respiratory    Lab Data (Last 4 hours)    None         O2/Vent Data (Last 4 hours)    None              No results found for: PHART, SHS2WZJ, PO2ART, PFE7AQI, B9EXVCAQ, BEART, SOURCE  SpO2: SpO2: 100 %, SpO2 Activity: SpO2 Activity: At Rest, SpO2 Device: O2 Device: Nasal cannula, Capnography: Capnography: No    Intake and Outputs:  I/O       701 -  0700 701 -  07    I V  (mL/kg) 2425 9 (27) 7664 6 (41 4)    NG/GT 30 60    IV Piggyback  150    Total Intake(mL/kg) 2451 9 (51 6) 2178 5 (45 9)    Urine (mL/kg/hr) 1949 (1 7) 820 (0 7)    Emesis/NG output 300 (0 3) 150 (0 1)    Total Output 2249 970    Net +202 9 +1208 5          Unmeasured Urine Occurrence 1 x         UOP: 0 6/hour     Nutrition:        Diet Orders            Start     Ordered    11/12/17 1951  Diet NPO  Diet effective now     Question Answer Comment   Diet Type NPO    RD to adjust diet per protocol? Yes        11/12/17 1954 11/08/17 1752  Room Service  Once     Question:  Type of Service  Answer:  Room Service- Not Appropriate    11/08/17 1751            Labs:     Results from last 7 days  Lab Units 11/14/17 0517 11/13/17 0553 11/12/17 2107 11/08/17  1422   WBC Thousand/uL 8 70 8 70 5 90  < > 11 10*   HEMOGLOBIN g/dL 11 5* 11 5* 12 4*  < > 14 9   HEMATOCRIT % 34 7* 35 1* 37 6*  < > 45 3   PLATELETS Thousands/uL 118* 143 150  < > 208   NEUTROS PCT % 84*  --  82*  --  85*   MONOS PCT % 7  --  8  --  7   MONO PCT MAN %  --  9  --   --   --    < > = values in this interval not displayed  Results from last 7 days  Lab Units 11/14/17 0517 11/13/17  0553 11/12/17 2107 11/08/17  1422   SODIUM mmol/L 144 143 144  < > 150*   POTASSIUM mmol/L 3 7 3 8 4 2  < > 4 4   CHLORIDE mmol/L 110* 108 107  < > 109*   CO2 mmol/L 26 26 28  < > 28   BUN mg/dL 7 12 13  < > 18   CREATININE mg/dL 0 22* 0 20* 0 30*  < > 0 30*   CALCIUM mg/dL 8 3 8 2* 8 3  < > 9 4   TOTAL PROTEIN g/dL  --   --   --   --  7 8   BILIRUBIN TOTAL mg/dL  --   --   --   --  0 40   ALK PHOS U/L  --   --   --   --  131*   ALT U/L  --   --   --   --  51   AST U/L  --   --   --   --  88*   GLUCOSE RANDOM mg/dL 95 102 91  < > 104   < > = values in this interval not displayed      Results from last 7 days  Lab Units 11/14/17  0517 11/13/17  0553 11/12/17  2107   MAGNESIUM mg/dL 2 3 1 9 2 0     Lab Results   Component Value Date    PHOS 3 2 11/14/2017    PHOS 3 2 11/13/2017    PHOS 3 6 11/12/2017          No results found for: Rajesh Rodriguez    Results from last 7 days  Lab Units 11/12/17  2107   LACTIC ACID mmol/L 1 4     ABG:  Lab Results   Component Value Date    PHART 7 452 (H) 11/12/2017    NOS6CFG 33 9 (L) 11/12/2017    PO2ART 276 1 (H) 11/12/2017    NEO8VZE 23 1 11/12/2017    BEART -0 3 11/12/2017    SOURCE Radial, Left 11/12/2017       Imaging:   Xr Chest portable 11/14/17  Completely opacified left lung likely related to left lung atelectasis, perhaps related to mucous plugging  Xr Skull < 4 Vw    Result Date: 11/10/2017  Impression: No radiopaque foreign body  Workstation performed: [id number]     Xr Chest Portable    Result Date: 11/13/2017  Impression: ET tube tip above the hong  Workstation performed: [id number]     Xr Chest Portable    Result Date: 11/12/2017  Impression: No active pulmonary disease  Workstation performed: [id number]     Xr Chest 1 View Portable    Result Date: 11/9/2017  Impression: Diminished inspiration  No active pulmonary disease  Workstation performed: [id number]     Mri Brain W Wo Contrast    Result Date: 11/10/2017  Impression: 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 No focal brain parenchymal abnormalities No acute intracranial hemorrhage or mass effect No pathologic enhancement    Workstation performed: [id number]     Xr Chest Portable Icu    Result Date: 11/13/2017  Impression: 1  Evolving left lower lobe infiltrate representing atelectasis or pneumonia  2   Low-lying endotracheal tube with tip projecting towards the left mainstem bronchus  Repositioning is recommended  3   Malpositioned orogastric tube with tip projecting into the EG junction  Repositioning recommended  ##sigslh##sigslh Workstation performed: [id number]       Micro:  Lab Results   Component Value Date    SPUTUMCULTUR 3+ Growth of  11/12/2017       Allergies:    Allergies   Allergen Reactions    Haldol [Haloperidol]     Navane [Thiothixene]     Thorazine [Chlorpromazine]      Medications:   Scheduled Meds:  amitriptyline 25 mg Oral HS   ARIPiprazole 30 mg Oral Daily   aspirin 81 mg Oral Daily   atorvastatin 20 mg Oral HS   cefepime 1,000 mg Intravenous Q12H   chlorhexidine 15 mL Swish & Spit Q12H [address]   enoxaparin 40 mg Subcutaneous Q24H SHERICE   escitalopram 20 mg Oral Daily   etomidate 12 mg Intravenous Once   omeprazole (PRILOSEC) suspension 2 mg/mL 20 mg Oral Daily   senna-docusate sodium 1 tablet Oral HS   Succinylcholine Chloride 100 mg Intravenous Once   Valproic Acid 250 mg Oral HS   Valproic Acid 750 mg Oral QAM   Valproic Acid 750 mg Oral Daily With Lunch     Continuous Infusions:  dexmedetomidine 0 1-1 mcg/kg/hr Last Rate: Stopped (11/13/17 1710)   dextrose 5 % and sodium chloride 0 45 % 75 mL/hr Last Rate: 75 mL/hr (11/13/17 2202)     PRN Meds:    acetaminophen 650 mg Q6H PRN       Invasive lines and devices:   Invasive Devices     Peripheral Intravenous Line            Peripheral IV 11/12/17 Left Forearm 1 day    Peripheral IV 11/12/17 Left Hand 1 day    Peripheral IV 11/12/17 Right Arm 1 day          Drain            Urethral Catheter 16 Fr  1 day                   SIGNATURE: Selina Raman MD  DATE: November 14, 2017

## 2017-11-15 ENCOUNTER — GENERIC CONVERSION - ENCOUNTER (OUTPATIENT)
Dept: OTHER | Facility: OTHER | Age: 57
End: 2017-11-15

## 2017-11-15 ENCOUNTER — APPOINTMENT (INPATIENT)
Dept: RADIOLOGY | Facility: HOSPITAL | Age: 57
DRG: 913 | End: 2017-11-15
Payer: MEDICARE

## 2017-11-15 LAB
ANION GAP SERPL CALCULATED.3IONS-SCNC: 6 MMOL/L (ref 4–13)
BACTERIA UR CULT: ABNORMAL
BUN SERPL-MCNC: 4 MG/DL (ref 5–25)
CALCIUM SERPL-MCNC: 8.2 MG/DL (ref 8.3–10.1)
CHLORIDE SERPL-SCNC: 111 MMOL/L (ref 100–108)
CO2 SERPL-SCNC: 29 MMOL/L (ref 21–32)
CREAT SERPL-MCNC: <0.15 MG/DL (ref 0.6–1.3)
ERYTHROCYTE [DISTWIDTH] IN BLOOD BY AUTOMATED COUNT: 15.3 % (ref 11.6–15.1)
GLUCOSE SERPL-MCNC: 79 MG/DL (ref 65–140)
HCT VFR BLD AUTO: 32 % (ref 42–52)
HGB BLD-MCNC: 10.4 G/DL (ref 14–18)
MAGNESIUM SERPL-MCNC: 1.8 MG/DL (ref 1.6–2.6)
MCH RBC QN AUTO: 27.8 PG (ref 27–31)
MCHC RBC AUTO-ENTMCNC: 32.5 G/DL (ref 31.4–37.4)
MCV RBC AUTO: 86 FL (ref 82–98)
PHOSPHATE SERPL-MCNC: 2.7 MG/DL (ref 2.7–4.5)
PLATELET # BLD AUTO: 134 THOUSANDS/UL (ref 130–400)
PMV BLD AUTO: 7.8 FL (ref 8.9–12.7)
POTASSIUM SERPL-SCNC: 3.6 MMOL/L (ref 3.5–5.3)
RBC # BLD AUTO: 3.73 MILLION/UL (ref 4.7–6.1)
SODIUM SERPL-SCNC: 146 MMOL/L (ref 136–145)
WBC # BLD AUTO: 7.1 THOUSAND/UL (ref 4.8–10.8)

## 2017-11-15 PROCEDURE — 80048 BASIC METABOLIC PNL TOTAL CA: CPT | Performed by: NURSE PRACTITIONER

## 2017-11-15 PROCEDURE — 83735 ASSAY OF MAGNESIUM: CPT | Performed by: FAMILY MEDICINE

## 2017-11-15 PROCEDURE — 84100 ASSAY OF PHOSPHORUS: CPT | Performed by: FAMILY MEDICINE

## 2017-11-15 PROCEDURE — 94760 N-INVAS EAR/PLS OXIMETRY 1: CPT

## 2017-11-15 PROCEDURE — 92526 ORAL FUNCTION THERAPY: CPT

## 2017-11-15 PROCEDURE — 93005 ELECTROCARDIOGRAM TRACING: CPT | Performed by: FAMILY MEDICINE

## 2017-11-15 PROCEDURE — 97110 THERAPEUTIC EXERCISES: CPT

## 2017-11-15 PROCEDURE — 71010 HB CHEST X-RAY 1 VIEW FRONTAL (PORTABLE): CPT

## 2017-11-15 PROCEDURE — 85027 COMPLETE CBC AUTOMATED: CPT | Performed by: NURSE PRACTITIONER

## 2017-11-15 PROCEDURE — 94640 AIRWAY INHALATION TREATMENT: CPT

## 2017-11-15 PROCEDURE — 94669 MECHANICAL CHEST WALL OSCILL: CPT

## 2017-11-15 RX ORDER — POLYETHYLENE GLYCOL 3350 17 G/17G
17 POWDER, FOR SOLUTION ORAL DAILY
Status: DISCONTINUED | OUTPATIENT
Start: 2017-11-15 | End: 2017-11-28 | Stop reason: HOSPADM

## 2017-11-15 RX ORDER — POTASSIUM CHLORIDE 14.9 MG/ML
20 INJECTION INTRAVENOUS ONCE
Status: COMPLETED | OUTPATIENT
Start: 2017-11-15 | End: 2017-11-15

## 2017-11-15 RX ORDER — POTASSIUM CHLORIDE 29.8 MG/ML
40 INJECTION INTRAVENOUS ONCE
Status: DISCONTINUED | OUTPATIENT
Start: 2017-11-15 | End: 2017-11-15 | Stop reason: CLARIF

## 2017-11-15 RX ORDER — LORAZEPAM 2 MG/ML
1 INJECTION INTRAMUSCULAR EVERY 4 HOURS PRN
Status: DISCONTINUED | OUTPATIENT
Start: 2017-11-15 | End: 2017-11-15

## 2017-11-15 RX ORDER — LORAZEPAM 2 MG/ML
1 INJECTION INTRAMUSCULAR EVERY 4 HOURS PRN
Status: DISCONTINUED | OUTPATIENT
Start: 2017-11-15 | End: 2017-11-28 | Stop reason: HOSPADM

## 2017-11-15 RX ORDER — MAGNESIUM SULFATE HEPTAHYDRATE 40 MG/ML
2 INJECTION, SOLUTION INTRAVENOUS ONCE
Status: COMPLETED | OUTPATIENT
Start: 2017-11-15 | End: 2017-11-15

## 2017-11-15 RX ORDER — DIVALPROEX SODIUM 250 MG/1
250 TABLET, DELAYED RELEASE ORAL
Status: DISCONTINUED | OUTPATIENT
Start: 2017-11-15 | End: 2017-11-15

## 2017-11-15 RX ORDER — QUETIAPINE FUMARATE 100 MG/1
100 TABLET, FILM COATED ORAL
Status: DISCONTINUED | OUTPATIENT
Start: 2017-11-15 | End: 2017-11-15

## 2017-11-15 RX ORDER — DIVALPROEX SODIUM 250 MG/1
250 TABLET, DELAYED RELEASE ORAL
Status: DISCONTINUED | OUTPATIENT
Start: 2017-11-15 | End: 2017-11-27

## 2017-11-15 RX ORDER — QUETIAPINE FUMARATE 100 MG/1
100 TABLET, FILM COATED ORAL
Status: DISCONTINUED | OUTPATIENT
Start: 2017-11-15 | End: 2017-11-28 | Stop reason: HOSPADM

## 2017-11-15 RX ADMIN — DIVALPROEX SODIUM 750 MG: 250 TABLET, DELAYED RELEASE ORAL at 14:39

## 2017-11-15 RX ADMIN — ESCITALOPRAM OXALATE 20 MG: 10 TABLET ORAL at 09:48

## 2017-11-15 RX ADMIN — VALPROIC ACID 750 MG: 250 SOLUTION ORAL at 09:47

## 2017-11-15 RX ADMIN — IPRATROPIUM BROMIDE AND ALBUTEROL SULFATE 3 ML: .5; 3 SOLUTION RESPIRATORY (INHALATION) at 21:51

## 2017-11-15 RX ADMIN — AMITRIPTYLINE HYDROCHLORIDE 25 MG: 10 TABLET, FILM COATED ORAL at 22:53

## 2017-11-15 RX ADMIN — DIVALPROEX SODIUM 250 MG: 250 TABLET, DELAYED RELEASE ORAL at 23:01

## 2017-11-15 RX ADMIN — CHLORHEXIDINE GLUCONATE 15 ML: 1.2 RINSE ORAL at 09:47

## 2017-11-15 RX ADMIN — ASPIRIN 81 MG 81 MG: 81 TABLET ORAL at 09:48

## 2017-11-15 RX ADMIN — LORAZEPAM 1 MG: 2 INJECTION INTRAMUSCULAR; INTRAVENOUS at 12:16

## 2017-11-15 RX ADMIN — POTASSIUM CHLORIDE 20 MEQ: 200 INJECTION, SOLUTION INTRAVENOUS at 07:10

## 2017-11-15 RX ADMIN — ARIPIPRAZOLE 30 MG: 10 TABLET ORAL at 09:50

## 2017-11-15 RX ADMIN — POTASSIUM CHLORIDE 20 MEQ: 200 INJECTION, SOLUTION INTRAVENOUS at 09:49

## 2017-11-15 RX ADMIN — ACETYLCYSTEINE 600 MG: 200 SOLUTION ORAL; RESPIRATORY (INHALATION) at 07:17

## 2017-11-15 RX ADMIN — ACETYLCYSTEINE 600 MG: 200 SOLUTION ORAL; RESPIRATORY (INHALATION) at 02:22

## 2017-11-15 RX ADMIN — AMPICILLIN SODIUM AND SULBACTAM SODIUM 1.5 G: 1; .5 INJECTION, POWDER, FOR SOLUTION INTRAMUSCULAR; INTRAVENOUS at 18:27

## 2017-11-15 RX ADMIN — AMPICILLIN SODIUM AND SULBACTAM SODIUM 1.5 G: 1; .5 INJECTION, POWDER, FOR SOLUTION INTRAMUSCULAR; INTRAVENOUS at 12:06

## 2017-11-15 RX ADMIN — IPRATROPIUM BROMIDE AND ALBUTEROL SULFATE 3 ML: .5; 3 SOLUTION RESPIRATORY (INHALATION) at 02:22

## 2017-11-15 RX ADMIN — LORAZEPAM 1 MG: 2 INJECTION INTRAMUSCULAR; INTRAVENOUS at 17:07

## 2017-11-15 RX ADMIN — IPRATROPIUM BROMIDE AND ALBUTEROL SULFATE 3 ML: .5; 3 SOLUTION RESPIRATORY (INHALATION) at 13:35

## 2017-11-15 RX ADMIN — ACETYLCYSTEINE 600 MG: 200 SOLUTION ORAL; RESPIRATORY (INHALATION) at 21:51

## 2017-11-15 RX ADMIN — ENOXAPARIN SODIUM 40 MG: 40 INJECTION SUBCUTANEOUS at 09:48

## 2017-11-15 RX ADMIN — CHLORHEXIDINE GLUCONATE 15 ML: 1.2 RINSE ORAL at 22:54

## 2017-11-15 RX ADMIN — SODIUM CHLORIDE 3 G: 9 INJECTION, SOLUTION INTRAVENOUS at 05:49

## 2017-11-15 RX ADMIN — ACETYLCYSTEINE 600 MG: 200 SOLUTION ORAL; RESPIRATORY (INHALATION) at 13:35

## 2017-11-15 RX ADMIN — POLYETHYLENE GLYCOL 3350 17 G: 17 POWDER, FOR SOLUTION ORAL at 12:06

## 2017-11-15 RX ADMIN — IPRATROPIUM BROMIDE AND ALBUTEROL SULFATE 3 ML: .5; 3 SOLUTION RESPIRATORY (INHALATION) at 07:17

## 2017-11-15 RX ADMIN — ATORVASTATIN CALCIUM 20 MG: 20 TABLET, FILM COATED ORAL at 22:53

## 2017-11-15 RX ADMIN — Medication 1 TABLET: at 22:54

## 2017-11-15 RX ADMIN — MAGNESIUM SULFATE HEPTAHYDRATE 2 G: 40 INJECTION, SOLUTION INTRAVENOUS at 09:49

## 2017-11-15 RX ADMIN — QUETIAPINE FUMARATE 100 MG: 100 TABLET ORAL at 23:01

## 2017-11-15 NOTE — PROCEDURES
Bronchoscopy  Date/Time: 11/15/2017 1:14 PM  Performed by: Duong Shaw by: Cornell Goodman     Patient location:  Bedside  Other Assisting Provider: No    Consent:     Consent obtained:  Verbal    Consent given by:  Padmini Enciso protocol:     Procedure explained and questions answered to patient or proxy's satisfaction: yes      Immediately prior to procedure a time out was called: yes    Indications:     Procedure Purpose: therapeutic      Indications: pneumonia/infiltrate    Sedation:     Sedation type:  Per anesthesia  Upper Airway:     Oropharnyx: normal      Epiglottis: normal      Vocal cords movement: normal      Trachea: normal      Nona:  Normal  Airway:     Airway:  Minimal thick secretions suctioned bilaterally  Procedure details:     Description:  Bronchoscope introduced via oropharynx  Patient had little to no gag reflex  Lidocaine used to anesthetize the vocal cords  Bronchoscope was then passed through the vocal cords into the trachea without difficulty  Both right and left bronchial trees were examined and suctioned  There was minimal thick secretions suctioned bilaterally  No endobronchial lesions  No erythema  Procedures performed:     Lavage site:  Left lower lobe  Post-procedure details:     Chest x-ray performed: yes      Chest x-ray findings: Improved aeration  Patient tolerance of procedure:   Tolerated well, no immediate complications    Incomplete procedure: No    Final Diagnosis/Findings:      Mucous plugging resulting in left lung collapse

## 2017-11-15 NOTE — CONSULTS
Consultation - Regina Lawson 61 y o  male MRN: [id number]    Unit/Bed#: ICU 01 Encounter: [id number]      Identifying Data:  61years old white male is admitted at [facility] on November 8, 2017 with complaining of frequent falls psychiatric consultation is asked for severe depression agitations psychosis  Patient examined spoke with the nurse discussed with the nurse history physical emergency room record reviewed and noted spoke to Dr couch information obtained reportedly patient is Saurabh Schmid was the independently ambulatory but recently patient started having the ambulatory problems and he had a frequent falls and even head injuries he was referred to [facility] and CT scan was unremarkable then patient was referred here and he was admitted for further evaluation and treatment nurse reports that patient has been pulling out his IVs he pulled out his trach tube and he is unmanageable he needs something p r n  stronger than his routine medications to control his behavior  All the information obtained from the medical record and talking to the nurse patient is retarded no meaningful communications patient is not capable to provide any background medical history  Patient has been on psychotropic medications for the behavior and he is suffering from mental retardation psychosis high cholesterol all still arthritis and distant leaf frequent falls resulting in head injury ambulatory dysfunction  Currently patient is on lots of psychotropic medications Abilify 30 mg daily Seroquel 25 mg at bedtime Lexapro 20 mg daily Elavil 25 mg HS Depakote 750 mg b i d  and 250 mg in the morning  Chief Complaints:  Frequent fall and severe agitation  Family History: History reviewed  No pertinent family history  Not available  Legal History:  Not known      Mental Status Exam:  61years old white male is confuse retarded lying in the bed restless combative with the staff while doing the IV again patient needs to be hold by other staff to insert the IV unable to redirect him  Low IQ memory impaired possibly hallucinating psychotic paranoid and delusional poor concentration insight and judgments are impaired patient is severely mentally limited due to mental retardation patient is suffering from dementia and psychosis  Patient needs psychotropic medications      History of Present Illness     HPI: Elisa Fitzgerald is a 61y o  year old male who presents with frequent falls and agitation  Consults      Historical Information   Past Psychiatric History: This patient is a resident of [address] [place] [address] he is suffering from mental retardation and psychosis he has been on lots of psychotropic medications as I mentioned above patient has been under psychiatric care other detail history is not available regarding drug and alcohol abuse history family history social history etc currently patient has been on lots of psychotropic medications for the behavior control  Past Medical History:   Diagnosis Date    Deviated nasal septum     Eczema     Gastritis     Hyperlipidemia     Impulse control disorder     Insomnia     Mental retardation     Osteoarthritis     Psychiatric disorder     atypical psychosis, impulse control disorder    Ptosis of both eyelids     r > l    Scoliosis      Past Surgical History:   Procedure Laterality Date    OR [address] INCL FLUOR GDNCE DX W/CELL WASHG SPX N/A 11/14/2017    Procedure: BRONCHOSCOPY FLEXIBLE;  Surgeon: Donavon Davis MD;  [facility];   Service: Pulmonary     Social History   History   Alcohol Use No     History   Drug Use No     History   Smoking Status    Never Smoker   Smokeless Tobacco    Never Used       Meds/Allergies   current meds:   Current Facility-Administered Medications   Medication Dose Route Frequency    acetylcysteine (MUCOMYST) 200 mg/mL inhalation solution 600 mg  3 mL Nebulization Q6H    amitriptyline (ELAVIL) tablet 25 mg  25 mg Oral HS    ampicillin-sulbactam (UNASYN) 1 5 g in sodium chloride 0 9 % 50 mL IVPB  1 5 g Intravenous Q6H    ARIPiprazole (ABILIFY) tablet 30 mg  30 mg Oral Daily    aspirin chewable tablet 81 mg  81 mg Oral Daily    atorvastatin (LIPITOR) tablet 20 mg  20 mg Oral HS    chlorhexidine (PERIDEX) 0 12 % oral rinse 15 mL  15 mL Swish & Spit Q12H [address]    divalproex sodium (DEPAKOTE) EC tablet 250 mg  250 mg Oral HS    [START ON 11/16/2017] divalproex sodium (DEPAKOTE) EC tablet 750 mg  750 mg Oral QAM    divalproex sodium (DEPAKOTE) EC tablet 750 mg  750 mg Oral After Lunch    enoxaparin (LOVENOX) subcutaneous injection 40 mg  40 mg Subcutaneous Q24H SHERICE    escitalopram (LEXAPRO) tablet 20 mg  20 mg Oral Daily    ipratropium-albuterol (DUO-NEB) 0 5-2 5 mg/mL inhalation solution 3 mL  3 mL Nebulization Q6H    polyethylene glycol (MIRALAX) packet 17 g  17 g Oral Daily    QUEtiapine (SEROquel) tablet 25 mg  25 mg Oral HS    senna-docusate sodium (SENOKOT S) 8 6-50 mg per tablet 1 tablet  1 tablet Oral HS    and PTA meds:    Prescriptions Prior to Admission   Medication    acetaminophen (TYLENOL) 325 mg tablet    ARIPiprazole (ABILIFY) 30 mg tablet    aspirin (ECOTRIN LOW STRENGTH) 81 mg EC tablet    atorvastatin (LIPITOR) 20 mg tablet    divalproex sodium (DEPAKOTE) 500 mg EC tablet    divalproex sodium (DEPAKOTE) 500 mg EC tablet    divalproex sodium (DEPAKOTE) 500 mg EC tablet    divalproex sodium (DEPAKOTE) 500 mg EC tablet    escitalopram (LEXAPRO) 20 mg tablet    Multiple Vitamins-Minerals (MULTIVITAMIN ADULT PO)    omeprazole (PriLOSEC) 20 mg delayed release capsule     Allergies   Allergen Reactions    Haldol [Haloperidol]     Navane [Thiothixene]     Thorazine [Chlorpromazine]        Objective   Vitals: Blood pressure 132/69, pulse 91, temperature 98 1 °F (36 7 °C), resp  rate (!) 28, height 5' 3" (1 6 m), weight 47 6 kg (104 lb 15 oz), SpO2 97 %        Routine Lab Results:   Admission on 11/08/2017   No results displayed because visit has over 200 results  Diagnosis:  Schizoaffective disorder  Anxiety  Mental retardation  Dementia with psychosis depression  Delirium    Plan:  Increase Seroquel to 100 mg at bedtime  Lexapro 20 mg daily  Elavil 25 mg HS  Abilify 30 mg daily  Depakote 250 daily in the morning  Depakote 750 mg b i d  Ativan 1 mg IV q 4 hours p r n  for anxiety agitation  I will follow up during the hospital stay  Discussed with Dr couch then ICU nurse and staff  Thank you very much      Marco A Delarosa MD

## 2017-11-15 NOTE — SOCIAL WORK
SW following to monitor needs and assist as needed  Pt was transferred to ICU earlier this week  Remains in ICU at this time  Pt is a resident of [facility]  Current plan is for pt to return to the [facility] center when medically stable  SW will follow to monitor progress and assist as needed

## 2017-11-15 NOTE — PROGRESS NOTES
Daily Progress Note - Critical Care/ Anca Cleveland 61 y o  male MRN: [id number]  Unit/Bed#: ICU 01 Encounter: [id number]    ______________________________________________________________________  Assessment:   Principal Problem:    Acute respiratory failure with hypoxia (HCC)  Active Problems:    Head injury    Mental retardation    Frequent falls    Atelectasis    UTI (urinary tract infection)  Resolved Problems:    * No resolved hospital problems  *    57-y/o male, from [facility], PMHx profound mental retardation, chronic dysphagia, admitted for frequent falls with head injury, h/o hypernatremia   ICU Rapid response called on general medical floor for acute respiratory distress due to hypoxic respiratory failure   Patient desatted into the 70s and required endotracheal intubation  Patient s/p extubation and was placed on  high-flow nasal cannula at 10 liters tolerating well with oxygen saturation in the high 90s  Patient chest x-ray yesterday shows complete opacification of the left side likely atelectasis 2/2 to mucus plugging  S/p bronchoscopy patient  Patient checks x-ray today improved  Patient is on room air tolerating well  Plan:    Neuro:   · Frequent falls with head injury: Patient previously able to ambulate  Neurology following  MRI on 11/10 no acute changes, intracranial hemorrhage or mass effect   Avoid long-acting as sedatives  · Chronic dysphagia: speech and swallow evaluation   Barium swallow study ordered   · Insomnia: patient currently on amitriptyline 25 mg q h s  Was started on Seroquel 25mg q hs and tolerated well  · Will consult psychiatry for management of antipsychotic medication and multiple psych meds  · Will order EKG   · Depression/mental delay:  Lexapro 20 mg daily, Abilify 30 mg daily,   · H/o seizure activity: patient on Depakote p o  750 mg q a m , afternoon , and 250 mg q h s     · Pain controlled with:  Tylenol 650 mg q 6 hours p r n  for mild pain  · Regulate sleep/wake cycle     CV:   · Systolic Murmur: order echocardiogram results show normal EF 60% with grade 1 diastolic dysfunction  · patient is on aspirin 81 mg daily  · HLD: Lipitor 20mg daily   · Rhythm: NSR  · Follow rhythm on telemetry     Pulm:   · Complete Left sided lung opacity on chest x-ray: resolved  2/2 to mucus plugging:S/p bronchoscopy  Chest x-ray today shows improvement although poor inspiratory effort  Bronchial culture and Gram stain 4+ polys, 2+ GPC in pairs, 1+ GPC in clusters, fungal culture pending, AFP culture was stain pending  · PNA 2/2 to possible aspiration: sputum growing 2+ Moraxella catarrhalis  unasyn 3g q 6 hr  Decreased to 1 5mg q6hr (Day # 2) complete 5 day course  · Respiratory protocol  chest PT, due-neb q 6hr, Duo-neb q6hr  · Hypoxic respiratory failure:  S/p extubation  Patient on room air tolerating well with oxygen saturation of 93-94%  · HOB >30 degrees: yes      GI:   · Nutrition/diet plan: puree with pudding thick liquids    · Protein calorie malnutrition: Will start diet as above   · Stress ulcer prophylaxis: none  · Bowel regimen:  Sennakot S, Miralax     FEN:   · Hypernatremia:  Resolved  · Fluid/Diuretic plan: No intervention  · Routine labs daily  · Electrolytes repleted: yes Mg sulfate IVPB 2 g, K 40 mg mEq IVPB   · Goal: K >4 0, Mag >2 0, and Phos >3 0     :   · UTI: Ucx >100,000 cfu E  Coli   · BUN and creatinine 4 and 0 15 this a m  within acceptable limits   Continue to monitor renal function  · Indwelling Lundberg discontinued today     ID:   · UTI: UA showed Moderate leuks and large blood   UCx >100K E  Coli  ·  Pneumonia: CXR chest x-ray improved although poor inspiratory effort  Sputum Cx 2+ Moraxella catarrhalis    · Abx ordered Cefepime 1gram q12 hr for 2 days which was discontinued and patient started on Unasyn 1g q 6hr day 2/5 day course for complete antibiotic course of 7 days    · WBC count remains normal, 7 1 this am  Patient is afebrile   · Trend temps and WBC count     Heme:   · H/H  stable   ·  goal hgb >7  · Continue to monitor with CBC daily     Endo:   · Patient is not diabetic and does not require ISS patient blood glucose has been well controlled during admission  · Glycemic control plan: Blood glucose controlled on current regimen     Msk/Skin:  · Frequent falls: patient has been able to ambulate prior to admission he has had frequent falls with head injury  · Mobility goals: ambulating with assistance  Patient needs to be up in chair with all meals and to stay in chair for at least 2 hours post meal   Patient will continue to need one-to-one observation as he is a risk to himself and medical treatment  · PT consult: yes  · OT consult: yes   · Frequent turning and off-loading     Family:  · Family updated within 24 hours: yes   · Family meeting planned today: no     Lines:  · Peripheral IV left hand and forearm 20 gauge    VTE Prophylaxis:  · Pharmacologic Prophylaxis: Enoxaparin (Lovenox)  · Mechanical Prophylaxis: sequential compression device    Disposition: Transfer to general medical floor    Code Status: Level 1 - Full Code    Counseling / Coordination of Care  Total time spent today 40 minutes  Greater than 50% of total time was spent with the patient and / or family counseling and / or coordination of care  A description of the counseling / coordination of care: See above assessment/plan and attending attestation  Patient is medically stable from a critical care standpoint to be transferred to general medical floor  ______________________________________________________________________    HPI/24hr events:  No events overnight  Patient has insomnia baseline Seroquel was added with patient tolerating well  Patient still continues to become agitated and pull at lines due to impulse control disorder  Patient is to or prior 1 1 observation    Unable to obtain review of systems due to patient intellectual disability     ______________________________________________________________________    Physical Exam:   Physical Exam   Constitutional: No distress  HENT:   Head: Normocephalic  Right forehead hematoma improved from previous exam   Eyes: Conjunctivae and EOM are normal  Pupils are equal, round, and reactive to light  No scleral icterus  Neck: Normal range of motion  Cardiovascular: Normal rate, regular rhythm and intact distal pulses  Exam reveals no gallop and no friction rub  Murmur (Systolic) heard  Pulmonary/Chest: Effort normal  No respiratory distress  He has no wheezes  He has no rales  He exhibits no tenderness  Shallow breaths slightly diminished on the left otherwise CTA bilateral   Abdominal: Soft  Bowel sounds are normal  He exhibits no distension and no mass  There is no tenderness  There is no rebound and no guarding  Musculoskeletal: He exhibits no edema or tenderness  Lymphadenopathy:     He has no cervical adenopathy  Neurological: He is alert  Skin: No rash noted  He is not diaphoretic  No erythema  No pallor  ______________________________________________________________________  Vitals:    11/15/17 0100 11/15/17 0200 11/15/17 0233 11/15/17 0341   BP: 121/60 117/65     Pulse: 65 71     Resp: (!) 23 (!) 26     Temp:    98 7 °F (37 1 °C)   TempSrc:    Temporal   SpO2:   92%    Weight:       Height:                  Temperature:   Temp (24hrs), Av 2 °F (36 8 °C), Min:97 7 °F (36 5 °C), Max:98 7 °F (37 1 °C)    Current Temperature: 98 7 °F (37 1 °C)    Weights:   IBW: 56 9 kg    Body mass index is 18 55 kg/m²    Weight (last 2 days)     Date/Time   Weight    17 0600  47 5 (104 72)              Hemodynamic Monitoring:  N/A       Non-Invasive/Invasive Ventilation Settings:  Respiratory    Lab Data (Last 4 hours)    None         O2/Vent Data (Last 4 hours)    None              No results found for: PHART, CDG4MYN, PO2ART, MFD2UMP, Q8NYTDZY, BEART, SOURCE  SpO2: SpO2: 97 %, SpO2 Activity: SpO2 Activity: At Rest, SpO2 Device: O2 Device: None (Room air), Capnography: Capnography: No    Intake and Outputs:  I/O       11/13 0701 - 11/14 0700 11/14 0701 - 11/15 0700    P  O   240    I V  (mL/kg) 1968 5 (41 4)     NG/GT 60     IV Piggyback 150     Total Intake(mL/kg) 2178 5 (45 9) 240 (5 1)    Urine (mL/kg/hr) 820 (0 7) 450 (0 4)    Emesis/NG output 150 (0 1)     Total Output 970 450    Net +1208 5 -210                  Nutrition:        Diet Orders            Start     Ordered    11/14/17 1404  Diet Dysphagia/Modified Consistency; Dysphagia 1-Pureed; Dysphagia 1-Pureed; Pudding Thick Liquid  Diet effective now     Question Answer Comment   Diet Type Dysphagia/Modified Consistency    Dysphagia/Modified Consistency Dysphagia 1-Pureed    Other Restriction(s): Dysphagia 1-Pureed    Liquid Modifier Pudding Thick Liquid    RD to adjust diet per protocol? Yes        11/14/17 1403    11/08/17 1752  Room Service  Once     Question:  Type of Service  Answer:  Room Service- Not Appropriate    11/08/17 1751          Labs:     Results from last 7 days  Lab Units 11/15/17  0424 11/14/17  0517 11/13/17  0553 11/12/17 2107  11/08/17  1422   WBC Thousand/uL 7 10 8 70 8 70 5 90  < > 11 10*   HEMOGLOBIN g/dL 10 4* 11 5* 11 5* 12 4*  < > 14 9   HEMATOCRIT % 32 0* 34 7* 35 1* 37 6*  < > 45 3   PLATELETS Thousands/uL 134 118* 143 150  < > 208   NEUTROS PCT %  --  84*  --  82*  --  85*   MONOS PCT %  --  7  --  8  --  7   MONO PCT MAN %  --   --  9  --   --   --    < > = values in this interval not displayed      Results from last 7 days  Lab Units 11/15/17  0424 11/14/17 0517 11/13/17  0553  11/08/17  1422   SODIUM mmol/L 146* 144 143  < > 150*   POTASSIUM mmol/L 3 6 3 7 3 8  < > 4 4   CHLORIDE mmol/L 111* 110* 108  < > 109*   CO2 mmol/L 29 26 26  < > 28   BUN mg/dL 4* 7 12  < > 18   CREATININE mg/dL <0 15* 0 22* 0 20*  < > 0 30*   CALCIUM mg/dL 8 2* 8 3 8 2*  < > 9 4   TOTAL PROTEIN g/dL  --   -- --   --  7 8   BILIRUBIN TOTAL mg/dL  --   --   --   --  0 40   ALK PHOS U/L  --   --   --   --  131*   ALT U/L  --   --   --   --  51   AST U/L  --   --   --   --  88*   GLUCOSE RANDOM mg/dL 79 95 102  < > 104   < > = values in this interval not displayed  Results from last 7 days  Lab Units 11/14/17  0517 11/13/17  0553 11/12/17  2107   MAGNESIUM mg/dL 2 3 1 9 2 0     Lab Results   Component Value Date    PHOS 3 2 11/14/2017    PHOS 3 2 11/13/2017    PHOS 3 6 11/12/2017          No results found for: TROPONINI    Results from last 7 days  Lab Units 11/12/17  2107   LACTIC ACID mmol/L 1 4     ABG:  Lab Results   Component Value Date    PHART 7 452 (H) 11/12/2017    KTM2CCM 33 9 (L) 11/12/2017    PO2ART 276 1 (H) 11/12/2017    UEL9UQZ 23 1 11/12/2017    BEART -0 3 11/12/2017    SOURCE Radial, Left 11/12/2017       Imaging:   Xr Skull < 4 Vw    Result Date: 11/10/2017  Impression: No radiopaque foreign body  Workstation performed: [id number]     Xr Chest Portable    Result Date: 11/13/2017  Impression: ET tube tip above the hong  Workstation performed: [id number]     Xr Chest Portable    Result Date: 11/12/2017  Impression: No active pulmonary disease  Workstation performed: [id number]     Xr Chest 1 View Portable    Result Date: 11/9/2017  Impression: Diminished inspiration  No active pulmonary disease  Workstation performed: [id number]     Mri Brain W Wo Contrast    Result Date: 11/10/2017  Impression: 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 No focal brain parenchymal abnormalities No acute intracranial hemorrhage or mass effect No pathologic enhancement    Workstation performed: [id number]     Xr Chest Portable Icu    Result Date: 11/13/2017  Impression: 1  Evolving left lower lobe infiltrate representing atelectasis or pneumonia  2   Low-lying endotracheal tube with tip projecting towards the left mainstem bronchus  Repositioning is recommended   3  Malpositioned orogastric tube with tip projecting into the EG junction  Repositioning recommended  ##sigslh##sigslh Workstation performed: [id number]      Micro:  Lab Results   Component Value Date    BLOODCX No Growth at 24 hrs  11/13/2017    BLOODCX No Growth at 24 hrs  11/13/2017    URINECX Culture results to follow  11/13/2017    URINECX >100,000 cfu/ml Enterococcus species (A) 11/12/2017    SPUTUMCULTUR 2+ Growth of Moraxella catarrhalis (A) 11/12/2017    SPUTUMCULTUR 3+ Growth of  11/12/2017       Allergies: Allergies   Allergen Reactions    Haldol [Haloperidol]     Navane [Thiothixene]     Thorazine [Chlorpromazine]      Medications:   Scheduled Meds:  acetylcysteine 3 mL Nebulization Q6H   amitriptyline 25 mg Oral HS   ampicillin-sulbactam 3 g Intravenous Q6H   ARIPiprazole 30 mg Oral Daily   aspirin 81 mg Oral Daily   atorvastatin 20 mg Oral HS   chlorhexidine 15 mL Swish & Spit Q12H SHERICE   enoxaparin 40 mg Subcutaneous Q24H SHERICE   escitalopram 20 mg Oral Daily   ipratropium-albuterol 3 mL Nebulization Q6H   potassium chloride 20 mEq Intravenous Once   Followed by      potassium chloride 20 mEq Intravenous Once   QUEtiapine 25 mg Oral HS   senna-docusate sodium 1 tablet Oral HS   Valproic Acid 250 mg Oral HS   Valproic Acid 750 mg Oral QAM   Valproic Acid 750 mg Oral Daily With Lunch     Continuous Infusions:   PRN Meds:       Invasive lines and devices:   Invasive Devices     Peripheral Intravenous Line            Peripheral IV 11/12/17 Left Forearm 2 days    Peripheral IV 11/12/17 Left Hand 2 days          Drain            Urethral Catheter 16 Fr  2 days                   SIGNATURE: Ce Hanson MD  DATE: November 15, 2017

## 2017-11-15 NOTE — PROGRESS NOTES
Transfer Note - ICU/Stepdown Transfer to [facility]/MS tele   Rod Antonio 61 y o  male MRN: [id number]  [address] [facility]   Unit/Bed#: ICU 01 Encounter: [id number]    Code Status: Level 1 - Full Code    Reason for ICU/Stepdown admission: Acute hypoxic respiratory failure     Active problems: Principal Problem:    Acute respiratory failure with hypoxia ([address] )  Active Problems:    Head injury    Mental retardation    Frequent falls    Atelectasis    UTI (urinary tract infection)  Resolved Problems:    * No resolved hospital problems  *      1  Acute respiratory failure with hypoxia:  Called to patient's room on General Medical Floor for rapid response due to respiratory distress  Patient was endotracheally intubated at bedside  Patient was transferred down to the ICU  Decompensation was likely 2/2 to aspiration  No seizure activity was noted at that time  Patient was placed on DuoNeb q 6 hours scheduled with chest physiotherapy  On day 2 of ICU stay plan was to extubate patient  While in restraints patient was able to self extubate  ICU team was immediately notified and at bedside  Patient was placed on high-flow nasal cannula at 10 L and tolerated well  Patient was weaned to nasal cannula and then to room air  On day 4 of ICU stay and discharged to general medical floor patient was tolerating room air at 94% oxygen saturation  2  Atelectasis:  Day 3 of ICU stay patient had acute hypoxic desaturation  Patient was paced on non-rebreather and aggressive suctioning was performed with improvement of saturation  chest x-ray showed complete opacification of the left lung field  Bedside ultrasound was performed which did not show pleural fluid likely atelectasis 2/2 to mucus plugging  Bronchoscopy was scheduled and performed at bedside  Both right and left bronchial trees were examined and suctioned  There was minimal thick secretions suctioned bilaterally  No endobronchial lesions    No erythema  Lavage samples were taken of the left lower lobe Patient tolerated well   Postop chest x-ray showed improvement in aeration  Patient was started on Mucomyst q 6 hours with DuoNeb administration and suctioning with the each Respiratory therapy session  Next day chest x-ray showed improvement in the left lung field  3  Aspiration pneumonia:   Sputum culture was taken and culture showed 2+ growth of Moraxella catarrhalis  Patient was originally started on cefepime empirically for aspiration  Patient completed 2 days  Culture showed more axilla catarrhalis was beta lactamase producing   Patient was switched to Unasyn 1 5 g q 6 hours for a 5 day course  For a total antibiotic course of 7 days  Aspiration precautions  One-to-one observation  4  UTI:  Urine culture was obtained showing > 100,000 colony-forming units of Enterococcus species  Patient was started on Unasyn as above  5  Constipation:  Patient placed on Colace and MiraLax  6  Frequent falls:  Hematoma of right forehead  Was stable during his ICU admission  Patient was placed on fall precautions  MRI was negative for any acute process  There is no concern for seizure  His Depakote and ammonia levels were within acceptable range  May be related to post concussion syndrome  Patient was started on Elavil  Patient will need PT for balance if possible at [facility]  7  Severe intellectual delay:  Patient required one-to-one observation and soft restraints  8  Chronic dysphagia:  Patient was scheduled for barium swallow with video  Patient would not tolerate sitting still for barium swallow 2/2 to intellectual disability so conservative management for diet  Most conservative Dysphagia with pudding thick liquids  S patient is aspiration risk he should be up and in chair with all meals    And in chair at least 2 hours post meal   9  Systolic murmur:  Echocardiogram performed showing normal EF of 60% and grade 1 diastolic dysfunction    Consultants:   · Pulmonology    History of Present Illness/Summary of clinical course:    HPI per admitting team:     Kenroy Ortiz is a 61 y o  male who presents with frequent falls  Patient is a resident of [facility] mentally retarded  Information is obtained from the records from [facility] and also from the physician who works there  Also information obtained from the emergency room physician and the records  Patient usually is independent ambulatory with no unsteadiness with his gait until it became apparent november 5th 2017  Patient had 3 head injuries on October 29th again on low blood 4th with left periorbital ecchymosis and again on no more 7 patient had an injury to the right forehead area with hematoma formation    Patient was sent to [facility] 2 times very had a CT scan done which came back unremarkable however there is a concern with his frequent falls and head injuries patient has been sent here for further evaluation and treatment  Patient has been evaluated in the emergency room and subsequently got admitted for further evaluation and treatment  There is no documentation of vomiting  In the emergency room patient has been very restless  ICU rapid Response:   Kenroy Ortiz is a 61y o  year old male who presents to the hospital on to 11/8/2017 with frequent falls and head trauma  He is being evaluated for worsening encephalopathy as well as dysphagia  A rapid response was called when the patient was found to be minimally responsive and hypoxic  On arrival the patient was hypoxic in the 70s and minimally responsive  He was emergently intubated at bedside  During the intubation a large amount of clear secretions were present in the oropharynx and were also suctioned from the lungs  The patient was transferred to the intensive care unit  The patient's brother was notified of his change in status   Dr Joseph Villegas was present for the rapid response  ICU clinical course see above  Please refer to today's progress note for further clinical details  Recent or scheduled procedures:   Bronchoscopy:  No complications  Xr Skull < 4 Vw    Result Date: 11/10/2017  Impression: No radiopaque foreign body  Workstation performed: [id number]     Xr Chest Portable    Result Date: 11/13/2017  Impression: ET tube tip above the hong  Workstation performed: [id number]     Xr Chest Portable    Result Date: 11/12/2017  Impression: No active pulmonary disease  Workstation performed: [id number]     Xr Chest 1 View Portable    Result Date: 11/9/2017  Impression: Diminished inspiration  No active pulmonary disease  Workstation performed: [id number]     Mri Brain W Wo Contrast    Result Date: 11/10/2017  Impression: 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 No focal brain parenchymal abnormalities No acute intracranial hemorrhage or mass effect No pathologic enhancement    Workstation performed: [id number]     Xr Chest Portable Icu    Result Date: 11/13/2017  Impression: 1  Evolving left lower lobe infiltrate representing atelectasis or pneumonia  2   Low-lying endotracheal tube with tip projecting towards the left mainstem bronchus  Repositioning is recommended  3   Malpositioned orogastric tube with tip projecting into the EG junction  Repositioning recommended  ##sigslh##sigslh Workstation performed: [id number]     Outstanding/pending diagnostics:   Bronchial Cx and gram stain: Cx pending, gram stain 4+ polys, 2+ GPC in pairs, GPC in clusters  AFB culture:  Pending  Urine culture:  Pending 11/14/17  Blood culture x2:  No growth at 48 hours final report pending  Sputum culture and Gram stain 2+ growth of Moraxella catarrhalis  Urine culture: Enterococcus species final report pending   MRSA negative   Mobilization Plan:   Up in chair with all meals    Patient to stay in chair for at least 2 hours post meal   PTOT consult    Nutrition Plan:  Diet dysphagia/modified consistency; dysphagia 1 pureed; dysphagia 1 pureed; pudding thick liquid    Discharge Plan:    Discharge from ICU to general medical floor  Hospital discharge per primary team     Specific Diagnosis Plan:        [x  ] Family aware of transfer out of critical care: yes   [  ] Blanca Treadwell:        Spoke with Dr Jens Tesfaye regarding transfer @ 1130am  Patient accepted to their service      Darold Kawasaki, MD

## 2017-11-15 NOTE — SPEECH THERAPY NOTE
SLP  Swallow Therapy Note    S:  Patient alert, OOB in chair  No pain evident  RN and aide repositioning patient in chair as he is restless and in constant motion  O:  Dysphagia 1/ Puree diet and Pudding Thick liquids received  Patient ate well at breakfast when fed, consumed pureed Belgian toast, pureed peaches, and pureed banana  Aide reported patient was not coughing while eating  Reassessed patient with puree:  Adequate oral reception and containment  Preparation, bolus formation, transit, and swallow trigger were timely  No overt s/s of aspiration seen post all swallows of pureed solids and pudding thick liquids  Patient with baseline congested cough noted  A:  Oral pharyngeal swallow skills are safe/ [facility] for pureed solids and pudding thick liquids  P:  Recommend:  1  Continue Dysphagia 1/ Puree diet and Pudding Thick liquids  2   Medications crushed in a puree bolus  3   Feed all meals as fully alert and upright  4   Maintain strict aspiration and reflux precautions  D/w patient's RN, aide, and MD   Will continue to follow safe swallow skills and possible diet/ liquid upgrade

## 2017-11-15 NOTE — PHYSICAL THERAPY NOTE
PT TREATMENT     11/15/17 1510   Pain Assessment   Pain Assessment FLACC   Pain Score No Pain   Restrictions/Precautions   Other Precautions Impulsive;1:1;Chair Alarm; Bed Alarm; Fall Risk;Pain;Multiple lines;Telemetry   General   Chart Reviewed Yes   Cognition   Overall Cognitive Status Impaired   Arousal/Participation Arousable   Attention Difficulty attending to directions   Following Commands Unable to follow one step commands   Subjective   Subjective no verbalization   Bed Mobility   Supine to Sit 3  Moderate assistance   Sit to Supine 3  Moderate assistance   Additional items Assist x 2   Transfers   Sit to Stand 3  Moderate assistance   Additional items Assist x 2   Stand to Sit 3  Moderate assistance   Additional items Assist x 2   Balance   Static Sitting Fair   Dynamic Sitting Poor   Static Standing Poor   Dynamic Standing Poor   Assessment   Prognosis Good   Problem List Decreased strength;Decreased endurance; Impaired balance;Decreased mobility; Decreased cognition; Impaired judgement;Decreased safety awareness   Assessment Pt able to participate minimally in therapy today  Pt was able to stand and tried to take steps forward  Pt did not follow commands to "just stand"  so pt returned to bed  It is not safe to ambulate with pt at this time dues to multiple lines and wires in ICU  Plan   Treatment/Interventions Functional transfer training; Therapeutic exercise;Patient/family training;Cognitive reorientation;Equipment eval/education;Gait training;Bed mobility   Progress Progressing toward goals   Recommendation   Recommendation (return to [facility])

## 2017-11-15 NOTE — PROGRESS NOTES
Progress Note - Angelica Gutierres 61 y o  male MRN: [id number]    Unit/Bed#: [address] [place] [address] Encounter: [id number]        Subjective:   Chart reviewed patient evaluated discussed with the ICU staff  Patient was transferred to the intensive care unit after he had respiratory distress got intubated  Patient was started on IV antibiotic thinking in terms of aspiration and subsequently patient self-extubated  Patient has been started back on his diet IV antibiotics are being continued  Patient was also seen by the psychiatrist to review his psychotropic medications  Currently patient is alert has some secretions in the throat  Review of Systems    Objective:     Vitals: Blood pressure 122/63, pulse 98, temperature 98 2 °F (36 8 °C), temperature source Tympanic, resp  rate (!) 30, height 5' 3" (1 6 m), weight 47 6 kg (104 lb 15 oz), SpO2 100 %  ,Body mass index is 18 59 kg/m²        Intake/Output Summary (Last 24 hours) at 11/15/17 1653  Last data filed at 11/15/17 1601   Gross per 24 hour   Intake              200 ml   Output             2500 ml   Net            -2300 ml         Current Facility-Administered Medications:     acetylcysteine (MUCOMYST) 200 mg/mL inhalation solution 600 mg, 3 mL, Nebulization, Q6H, Lulu Delatorre PA-C, 600 mg at 11/15/17 1335    amitriptyline (ELAVIL) tablet 25 mg, 25 mg, Oral, HS, Salvatore Coombs MD, 25 mg at 11/14/17 2141    ampicillin-sulbactam (UNASYN) 1 5 g in sodium chloride 0 9 % 50 mL IVPB, 1 5 g, Intravenous, Q6H, Andree Herrera MD, Stopped at 11/15/17 1330    ARIPiprazole (ABILIFY) tablet 30 mg, 30 mg, Oral, Daily, Johnathon Balderas MD, 30 mg at 11/15/17 0950    aspirin chewable tablet 81 mg, 81 mg, Oral, Daily, Luisa Olivera PA-C, 81 mg at 11/15/17 0948    atorvastatin (LIPITOR) tablet 20 mg, 20 mg, Oral, HS, Johnathon Balderas MD, 20 mg at 11/14/17 2141    chlorhexidine (PERIDEX) 0 12 % oral rinse 15 mL, 15 mL, Swish & Spit, Q12H [address], Luisa Olivera PA-C, 15 mL at 11/15/17 0947    [MAR Hold] divalproex sodium (DEPAKOTE) EC tablet 250 mg, 250 mg, Oral, HS, Tawanda Phelps MD    [facility] Hold] divalproex sodium (DEPAKOTE) EC tablet 750 mg, 750 mg, Oral, QAM, Tawanda Phelps MD    [facility] Hold] divalproex sodium (DEPAKOTE) EC tablet 750 mg, 750 mg, Oral, After Lunch, Tawanda Phelps MD, 750 mg at 11/15/17 1439    enoxaparin (LOVENOX) subcutaneous injection 40 mg, 40 mg, Subcutaneous, Q24H [address], Tawanda Phelps MD, 40 mg at 11/15/17 0948    escitalopram (LEXAPRO) tablet 20 mg, 20 mg, Oral, Daily, Johnathon Balderas MD, 20 mg at 11/15/17 0948    ipratropium-albuterol (DUO-NEB) 0 5-2 5 mg/mL inhalation solution 3 mL, 3 mL, Nebulization, Q6H, Aly Portillo PA-C, 3 mL at 11/15/17 1335    [MAR Hold] LORazepam (ATIVAN) 2 mg/mL injection 1 mg, 1 mg, Intravenous, Q4H PRN, Kalie Chavez MD, 1 mg at 11/15/17 1216    polyethylene glycol (MIRALAX) packet 17 g, 17 g, Oral, Daily, Tawanda Phelps MD, 17 g at 11/15/17 1206    [MAR Hold] QUEtiapine (SEROquel) tablet 100 mg, 100 mg, Oral, HS, Kalie Chavez MD    senna-docusate sodium (SENOKOT S) 8 6-50 mg per tablet 1 tablet, 1 tablet, Oral, HS, Tawanda Phelps MD, 1 tablet at 11/14/17 2141     Physical Exam   Constitutional:   Confused and disoriented periods of agitation   HENT:   Patient has ecchymotic areas on the forehead and also on the back of the head   Eyes: Conjunctivae are normal  Pupils are equal, round, and reactive to light  Neck: Neck supple  No thyromegaly present  Cardiovascular: Normal rate and regular rhythm  Pulmonary/Chest: No respiratory distress  He exhibits no tenderness  Transmitted sounds present   Abdominal: Soft  Bowel sounds are normal    Musculoskeletal: Normal range of motion  He exhibits no edema or deformity  Neurological: He is alert  Periods of agitation   Skin: Skin is warm and dry             Lab, Imaging and culture:     Recent Results (from the past 72 hour(s)) Blood gas, arterial    Collection Time: 11/12/17  8:48 PM   Result Value Ref Range    pH, Arterial 7 452 (H) 7 350 - 7 450    PH ART TC 7 447 7 350 - 7 450    pCO2, Arterial 33 9 (L) 36 0 - 44 0 mm Hg    PCO2 (TC) Arterial 34 3 (L) 36 0 - 44 0 mm Hg    pO2, Arterial 276 1 (H) 75 0 - 129 0 mm Hg    PO2 (TC) Arterial 277 5 (H) 75 0 - 129 0 mm Hg    HCO3, Arterial 23 1 22 0 - 28 0 mmol/L    Base Excess, Arterial -0 3 mmol/L    O2 Content, Arterial 16 9 16 0 - 23 0 mL/dL    O2 HGB,Arterial  98 6 (H) 94 0 - 97 0 %    SOURCE Radial, Left     JERE TEST Yes     Temperature 99 2 Degrees Fehrenheit    Vent Type- AC AC     AC Rate 14     Tidal Volume 450 ml    Inspired Air (FIO2) 80     PEEP +5    MRSA culture    Collection Time: 11/12/17  8:52 PM   Result Value Ref Range    MRSA Culture Only       No Methicillin Resistant Staphlyococcus aureus (MRSA) isolated   UA w Reflex to Microscopic    Collection Time: 11/12/17  8:53 PM   Result Value Ref Range    Color, UA Yellow     Clarity, UA Turbid     Specific Gravity, UA >=1 030 1 000 - 1 030    pH, UA 5 5 5 0 - 9 0    Leukocytes, UA Moderate (A) Negative    Nitrite, UA Negative Negative    Protein,  (2+) (A) Negative mg/dl    Glucose, UA Negative Negative mg/dl    Ketones, UA 15 (1+) (A) Negative mg/dl    Urobilinogen, UA 1 0 0 2, 1 0 E U /dl E U /dl    Bilirubin, UA Interference- unable to analyze (A) Negative    Blood, UA Large (A) Negative   Urine Microscopic    Collection Time: 11/12/17  8:53 PM   Result Value Ref Range    RBC, UA Field obscured, unable to enumerate (A) None Seen, 0-5 /hpf    WBC, UA Innumerable (A) None Seen, 0-5, 5-55, 5-65 /hpf    Epithelial Cells Field obscured, unable to enumerate (A) None Seen, Occasional /hpf    Bacteria, UA Field obscured, unable to enumerate (A) None Seen, Occasional /hpf   CBC and differential    Collection Time: 11/12/17  9:07 PM   Result Value Ref Range    WBC 5 90 4 80 - 10 80 Thousand/uL    RBC 4 37 (L) 4 70 - 6 10 Million/uL    Hemoglobin 12 4 (L) 14 0 - 18 0 g/dL    Hematocrit 37 6 (L) 42 0 - 52 0 %    MCV 86 82 - 98 fL    MCH 28 3 27 0 - 31 0 pg    MCHC 32 9 31 4 - 37 4 g/dL    RDW 15 3 (H) 11 6 - 15 1 %    MPV 7 4 (L) 8 9 - 12 7 fL    Platelets 854 842 - 837 Thousands/uL    nRBC 0 /100 WBCs    Neutrophils Relative 82 (H) 43 - 75 %    Lymphocytes Relative 8 (L) 14 - 44 %    Monocytes Relative 8 4 - 12 %    Eosinophils Relative 2 0 - 6 %    Basophils Relative 0 0 - 1 %    Neutrophils Absolute 4 90 1 85 - 7 62 Thousands/µL    Lymphocytes Absolute 0 50 (L) 0 60 - 4 47 Thousands/µL    Monocytes Absolute 0 50 0 17 - 1 22 Thousand/µL    Eosinophils Absolute 0 10 0 00 - 0 61 Thousand/µL    Basophils Absolute 0 00 0 00 - 0 10 Thousands/µL   Basic metabolic panel    Collection Time: 11/12/17  9:07 PM   Result Value Ref Range    Sodium 144 136 - 145 mmol/L    Potassium 4 2 3 5 - 5 3 mmol/L    Chloride 107 100 - 108 mmol/L    CO2 28 21 - 32 mmol/L    Anion Gap 9 4 - 13 mmol/L    BUN 13 5 - 25 mg/dL    Creatinine 0 30 (L) 0 60 - 1 30 mg/dL    Glucose 91 65 - 140 mg/dL    Calcium 8 3 8 3 - 10 1 mg/dL    eGFR 147 ml/min/1 73sq m   Magnesium    Collection Time: 11/12/17  9:07 PM   Result Value Ref Range    Magnesium 2 0 1 6 - 2 6 mg/dL   Phosphorus    Collection Time: 11/12/17  9:07 PM   Result Value Ref Range    Phosphorus 3 6 2 7 - 4 5 mg/dL   Lactic acid, plasma    Collection Time: 11/12/17  9:07 PM   Result Value Ref Range    LACTIC ACID 1 4 0 5 - 2 0 mmol/L   Ammonia    Collection Time: 11/12/17  9:07 PM   Result Value Ref Range    Ammonia 36 (H) 11 - 35 umol/L   Valproic acid level, total    Collection Time: 11/12/17  9:07 PM   Result Value Ref Range    Valproic Acid, Total 60 50 - 100 ug/mL   Sputum culture and Gram stain    Collection Time: 11/12/17  9:07 PM   Result Value Ref Range    Sputum Culture 2+ Growth of Moraxella catarrhalis (A)     Sputum Culture 3+ Growth of      Gram Stain Result 2+ Polys     Gram Stain Result 2+ Gram positive cocci in pairs     Gram Stain Result 1+ Gram positive rods     Gram Stain Result 1+ Gram negative rods        Susceptibility    Moraxella catarrhalis -  (no method available)     Beta Lactamase Positive     Urine culture    Collection Time: 11/12/17  9:07 PM   Result Value Ref Range    Urine Culture >100,000 cfu/ml Enterococcus faecalis (A)        Susceptibility    Enterococcus faecalis - [place]ON     Ampicillin ($$) <=2 00 Susceptible ug/ml     Levofloxacin ($) <=2 00 Susceptible ug/ml     Nitrofurantoin <=32 Susceptible ug/ml     Tetracycline <=4 Susceptible ug/ml     Vancomycin ($) 2 00 Susceptible ug/ml   CBC and differential    Collection Time: 11/13/17  5:53 AM   Result Value Ref Range    WBC 8 70 4 80 - 10 80 Thousand/uL    RBC 4 08 (L) 4 70 - 6 10 Million/uL    Hemoglobin 11 5 (L) 14 0 - 18 0 g/dL    Hematocrit 35 1 (L) 42 0 - 52 0 %    MCV 86 82 - 98 fL    MCH 28 2 27 0 - 31 0 pg    MCHC 32 7 31 4 - 37 4 g/dL    RDW 15 0 11 6 - 15 1 %    MPV 7 7 (L) 8 9 - 12 7 fL    Platelets 031 750 - 694 Thousands/uL    nRBC 0 /100 WBCs   Basic metabolic panel    Collection Time: 11/13/17  5:53 AM   Result Value Ref Range    Sodium 143 136 - 145 mmol/L    Potassium 3 8 3 5 - 5 3 mmol/L    Chloride 108 100 - 108 mmol/L    CO2 26 21 - 32 mmol/L    Anion Gap 9 4 - 13 mmol/L    BUN 12 5 - 25 mg/dL    Creatinine 0 20 (L) 0 60 - 1 30 mg/dL    Glucose 102 65 - 140 mg/dL    Calcium 8 2 (L) 8 3 - 10 1 mg/dL    eGFR 174 ml/min/1 73sq m   Blood culture    Collection Time: 11/13/17  5:53 AM   Result Value Ref Range    Blood Culture No Growth at 48 hrs  Blood culture    Collection Time: 11/13/17  5:53 AM   Result Value Ref Range    Blood Culture No Growth at 48 hrs      Magnesium    Collection Time: 11/13/17  5:53 AM   Result Value Ref Range    Magnesium 1 9 1 6 - 2 6 mg/dL   Phosphorus    Collection Time: 11/13/17  5:53 AM   Result Value Ref Range    Phosphorus 3 2 2 7 - 4 5 mg/dL   Manual Differential (Non Wam)    Collection Time: 11/13/17  5:53 AM   Result Value Ref Range    Segmented % 51 %    Bands % 34 (H) 0 - 8 %    Lymphocytes % 4 %    Monocytes % 9 4 - 12 %    Eosinophils % 1 0 - 6 %    Metamyelocytes% 1 0 - 1 %    Neutrophils Absolute 7 40 (H) 1 81 - 6 82 Thousand/uL    Lymphocytes Absolute 0 35 (L) 0 60 - 4 47 Thousand/uL    Monocytes Absolute 0 78 0 00 - 1 22 Thousand/uL    Eosinophils Absolute 0 09 0 00 - 0 61 Thousand/uL    Total Counted 100     Platelet Estimate Adequate Adequate   Urine culture    Collection Time: 11/13/17  2:32 PM   Result Value Ref Range    Urine Culture Culture results to follow      CBC and differential    Collection Time: 11/14/17  5:17 AM   Result Value Ref Range    WBC 8 70 4 80 - 10 80 Thousand/uL    RBC 4 00 (L) 4 70 - 6 10 Million/uL    Hemoglobin 11 5 (L) 14 0 - 18 0 g/dL    Hematocrit 34 7 (L) 42 0 - 52 0 %    MCV 87 82 - 98 fL    MCH 28 7 27 0 - 31 0 pg    MCHC 33 1 31 4 - 37 4 g/dL    RDW 14 4 11 6 - 15 1 %    MPV 7 6 (L) 8 9 - 12 7 fL    Platelets 479 (L) 726 - 400 Thousands/uL    Neutrophils Relative 84 (H) 43 - 75 %    Lymphocytes Relative 8 (L) 14 - 44 %    Monocytes Relative 7 4 - 12 %    Eosinophils Relative 1 0 - 6 %    Basophils Relative 0 0 - 1 %    Neutrophils Absolute 7 30 1 85 - 7 62 Thousands/µL    Lymphocytes Absolute 0 70 0 60 - 4 47 Thousands/µL    Monocytes Absolute 0 60 0 17 - 1 22 Thousand/µL    Eosinophils Absolute 0 10 0 00 - 0 61 Thousand/µL    Basophils Absolute 0 00 0 00 - 0 10 Thousands/µL   Basic metabolic panel    Collection Time: 11/14/17  5:17 AM   Result Value Ref Range    Sodium 144 136 - 145 mmol/L    Potassium 3 7 3 5 - 5 3 mmol/L    Chloride 110 (H) 100 - 108 mmol/L    CO2 26 21 - 32 mmol/L    Anion Gap 8 4 - 13 mmol/L    BUN 7 5 - 25 mg/dL    Creatinine 0 22 (L) 0 60 - 1 30 mg/dL    Glucose 95 65 - 140 mg/dL    Calcium 8 3 8 3 - 10 1 mg/dL    eGFR 167 ml/min/1 73sq m   Magnesium    Collection Time: 11/14/17  5:17 AM   Result Value Ref Range    Magnesium 2 3 1 6 - 2 6 mg/dL   Phosphorus    Collection Time: 11/14/17  5:17 AM   Result Value Ref Range    Phosphorus 3 2 2 7 - 4 5 mg/dL   Bronchial culture and Gram stain    Collection Time: 11/14/17 12:59 PM   Result Value Ref Range    Bronchial Culture Culture too young- will reincubate     Gram Stain Result 4+ Polys     Gram Stain Result 2+ Gram positive cocci in pairs     Gram Stain Result 1+ Gram positive cocci in clusters    AFB Culture with Stain    Collection Time: 11/14/17 12:59 PM   Result Value Ref Range    AFB Stain No acid fast bacilli seen    CBC and Platelet    Collection Time: 11/15/17  4:24 AM   Result Value Ref Range    WBC 7 10 4 80 - 10 80 Thousand/uL    RBC 3 73 (L) 4 70 - 6 10 Million/uL    Hemoglobin 10 4 (L) 14 0 - 18 0 g/dL    Hematocrit 32 0 (L) 42 0 - 52 0 %    MCV 86 82 - 98 fL    MCH 27 8 27 0 - 31 0 pg    MCHC 32 5 31 4 - 37 4 g/dL    RDW 15 3 (H) 11 6 - 15 1 %    Platelets 936 567 - 172 Thousands/uL    MPV 7 8 (L) 8 9 - 12 7 fL   Basic metabolic panel    Collection Time: 11/15/17  4:24 AM   Result Value Ref Range    Sodium 146 (H) 136 - 145 mmol/L    Potassium 3 6 3 5 - 5 3 mmol/L    Chloride 111 (H) 100 - 108 mmol/L    CO2 29 21 - 32 mmol/L    Anion Gap 6 4 - 13 mmol/L    BUN 4 (L) 5 - 25 mg/dL    Creatinine <0 15 (L) 0 60 - 1 30 mg/dL    Glucose 79 65 - 140 mg/dL    Calcium 8 2 (L) 8 3 - 10 1 mg/dL    eGFR  ml/min/1 73sq m   Magnesium    Collection Time: 11/15/17  4:24 AM   Result Value Ref Range    Magnesium 1 8 1 6 - 2 6 mg/dL   Phosphorus    Collection Time: 11/15/17  4:24 AM   Result Value Ref Range    Phosphorus 2 7 2 7 - 4 5 mg/dL       XR chest portable ICU   Final Result   Slight worsening of bilateral alveolar infiltrates  Workstation performed: [id number]         XR chest portable   Final Result      Improved aeration of the left lung with some residual left lower lobe infiltrate or atelectasis  There is no pneumothorax           Workstation performed: [id number]         XR chest portable   Final Result      Completely opacified left lung likely related to left lung atelectasis, perhaps related to mucous plugging  ##[email or web address]##imslh         Workstation performed: [id number]         XR chest portable ICU   Final Result   1  Evolving left lower lobe infiltrate representing atelectasis or pneumonia  2   Low-lying endotracheal tube with tip projecting towards the left mainstem bronchus  Repositioning is recommended  3   Malpositioned orogastric tube with tip projecting into the EG junction  Repositioning recommended  ##[email or web address]##sigslh         Workstation performed: [id number]         XR chest portable   Final Result      ET tube tip above the hong  Workstation performed: [id number]         XR chest portable   Final Result      No active pulmonary disease  Workstation performed: [id number]         XR skull < 4 vw   Final Result      No radiopaque foreign body  Workstation performed: [id number]         MRI brain w wo contrast   Final Result   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      No focal brain parenchymal abnormalities      No acute intracranial hemorrhage or mass effect      No pathologic enhancement               Workstation performed: [id number]         XR chest 1 view portable   Final Result   Diminished inspiration  No active pulmonary disease  Workstation performed: [id number]             Invasive Devices     Peripheral Intravenous Line            Peripheral IV 11/15/17 Right Forearm less than 1 day                  Results from last 7 days  Lab Units 11/14/17  1259 11/13/17  1432 11/13/17  0553 11/12/17  2107 11/12/17 2052   BLOOD CULTURE   --   --  No Growth at 48 hrs    No Growth at 48 hrs   --   --    SPUTUM CULTURE   --   --   --  2+ Growth of Moraxella catarrhalis*  3+ Growth of   --    GRAM STAIN RESULT  4+ Polys  2+ Gram positive cocci in pairs  1+ Gram positive cocci in clusters  --   --  2+ Polys  2+ Gram positive cocci in pairs  1+ Gram positive rods  1+ Gram negative rods  --    URINE CULTURE   --  Culture results to follow   --  >100,000 cfu/ml Enterococcus faecalis*  --    MRSA CULTURE ONLY   --   --   --   --  No Methicillin Resistant Staphlyococcus aureus (MRSA) isolated       Assessment:  Acute respiratory failure with hypoxia getting better  Aspiration pneumonia  Atelectasis  Urinary tract infection  Frequent falls with head injury  Postconcussion syndrome  Severe intellectual disability  Chronic dysphagia      Plan:  Continue with IV antibiotics continue with IV fluids  Aspiration precautions  Discussed with the nursing staff  Labs tomorrow morning

## 2017-11-16 LAB
ANION GAP SERPL CALCULATED.3IONS-SCNC: 3 MMOL/L (ref 4–13)
BACTERIA BRONCH AEROBE CULT: NORMAL
BACTERIA UR CULT: ABNORMAL
BASOPHILS # BLD AUTO: 0 THOUSANDS/ΜL (ref 0–0.1)
BASOPHILS NFR BLD AUTO: 0 % (ref 0–1)
BUN SERPL-MCNC: 3 MG/DL (ref 5–25)
CALCIUM SERPL-MCNC: 8.4 MG/DL (ref 8.3–10.1)
CHLORIDE SERPL-SCNC: 107 MMOL/L (ref 100–108)
CO2 SERPL-SCNC: 33 MMOL/L (ref 21–32)
CREAT SERPL-MCNC: 0.17 MG/DL (ref 0.6–1.3)
EOSINOPHIL # BLD AUTO: 0.2 THOUSAND/ΜL (ref 0–0.61)
EOSINOPHIL NFR BLD AUTO: 3 % (ref 0–6)
ERYTHROCYTE [DISTWIDTH] IN BLOOD BY AUTOMATED COUNT: 15.5 % (ref 11.6–15.1)
GFR SERPL CREATININE-BSD FRML MDRD: 186 ML/MIN/1.73SQ M
GLUCOSE SERPL-MCNC: 89 MG/DL (ref 65–140)
GRAM STN SPEC: NORMAL
HCT VFR BLD AUTO: 34.2 % (ref 42–52)
HGB BLD-MCNC: 11 G/DL (ref 14–18)
LYMPHOCYTES # BLD AUTO: 0.4 THOUSANDS/ΜL (ref 0.6–4.47)
LYMPHOCYTES NFR BLD AUTO: 7 % (ref 14–44)
MCH RBC QN AUTO: 27.7 PG (ref 27–31)
MCHC RBC AUTO-ENTMCNC: 32.2 G/DL (ref 31.4–37.4)
MCV RBC AUTO: 86 FL (ref 82–98)
MONOCYTES # BLD AUTO: 0.4 THOUSAND/ΜL (ref 0.17–1.22)
MONOCYTES NFR BLD AUTO: 7 % (ref 4–12)
NEUTROPHILS # BLD AUTO: 5 THOUSANDS/ΜL (ref 1.85–7.62)
NEUTS SEG NFR BLD AUTO: 83 % (ref 43–75)
NRBC BLD AUTO-RTO: 0 /100 WBCS
PLATELET # BLD AUTO: 151 THOUSANDS/UL (ref 130–400)
PMV BLD AUTO: 6.9 FL (ref 8.9–12.7)
POTASSIUM SERPL-SCNC: 4.4 MMOL/L (ref 3.5–5.3)
RBC # BLD AUTO: 3.98 MILLION/UL (ref 4.7–6.1)
SODIUM SERPL-SCNC: 143 MMOL/L (ref 136–145)
WBC # BLD AUTO: 6 THOUSAND/UL (ref 4.8–10.8)

## 2017-11-16 PROCEDURE — 92526 ORAL FUNCTION THERAPY: CPT

## 2017-11-16 PROCEDURE — 85025 COMPLETE CBC W/AUTO DIFF WBC: CPT | Performed by: INTERNAL MEDICINE

## 2017-11-16 PROCEDURE — 94640 AIRWAY INHALATION TREATMENT: CPT

## 2017-11-16 PROCEDURE — 94669 MECHANICAL CHEST WALL OSCILL: CPT

## 2017-11-16 PROCEDURE — 80048 BASIC METABOLIC PNL TOTAL CA: CPT | Performed by: INTERNAL MEDICINE

## 2017-11-16 PROCEDURE — 94760 N-INVAS EAR/PLS OXIMETRY 1: CPT

## 2017-11-16 RX ADMIN — AMPICILLIN SODIUM AND SULBACTAM SODIUM 1.5 G: 1; .5 INJECTION, POWDER, FOR SOLUTION INTRAMUSCULAR; INTRAVENOUS at 00:27

## 2017-11-16 RX ADMIN — IPRATROPIUM BROMIDE AND ALBUTEROL SULFATE 3 ML: .5; 3 SOLUTION RESPIRATORY (INHALATION) at 13:33

## 2017-11-16 RX ADMIN — ENOXAPARIN SODIUM 40 MG: 40 INJECTION SUBCUTANEOUS at 09:12

## 2017-11-16 RX ADMIN — LORAZEPAM 1 MG: 2 INJECTION INTRAMUSCULAR; INTRAVENOUS at 18:01

## 2017-11-16 RX ADMIN — DIVALPROEX SODIUM 250 MG: 250 TABLET, DELAYED RELEASE ORAL at 21:33

## 2017-11-16 RX ADMIN — IPRATROPIUM BROMIDE AND ALBUTEROL SULFATE 3 ML: .5; 3 SOLUTION RESPIRATORY (INHALATION) at 21:21

## 2017-11-16 RX ADMIN — DIVALPROEX SODIUM 750 MG: 250 TABLET, DELAYED RELEASE ORAL at 09:10

## 2017-11-16 RX ADMIN — Medication 1 TABLET: at 21:33

## 2017-11-16 RX ADMIN — AMITRIPTYLINE HYDROCHLORIDE 25 MG: 10 TABLET, FILM COATED ORAL at 21:33

## 2017-11-16 RX ADMIN — AMPICILLIN SODIUM AND SULBACTAM SODIUM 1.5 G: 1; .5 INJECTION, POWDER, FOR SOLUTION INTRAMUSCULAR; INTRAVENOUS at 12:35

## 2017-11-16 RX ADMIN — ESCITALOPRAM OXALATE 20 MG: 10 TABLET ORAL at 09:10

## 2017-11-16 RX ADMIN — IPRATROPIUM BROMIDE AND ALBUTEROL SULFATE 3 ML: .5; 3 SOLUTION RESPIRATORY (INHALATION) at 07:12

## 2017-11-16 RX ADMIN — IPRATROPIUM BROMIDE AND ALBUTEROL SULFATE 3 ML: .5; 3 SOLUTION RESPIRATORY (INHALATION) at 02:56

## 2017-11-16 RX ADMIN — ACETYLCYSTEINE 600 MG: 200 SOLUTION ORAL; RESPIRATORY (INHALATION) at 21:22

## 2017-11-16 RX ADMIN — CHLORHEXIDINE GLUCONATE 15 ML: 1.2 RINSE ORAL at 21:35

## 2017-11-16 RX ADMIN — ACETYLCYSTEINE 200 MG: 200 SOLUTION ORAL; RESPIRATORY (INHALATION) at 07:11

## 2017-11-16 RX ADMIN — ARIPIPRAZOLE 30 MG: 10 TABLET ORAL at 09:12

## 2017-11-16 RX ADMIN — POLYETHYLENE GLYCOL 3350 17 G: 17 POWDER, FOR SOLUTION ORAL at 09:12

## 2017-11-16 RX ADMIN — AMPICILLIN SODIUM AND SULBACTAM SODIUM 1.5 G: 1; .5 INJECTION, POWDER, FOR SOLUTION INTRAMUSCULAR; INTRAVENOUS at 05:58

## 2017-11-16 RX ADMIN — ATORVASTATIN CALCIUM 20 MG: 20 TABLET, FILM COATED ORAL at 21:33

## 2017-11-16 RX ADMIN — ACETYLCYSTEINE 600 MG: 200 SOLUTION ORAL; RESPIRATORY (INHALATION) at 13:34

## 2017-11-16 RX ADMIN — ACETYLCYSTEINE 600 MG: 200 SOLUTION ORAL; RESPIRATORY (INHALATION) at 02:56

## 2017-11-16 RX ADMIN — ASPIRIN 81 MG 81 MG: 81 TABLET ORAL at 09:11

## 2017-11-16 RX ADMIN — AMPICILLIN SODIUM AND SULBACTAM SODIUM 1.5 G: 1; .5 INJECTION, POWDER, FOR SOLUTION INTRAMUSCULAR; INTRAVENOUS at 17:09

## 2017-11-16 RX ADMIN — QUETIAPINE FUMARATE 100 MG: 100 TABLET ORAL at 21:33

## 2017-11-16 NOTE — SPEECH THERAPY NOTE
SLP  Swallow Therapy Note    S:  Patient very restless in bed, no pain evident  Sitter at bedside reported patient ate a few bites of breakfast   Aide reported he wasn't coughing while eating but noted some cough afterwards  O:  Dysphagia 1/ Puree diet and Pudding Thick liquids received  Patient reassessed with puree and pudding thick liquids  He consumed 4 oz of apple sauce with timely oral and pharyngeal phases, no immediate post swallow coughing observed  However, noted delayed wet cough x 2 following intakes  A:  Patient with delayed coughing following PO intakes  Need to attempt MBS study if patient will cooperate/ remain in the X-ray field to adequately view oral/ pharyngeal swallow function and r/o aspiration  P:  Recommend:  1  Consider NPO status/ alternate means of nutritional support  2   Maintain aspiration and reflux precautions  3  MBS study to objectively assess swallow function  D/w patient's RN, aides, and Dr Kaur Roman   Will continue to follow safe swallow skills

## 2017-11-16 NOTE — PROGRESS NOTES
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 dementia and psychosis and depression I reviewed his medication and spoke with the staff patient has been on one-to-one observation for the safety  Patient is getting his psychotropic medications  Patient is not lethargic not in distress at this time he has a periods of agitation  No side effects of Elavil Lexapro Depakote Abilify Seroquel and Ativan p r n  patient is psychotropic medications to control his behavior patient is a total care  Medical treatment is in progress  No meaningful communication  Due to patient is mentally retarded suffering from dementia depression and psychosis  Patient is not lethargic and no new behavior problem reported or noted  I will continue medications the same at this time and follow up  Thank you very much

## 2017-11-16 NOTE — PLAN OF CARE
Problem: Nutrition/Hydration-ADULT  Goal: Nutrient/Hydration intake appropriate for improving, restoring or maintaining nutritional needs  Monitor and assess patient's nutrition/hydration status for malnutrition (ex- brittle hair, bruises, dry skin, pale skin and conjunctiva, muscle wasting, smooth red tongue, and disorientation)  Collaborate with interdisciplinary team and initiate plan and interventions as ordered  Monitor patient's weight and dietary intake as ordered or per policy  Utilize nutrition screening tool and intervene per policy  Determine patient's food preferences and provide high-protein, high-caloric foods as appropriate  INTERVENTIONS:  - Monitor oral intake, urinary output, labs, and treatment plans  - Assess nutrition and hydration status and recommend course of action  - Evaluate amount of meals eaten  - Assist patient with eating if necessary   - Allow adequate time for meals  - Recommend/ encourage appropriate diets, oral nutritional supplements, and vitamin/mineral supplements  - Order, calculate, and assess calorie counts as needed  - Assess need for intravenous fluids  - Provide specific nutrition/hydration education as appropriate  - Include patient/family/caregiver in decisions related to nutrition   Outcome: Not Progressing  Intakes not meeting estimated needs  Supplement added for additional kcal/protein

## 2017-11-16 NOTE — PLAN OF CARE
DISCHARGE PLANNING - CARE MANAGEMENT     Discharge to post-acute care or home with appropriate resources Progressing        Nutrition/Hydration-ADULT     Nutrient/Hydration intake appropriate for improving, restoring or maintaining nutritional needs Progressing        Potential for Falls     Patient will remain free of falls Progressing        Prexisting or High Potential for Compromised Skin Integrity     Skin integrity is maintained or improved Progressing        RESPIRATORY - ADULT     Achieves optimal ventilation and oxygenation Progressing        SAFETY,RESTRAINT: NV/NON-SELF DESTRUCTIVE BEHAVIOR     Remains free of harm/injury (restraint for non violent/non self-detsructive behavior) Progressing     Returns to optimal restraint-free functioning Progressing        SKIN/TISSUE INTEGRITY - ADULT     Skin integrity remains intact Progressing

## 2017-11-17 ENCOUNTER — HOSPITAL ENCOUNTER (INPATIENT)
Dept: RADIOLOGY | Facility: HOSPITAL | Age: 57
Discharge: HOME | DRG: 913 | End: 2017-11-17
Payer: MEDICARE

## 2017-11-17 ENCOUNTER — GENERIC CONVERSION - ENCOUNTER (OUTPATIENT)
Dept: OTHER | Facility: OTHER | Age: 57
End: 2017-11-17

## 2017-11-17 LAB
ATRIAL RATE: 97 BPM
P AXIS: 42 DEGREES
PR INTERVAL: 120 MS
QRS AXIS: -73 DEGREES
QRSD INTERVAL: 84 MS
QT INTERVAL: 380 MS
QTC INTERVAL: 482 MS
T WAVE AXIS: 63 DEGREES
VENTRICULAR RATE: 97 BPM

## 2017-11-17 PROCEDURE — 74230 X-RAY XM SWLNG FUNCJ C+: CPT

## 2017-11-17 PROCEDURE — 94640 AIRWAY INHALATION TREATMENT: CPT

## 2017-11-17 PROCEDURE — 94668 MNPJ CHEST WALL SBSQ: CPT

## 2017-11-17 PROCEDURE — 94669 MECHANICAL CHEST WALL OSCILL: CPT

## 2017-11-17 PROCEDURE — 0T9B70Z DRAINAGE OF BLADDER WITH DRAINAGE DEVICE, VIA NATURAL OR ARTIFICIAL OPENING: ICD-10-PCS | Performed by: INTERNAL MEDICINE

## 2017-11-17 PROCEDURE — 94760 N-INVAS EAR/PLS OXIMETRY 1: CPT

## 2017-11-17 PROCEDURE — 92611 MOTION FLUOROSCOPY/SWALLOW: CPT

## 2017-11-17 RX ORDER — DEXTROSE AND SODIUM CHLORIDE 5; .45 G/100ML; G/100ML
60 INJECTION, SOLUTION INTRAVENOUS CONTINUOUS
Status: DISCONTINUED | OUTPATIENT
Start: 2017-11-17 | End: 2017-11-25

## 2017-11-17 RX ADMIN — ASPIRIN 81 MG 81 MG: 81 TABLET ORAL at 10:08

## 2017-11-17 RX ADMIN — DIVALPROEX SODIUM 750 MG: 250 TABLET, DELAYED RELEASE ORAL at 10:07

## 2017-11-17 RX ADMIN — ESCITALOPRAM OXALATE 20 MG: 10 TABLET ORAL at 10:08

## 2017-11-17 RX ADMIN — DIVALPROEX SODIUM 750 MG: 250 TABLET, DELAYED RELEASE ORAL at 14:51

## 2017-11-17 RX ADMIN — AMPICILLIN SODIUM AND SULBACTAM SODIUM 1.5 G: 1; .5 INJECTION, POWDER, FOR SOLUTION INTRAMUSCULAR; INTRAVENOUS at 00:35

## 2017-11-17 RX ADMIN — DIVALPROEX SODIUM 250 MG: 250 TABLET, DELAYED RELEASE ORAL at 22:34

## 2017-11-17 RX ADMIN — QUETIAPINE FUMARATE 100 MG: 100 TABLET ORAL at 22:14

## 2017-11-17 RX ADMIN — ARIPIPRAZOLE 30 MG: 10 TABLET ORAL at 10:07

## 2017-11-17 RX ADMIN — AMPICILLIN SODIUM AND SULBACTAM SODIUM 1.5 G: 1; .5 INJECTION, POWDER, FOR SOLUTION INTRAMUSCULAR; INTRAVENOUS at 05:28

## 2017-11-17 RX ADMIN — IPRATROPIUM BROMIDE AND ALBUTEROL SULFATE 3 ML: .5; 3 SOLUTION RESPIRATORY (INHALATION) at 08:29

## 2017-11-17 RX ADMIN — ACETYLCYSTEINE 600 MG: 200 SOLUTION ORAL; RESPIRATORY (INHALATION) at 01:49

## 2017-11-17 RX ADMIN — AMPICILLIN SODIUM AND SULBACTAM SODIUM 1.5 G: 1; .5 INJECTION, POWDER, FOR SOLUTION INTRAMUSCULAR; INTRAVENOUS at 12:53

## 2017-11-17 RX ADMIN — Medication 1 TABLET: at 22:14

## 2017-11-17 RX ADMIN — POLYETHYLENE GLYCOL 3350 17 G: 17 POWDER, FOR SOLUTION ORAL at 10:20

## 2017-11-17 RX ADMIN — IPRATROPIUM BROMIDE AND ALBUTEROL SULFATE 3 ML: .5; 3 SOLUTION RESPIRATORY (INHALATION) at 01:49

## 2017-11-17 RX ADMIN — IPRATROPIUM BROMIDE AND ALBUTEROL SULFATE 3 ML: .5; 3 SOLUTION RESPIRATORY (INHALATION) at 21:12

## 2017-11-17 RX ADMIN — ACETYLCYSTEINE 600 MG: 200 SOLUTION ORAL; RESPIRATORY (INHALATION) at 08:29

## 2017-11-17 RX ADMIN — ATORVASTATIN CALCIUM 20 MG: 20 TABLET, FILM COATED ORAL at 22:14

## 2017-11-17 RX ADMIN — DEXTROSE AND SODIUM CHLORIDE 60 ML/HR: 5; 450 INJECTION, SOLUTION INTRAVENOUS at 22:09

## 2017-11-17 RX ADMIN — ENOXAPARIN SODIUM 40 MG: 40 INJECTION SUBCUTANEOUS at 10:20

## 2017-11-17 RX ADMIN — AMITRIPTYLINE HYDROCHLORIDE 25 MG: 10 TABLET, FILM COATED ORAL at 22:13

## 2017-11-17 RX ADMIN — IPRATROPIUM BROMIDE AND ALBUTEROL SULFATE 3 ML: .5; 3 SOLUTION RESPIRATORY (INHALATION) at 14:52

## 2017-11-17 RX ADMIN — AMPICILLIN SODIUM AND SULBACTAM SODIUM 1.5 G: 1; .5 INJECTION, POWDER, FOR SOLUTION INTRAMUSCULAR; INTRAVENOUS at 18:55

## 2017-11-17 RX ADMIN — CHLORHEXIDINE GLUCONATE 15 ML: 1.2 RINSE ORAL at 10:07

## 2017-11-17 RX ADMIN — CHLORHEXIDINE GLUCONATE 15 ML: 1.2 RINSE ORAL at 22:13

## 2017-11-17 NOTE — PROCEDURES
Video Fluoroscopic Swallow Study      Patient Name: Roberth Feng  NEQFX'C Date: 2017    Past Medical History  Past Medical History:   Diagnosis Date    Deviated nasal septum     Eczema     Gastritis     Hyperlipidemia     Impulse control disorder     Insomnia     Mental retardation     Osteoarthritis     Psychiatric disorder     atypical psychosis, impulse control disorder    Ptosis of both eyelids     r > l    Scoliosis      Past Surgical History  Past Surgical History:   Procedure Laterality Date    [place] [address] INCL FLUOR GDNCE DX W/CELL WASHG SPX N/A 2017    Procedure: BRONCHOSCOPY FLEXIBLE;  Surgeon: Remberto Godwin MD;  Location: [place] [address] [facility] LAB; Service: Pulmonary     General Information:  Ordering Physician: Dr Edmond Rhodes  Radiologist: Dr Marshal Oliver  Date of Order: 17  Date of Evaluation: 17  Type of Study: Initial MBS study  Diet Prior to this Study: Puree diet and Pudding Thick liquids  Past Medical History: MR, frequent falls  Additional History: Patient with delayed coughing following all POs/ meals  Positionin degrees in the lateral plane  Materials Administered: Puree, Honey thick liquid    Oral Stage:  Puree: Reduced posterior propulsion  Honey Thick Liquid: Reduced posterior propulsion  Oral Stage Performance: Moderately impaired     Pharyngeal Stage:  Puree: Delayed swallow initiation, Pharyngeal residue - valleculae and pyriforms  Honey Thick Liquid: Delayed swallow initiation, Pharyngeal residue - valleculae and pyriforms  Aspiration Risk: No laryngeal penetration or aspiration seen during or following all swallows  Delayed coughing was observed following intakes  Suspect secondary to large pharryngeal residuals  Swallow Initiation was: Mildly delayed  Hyolaryngeal Excursion was: Adequate  Epiglottic Inversion was: Present  Tongue Drive was: Mild to moderately weak  Pharyngeal Constriction was:  Moderately weak  Cricopharyngeal Opening/ Relaxation was: Adequate  Cervical Osteophytes: Noted, with no impact on transit of food through the Pharynx     Esophageal Stage:  Within functional limits at the cervical level, no back flow or stasis evident  Impressions:  Oral pharyngeal swallow skills are functional for puree and pudding thick liquids      Diagnosis/ Prognosis:  Moderate oral pharyngeal dysphagia  Prognosis for Safe Diet Advancement: Poor  Barriers to Reach Goals: Cognitive deficits, Severity of dysphagia     Recommendations:  Diet Texture: Dysphagia 1/ Puree  Liquid Consistency: Pudding Thick Liquid  Liquid Administration: Teaspoon  Medication Administration: Medication crushed in a puree bolus  Suggested Positioning: Upright/ 90 degrees  Suggested Precautions: Aspiration precautions, Feed when alert fully upright position, Cue for small bites/ sips and slow rate, Remain upright 45 degrees after meals, Reflux precautions  Strategies: Small bites/ sips  Dysphagia Treatment Recommended: No  Consider Follow-up VBS: PRN

## 2017-11-17 NOTE — CONSULTS
Consultation - [facility]  Sridhar Levine 61 y o  male MRN: [id number]  Unit/Bed#: 2 [place] [address] Encounter: [id number]    Requesting Physician: Fiona Hemphill MD    Assessment/Plan:    Urinary retention  Acute vs chronic  Creatinine <0 15 on 11/15/17 and 0 17 on 11/16/17  High risk of dill trauma  Self extubated and pulling on his IV's  Straight cathed for 650 mL 11/17/2017 at 8:30 a m  after bladder scan for 999 mL  Constipation on Colace and MiraLax  Small stool recorded on 11/15/2017  Large BM today at 1620    14fr dill placed by Dmitry Grimaldo PA-C and inflated with 3 cc of sterile water for 1100cc amy urine  Position confirmed and secured with tape  His brother Federico Felix, after a long almost combative conversation, notes his brother was able to ambulate and void on the toilet prior to his falls and hospitalization  Perhaps there are issues with understanding of medical condition with the brother  He may be challenged also  Patient with recent falls making Flomax usage undesirable  · Leave Dill for bladder decompression and plan for voiding trial in approximately 2-3 days  · May need cystoscopy if he fails that voiding trial   Will need clearance from his state guardian, not the brother  · Hold on Flomax secondary to his recent falls  Enterococcus urinary tract infection 11/13/2017  Unasyn 1 5 g IV Q 6 hours for urinary tract infection and aspiration pneumonia    HISTORY OF PRESENT ILLNESS:    Sridhar Levine is a 61y o  year old male who we are asked to see for urinary retention  Patient previously was bladder scanned for over 999 mL and subsequently was straight cathed for 650 mL earlier today  Consult was obtained due to his urinary retention and urinary tract infection which puts him at a higher risk of sepsis  Patient previously was noted to have constipation, but had large bowel movement prior to Dill catheter insertion    Spoke with his brother, Federico Felix,  on the phone for approximately 20 minutes regarding patient's care  He was very demanding and questioning all aspects of his brother's care  He did note that his brother previously was able to urinate on the toilet  Limited medical understanding exhibited  Not seeking permission from him for dill, but rather trying to educate him on his brother's care  PAST MEDICAL HISTORY:  Past Medical History:   Diagnosis Date    Deviated nasal septum     Eczema     Gastritis     Hyperlipidemia     Impulse control disorder     Insomnia     Mental retardation     Osteoarthritis     Psychiatric disorder     atypical psychosis, impulse control disorder    Ptosis of both eyelids     r > l    Scoliosis        PAST SURGICAL HISTORY:  Past Surgical History:   Procedure Laterality Date    [place] [address] INCL FLUOR GDNCE DX W/CELL WASHG SPX N/A 11/14/2017    Procedure: BRONCHOSCOPY FLEXIBLE;  Surgeon: Nando Cifuentes MD;  Location: [facility]; Service: Pulmonary       ALLERGIES:  Allergies   Allergen Reactions    Haldol [Haloperidol]     Navane [Thiothixene]     Thorazine [Chlorpromazine]        SOCIAL HISTORY:  History   Alcohol Use No     History   Drug Use No     History   Smoking Status    Never Smoker   Smokeless Tobacco    Never Used       FAMILY HISTORY:  History reviewed  No pertinent family history      MEDICATIONS:    Current Facility-Administered Medications:     amitriptyline (ELAVIL) tablet 25 mg, 25 mg, Oral, HS, Nito Camacho MD, 25 mg at 11/16/17 2133    ampicillin-sulbactam (UNASYN) 1 5 g in sodium chloride 0 9 % 50 mL IVPB, 1 5 g, Intravenous, Q6H, Aliyah Nazario MD, Last Rate: 100 mL/hr at 11/17/17 1253, 1 5 g at 11/17/17 1253    ARIPiprazole (ABILIFY) tablet 30 mg, 30 mg, Oral, Daily, Johnathon Balderas MD, 30 mg at 11/17/17 1007    aspirin chewable tablet 81 mg, 81 mg, Oral, Daily, Luisa Olivera PA-C, 81 mg at 11/17/17 1008    atorvastatin (LIPITOR) tablet 20 mg, 20 mg, Oral, HS, Johnathon Balderas MD, 20 mg at 11/16/17 2133    chlorhexidine (PERIDEX) 0 12 % oral rinse 15 mL, 15 mL, Swish & Spit, Q12H [address], Luisa Olivera PA-C, 15 mL at 11/17/17 1007    divalproex sodium (DEPAKOTE) EC tablet 250 mg, 250 mg, Oral, HS, Johnathon Balderas MD, 250 mg at 11/16/17 2133    divalproex sodium (DEPAKOTE) EC tablet 750 mg, 750 mg, Oral, QAM, Johnathon Balderas MD, 750 mg at 11/17/17 1007    divalproex sodium (DEPAKOTE) EC tablet 750 mg, 750 mg, Oral, After Lunch, Johnathon Balderas MD, 750 mg at 11/17/17 1451    enoxaparin (LOVENOX) subcutaneous injection 40 mg, 40 mg, Subcutaneous, Q24H [address], Nghia Ellsworth MD, 40 mg at 11/17/17 1020    escitalopram (LEXAPRO) tablet 20 mg, 20 mg, Oral, Daily, Johnathon Balderas MD, 20 mg at 11/17/17 1008    ipratropium-albuterol (DUO-NEB) 0 5-2 5 mg/mL inhalation solution 3 mL, 3 mL, Nebulization, Q6H, Melody Tomlinson PA-C, 3 mL at 11/17/17 1452    LORazepam (ATIVAN) 2 mg/mL injection 1 mg, 1 mg, Intravenous, Q4H PRN, Johnathon Balderas MD, 1 mg at 11/16/17 1801    polyethylene glycol (MIRALAX) packet 17 g, 17 g, Oral, Daily, Nghia Ellsworth MD, 17 g at 11/17/17 1020    QUEtiapine (SEROquel) tablet 100 mg, 100 mg, Oral, HS, Johnathon Balderas MD, 100 mg at 11/16/17 2133    senna-docusate sodium (SENOKOT S) 8 6-50 mg per tablet 1 tablet, 1 tablet, Oral, HS, Nghia Ellsworth MD, 1 tablet at 11/16/17 2133    REVIEW OF SYMPTOMS:  Review of Systems   Unable to perform ROS: Psychiatric disorder       PHYSICAL EXAM:  Vitals:    11/17/17 1436   BP: 123/70   Pulse: 102   Resp: 18   Temp: 97 9 °F (36 6 °C)   SpO2: 98%     Body mass index is 19 06 kg/m²  Physical Exam   Constitutional:   thin   HENT:   Head: Normocephalic  Pulmonary/Chest: Effort normal    Abdominal: He exhibits distension (Distended prior to Lundberg catheter insertion  Upon insertion distention resolved )  There is tenderness (Tender prior to Lundberg catheter placement)  Genitourinary: Penis normal    Musculoskeletal: He exhibits no edema  Very little muscle mass   Skin: Skin is warm  Vitals reviewed  Intake/Output Summary (Last 24 hours) at 11/17/17 1509  Last data filed at 11/17/17 0830   Gross per 24 hour   Intake              360 ml   Output             1050 ml   Net             -690 ml       LAB RESULTS:  Lab Results   Component Value Date    WBC 6 00 11/16/2017    HGB 11 0 (L) 11/16/2017    HCT 34 2 (L) 11/16/2017    MCV 86 11/16/2017     11/16/2017     Lab Results   Component Value Date    GLUCOSE 89 11/16/2017    CALCIUM 8 4 11/16/2017     11/16/2017    K 4 4 11/16/2017    CO2 33 (H) 11/16/2017     11/16/2017    BUN 3 (L) 11/16/2017    CREATININE 0 17 (L) 11/16/2017     Lab Results   Component Value Date    CALCIUM 8 4 11/16/2017    PHOS 2 7 11/15/2017         Obie Ya PA-C  11/17/17    Portions of the record may have been created with voice recognition software   Occasional wrong word or "sound a like" substitutions may have occurred due to the inherent limitations of voice recognition software   Read the chart carefully and recognize, using context, where substitutions have occurred

## 2017-11-17 NOTE — PROGRESS NOTES
Progress Note - Pulmonary   Kristin Mathew 61 y o  male MRN: [id number]  Unit/Bed#: [address] [place] [address] Encounter: [id number]  1   Acute hypoxemic respiratory failure requiring mechanical ventilation however he self extubated on 11/13/2017  He had mucous plugging on 11/14/2017 which resulted in complete left lung collapse   Bronchoscopy was performed on 11/14/2017 and airway secretions were suctioned  Discontinue nasal cannula- unsure of why this placed back on again- there is no documentation of desaturation  Continue with  nebulizer therapy, aggressive suctioning, vest therapy  Discontinue mucomyst     2   Chronic dysphagia-unable to perform proper swallow evaluation due to his intellectual disability   He will therefore be placed on the most conservative dysphagia diet   All meals to be given out of bed   He will remain out of bed in the chair for at least 2 hours post eating  Aspiration precautions     3   Moraxella in his sputum and UTI with Enterococcus   Moraxella as beta lactamase producing organism therefore will switch him to Unasyn  Urine sensitivities show pansensitivity  Treat with Unasyn for a total of 5 days  Today is day 4        4   Hypernatremia-resolved      5   Frequent falls which resulted in right forehead hematoma which has been stable   Further evaluation and management per Neurology      Other issues include:    Profound mental retardation  Hyperlipidemia______________________________________________________________________    Subjective: Pt seen and examined at bedside  Resting in bed  No acute distess  Discussed with patients RN had urinary retention again  No secretions      Tele Events:     Vitals:   Temp:  [96 2 °F (35 7 °C)-97 1 °F (36 2 °C)] 96 2 °F (35 7 °C)  HR:  [71-87] 87  Resp:  [18-22] 20  BP: (110-143)/(55-89) 143/55  Weight (last 2 days)     Date/Time   Weight    11/17/17 0600  48 8 (107 58)    11/16/17 0345  47 5 (104 72)    11/15/17 0600  47 6 (104 94)            SpO2: SpO2: 97 %, SpO2 Device: O2 Device: Nasal cannula    IV Infusions:       Nutrition:        Diet Orders            Start     Ordered    11/16/17 1122  Dietary nutrition supplements  Once     Question Answer Comment   Select Supplement: Ensure Pudding-Vanila    Frequency Breakfast, Lunch, Dinner        11/16/17 1122    11/14/17 1404  Diet Dysphagia/Modified Consistency; Dysphagia 1-Pureed; Dysphagia 1-Pureed; Pudding Thick Liquid  Diet effective now     Question Answer Comment   Diet Type Dysphagia/Modified Consistency    Dysphagia/Modified Consistency Dysphagia 1-Pureed    Other Restriction(s): Dysphagia 1-Pureed    Liquid Modifier Pudding Thick Liquid    RD to adjust diet per protocol? Yes        11/14/17 1403    11/08/17 1752  Room Service  Once     Question:  Type of Service  Answer:  Room Service- Not Appropriate    11/08/17 1751          Ins/Outs:   I/O       11/15 0701 - 11/16 0700 11/16 0701 - 11/17 0700 11/17 0701 - 11/18 0700    P  O   720     IV Piggyback 200      Total Intake(mL/kg) 200 (4 2) 720 (14 8)     Urine (mL/kg/hr) 1200 (1 1) 2000 (1 7) 650 (3 7)    Total Output 1200 2000 650    Net -1000 -1280 -650           Unmeasured Urine Occurrence 3 x 2 x           Lines/Drains:  Invasive Devices     Peripheral Intravenous Line            Peripheral IV 11/15/17 Right Forearm 1 day                 Active medications:  Scheduled Meds:  acetylcysteine 3 mL Nebulization Q6H   amitriptyline 25 mg Oral HS   ampicillin-sulbactam 1 5 g Intravenous Q6H   ARIPiprazole 30 mg Oral Daily   aspirin 81 mg Oral Daily   atorvastatin 20 mg Oral HS   chlorhexidine 15 mL Swish & Spit Q12H [address]   divalproex sodium 250 mg Oral HS   divalproex sodium 750 mg Oral QAM   divalproex sodium 750 mg Oral After Lunch   enoxaparin 40 mg Subcutaneous Q24H SHERICE   escitalopram 20 mg Oral Daily   ipratropium-albuterol 3 mL Nebulization Q6H   polyethylene glycol 17 g Oral Daily   QUEtiapine 100 mg Oral HS   senna-docusate sodium 1 tablet Oral HS     PRN Meds:  LORazepam 1 mg Q4H PRN     ____________________________________________________________________    Physical Exam:  Gen: NAD  HEENT: MMM, neck is supple  CVS: S1 S2 no murmur appreciated  Resp: CTA  Abd:soft NT ND BS+  Ext: no edema  Neuro: no focal deficits      ____________________________________________________________________    Labs:   CBC:   Results from last 7 days  Lab Units 11/16/17  0711 11/15/17  0424 11/14/17  0517   WBC Thousand/uL 6 00 7 10 8 70   HEMOGLOBIN g/dL 11 0* 10 4* 11 5*   HEMATOCRIT % 34 2* 32 0* 34 7*   MCV fL 86 86 87   PLATELETS Thousands/uL 151 134 118*     CMP:   Results from last 7 days  Lab Units 11/16/17  0711 11/15/17  0424 11/14/17  0517   SODIUM mmol/L 143 146* 144   POTASSIUM mmol/L 4 4 3 6 3 7   CHLORIDE mmol/L 107 111* 110*   CO2 mmol/L 33* 29 26   BUN mg/dL 3* 4* 7   CREATININE mg/dL 0 17* <0 15* 0 22*   GLUCOSE RANDOM mg/dL 89 79 95   CALCIUM mg/dL 8 4 8 2* 8 3   EGFR ml/min/1 73sq m 186  --  167     Magnesium:   Results from last 7 days  Lab Units 11/15/17  0424   MAGNESIUM mg/dL 1 8     Phosphorous:   Results from last 7 days  Lab Units 11/15/17  0424   PHOSPHORUS mg/dL 2 7     Troponin:     PT/INR:     Lactic Acid:   Results from last 7 days  Lab Units 11/12/17  2107   LACTIC ACID mmol/L 1 4     BNP:     TSH:         Imaging: No new imaging    Micro: Lab Results   Component Value Date    BLOODCX No Growth at 72 hrs  11/13/2017    BLOODCX No Growth at 72 hrs  11/13/2017    URINECX 10,000-19,000 cfu/ml Enterococcus faecalis (A) 11/13/2017    URINECX >100,000 cfu/ml Enterococcus faecalis (A) 11/12/2017    SPUTUMCULTUR 2+ Growth of Moraxella catarrhalis (A) 11/12/2017    SPUTUMCULTUR 3+ Growth of  11/12/2017    MRSACULTURE  11/12/2017     No Methicillin Resistant Staphlyococcus aureus (MRSA) isolated    BRONCHIALCUL 2+ Growth of  11/14/2017      No results for input(s): INFLUAPCR, INFLUBPCR, RSVPCR in the last 72 hours      Invalid input(s): LEGIONELLAURINARYANTIGEN        Code Status: Level 1 - Full Code

## 2017-11-17 NOTE — PROGRESS NOTES
Progress Note - Formerly [facility], [birth year] 61 y o  male MRN: [id number]    Unit/Bed#: 2 [place] [address] Encounter: [id number]        Subjective:   Chart reviewed patient evaluated urology note appreciated  Head in the discussion with breath the patient along with the guardian on the phone  Patient developed retention of the urine and had a Lundberg catheter placed in  Vital signs noted still periods of agitation  Patient also had a modified barium swallow currently patient is on  Diet is pureed with pudding thickened liquids  However I discussed with the nursing staff on the floor concern about aspiration will keep him NPO except meds            Review of Systems    Objective:     Vitals: Blood pressure 123/70, pulse 102, temperature 97 9 °F (36 6 °C), temperature source Tympanic, resp  rate 18, height 5' 3" (1 6 m), weight 48 8 kg (107 lb 9 4 oz), SpO2 95 %  ,Body mass index is 19 06 kg/m²        Intake/Output Summary (Last 24 hours) at 11/17/17 1824  Last data filed at 11/17/17 0830   Gross per 24 hour   Intake                0 ml   Output              650 ml   Net             -650 ml         Current Facility-Administered Medications:     amitriptyline (ELAVIL) tablet 25 mg, 25 mg, Oral, HS, Rhett Shane MD, 25 mg at 11/16/17 2133    ampicillin-sulbactam (UNASYN) 1 5 g in sodium chloride 0 9 % 50 mL IVPB, 1 5 g, Intravenous, Q6H, Mik Samayoa MD, Last Rate: 100 mL/hr at 11/17/17 1253, 1 5 g at 11/17/17 1253    ARIPiprazole (ABILIFY) tablet 30 mg, 30 mg, Oral, Daily, Johnathon Balderas MD, 30 mg at 11/17/17 1007    aspirin chewable tablet 81 mg, 81 mg, Oral, Daily, Luisa Olivera PA-C, 81 mg at 11/17/17 1008    atorvastatin (LIPITOR) tablet 20 mg, 20 mg, Oral, HS, Johnathon Balderas MD, 20 mg at 11/16/17 2133    chlorhexidine (PERIDEX) 0 12 % oral rinse 15 mL, 15 mL, Swish & Spit, Q12H [address], Luisa Olivera PA-C, 15 mL at 11/17/17 1007    divalproex sodium (DEPAKOTE) EC tablet 250 mg, 250 mg, Oral, HS, Johnathon Balderas, MD, 250 mg at 11/16/17 2133    divalproex sodium (DEPAKOTE) EC tablet 750 mg, 750 mg, Oral, QAM, Johnathon Balderas MD, 750 mg at 11/17/17 1007    divalproex sodium (DEPAKOTE) EC tablet 750 mg, 750 mg, Oral, After Lunch, Johnathon Balderas MD, 750 mg at 11/17/17 1451    enoxaparin (LOVENOX) subcutaneous injection 40 mg, 40 mg, Subcutaneous, Q24H [address], Nghia Ellsworth MD, 40 mg at 11/17/17 1020    escitalopram (LEXAPRO) tablet 20 mg, 20 mg, Oral, Daily, Johnathon Balderas MD, 20 mg at 11/17/17 1008    ipratropium-albuterol (DUO-NEB) 0 5-2 5 mg/mL inhalation solution 3 mL, 3 mL, Nebulization, Q6H, Melody Tomlinson PA-C, 3 mL at 11/17/17 1452    LORazepam (ATIVAN) 2 mg/mL injection 1 mg, 1 mg, Intravenous, Q4H PRN, Johnathon Balderas MD, 1 mg at 11/16/17 1801    polyethylene glycol (MIRALAX) packet 17 g, 17 g, Oral, Daily, Nghia Ellsworth MD, 17 g at 11/17/17 1020    QUEtiapine (SEROquel) tablet 100 mg, 100 mg, Oral, HS, Johnathon Balderas MD, 100 mg at 11/16/17 2133    senna-docusate sodium (SENOKOT S) 8 6-50 mg per tablet 1 tablet, 1 tablet, Oral, HS, Nghia Ellsworth MD, 1 tablet at 11/16/17 2133     Physical Exam   Constitutional:   Confused and disoriented periods of agitation   HENT:   Patient has ecchymotic areas on the forehead and also on the back of the head   Eyes: Conjunctivae are normal  Pupils are equal, round, and reactive to light  Neck: Neck supple  No thyromegaly present  Cardiovascular: Normal rate and regular rhythm  Pulmonary/Chest: No respiratory distress  He exhibits no tenderness  Transmitted sounds present   Abdominal: Soft  Bowel sounds are normal    Musculoskeletal: Normal range of motion  He exhibits no edema or deformity  Neurological: He is alert  Periods of agitation   Skin: Skin is warm and dry             Lab, Imaging and culture:     Recent Results (from the past 72 hour(s))   CBC and Platelet    Collection Time: 11/15/17  4:24 AM   Result Value Ref Range    WBC 7 10 4 80 - 10 80 Thousand/uL    RBC 3 73 (L) 4 70 - 6 10 Million/uL    Hemoglobin 10 4 (L) 14 0 - 18 0 g/dL    Hematocrit 32 0 (L) 42 0 - 52 0 %    MCV 86 82 - 98 fL    MCH 27 8 27 0 - 31 0 pg    MCHC 32 5 31 4 - 37 4 g/dL    RDW 15 3 (H) 11 6 - 15 1 %    Platelets 670 588 - 128 Thousands/uL    MPV 7 8 (L) 8 9 - 12 7 fL   Basic metabolic panel    Collection Time: 11/15/17  4:24 AM   Result Value Ref Range    Sodium 146 (H) 136 - 145 mmol/L    Potassium 3 6 3 5 - 5 3 mmol/L    Chloride 111 (H) 100 - 108 mmol/L    CO2 29 21 - 32 mmol/L    Anion Gap 6 4 - 13 mmol/L    BUN 4 (L) 5 - 25 mg/dL    Creatinine <0 15 (L) 0 60 - 1 30 mg/dL    Glucose 79 65 - 140 mg/dL    Calcium 8 2 (L) 8 3 - 10 1 mg/dL    eGFR  ml/min/1 73sq m   Magnesium    Collection Time: 11/15/17  4:24 AM   Result Value Ref Range    Magnesium 1 8 1 6 - 2 6 mg/dL   Phosphorus    Collection Time: 11/15/17  4:24 AM   Result Value Ref Range    Phosphorus 2 7 2 7 - 4 5 mg/dL   ECG 12 lead    Collection Time: 11/15/17 10:56 AM   Result Value Ref Range    Ventricular Rate 97 BPM    Atrial Rate 97 BPM    KY Interval 120 ms    QRSD Interval 84 ms    QT Interval 380 ms    QTC Interval 482 ms    P [place] 42 degrees    QRS Axis -73 degrees    T Wave Axis 63 degrees   Basic metabolic panel    Collection Time: 11/16/17  7:11 AM   Result Value Ref Range    Sodium 143 136 - 145 mmol/L    Potassium 4 4 3 5 - 5 3 mmol/L    Chloride 107 100 - 108 mmol/L    CO2 33 (H) 21 - 32 mmol/L    Anion Gap 3 (L) 4 - 13 mmol/L    BUN 3 (L) 5 - 25 mg/dL    Creatinine 0 17 (L) 0 60 - 1 30 mg/dL    Glucose 89 65 - 140 mg/dL    Calcium 8 4 8 3 - 10 1 mg/dL    eGFR 186 ml/min/1 73sq m   CBC and differential    Collection Time: 11/16/17  7:11 AM   Result Value Ref Range    WBC 6 00 4 80 - 10 80 Thousand/uL    RBC 3 98 (L) 4 70 - 6 10 Million/uL    Hemoglobin 11 0 (L) 14 0 - 18 0 g/dL    Hematocrit 34 2 (L) 42 0 - 52 0 %    MCV 86 82 - 98 fL    MCH 27 7 27 0 - 31 0 pg    MCHC 32 2 31 4 - 37 4 g/dL    RDW 15 5 (H) 11 6 - 15 1 %    MPV 6 9 (L) 8 9 - 12 7 fL    Platelets 576 079 - 206 Thousands/uL    nRBC 0 /100 WBCs    Neutrophils Relative 83 (H) 43 - 75 %    Lymphocytes Relative 7 (L) 14 - 44 %    Monocytes Relative 7 4 - 12 %    Eosinophils Relative 3 0 - 6 %    Basophils Relative 0 0 - 1 %    Neutrophils Absolute 5 00 1 85 - 7 62 Thousands/µL    Lymphocytes Absolute 0 40 (L) 0 60 - 4 47 Thousands/µL    Monocytes Absolute 0 40 0 17 - 1 22 Thousand/µL    Eosinophils Absolute 0 20 0 00 - 0 61 Thousand/µL    Basophils Absolute 0 00 0 00 - 0 10 Thousands/µL       FL barium swallow video w speech   Final Result      Large residuals in the valleculae and piriform sinuses  No evidence of laryngeal penetration or aspiration  Please see the Speech Therapist's report for a full description of findings and recommendations  Workstation performed: [id number]         XR chest portable ICU   Final Result   Slight worsening of bilateral alveolar infiltrates  Workstation performed: [id number]         XR chest portable   Final Result      Improved aeration of the left lung with some residual left lower lobe infiltrate or atelectasis  There is no pneumothorax  Workstation performed: [id number]         XR chest portable   Final Result      Completely opacified left lung likely related to left lung atelectasis, perhaps related to mucous plugging  ##imslh##imslh         Workstation performed: [id number]         XR chest portable ICU   Final Result   1  Evolving left lower lobe infiltrate representing atelectasis or pneumonia  2   Low-lying endotracheal tube with tip projecting towards the left mainstem bronchus  Repositioning is recommended  3   Malpositioned orogastric tube with tip projecting into the EG junction  Repositioning recommended  ##sigslh##sigslh         Workstation performed: [id number]         XR chest portable   Final Result      ET tube tip above the hong  Workstation performed: [id number]         XR chest portable   Final Result      No active pulmonary disease  Workstation performed: [id number]         XR skull < 4 vw   Final Result      No radiopaque foreign body  Workstation performed: [id number]         MRI brain w wo contrast   Final Result   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      No focal brain parenchymal abnormalities      No acute intracranial hemorrhage or mass effect      No pathologic enhancement               Workstation performed: [id number]         XR chest 1 view portable   Final Result   Diminished inspiration  No active pulmonary disease  Workstation performed: [id number]             Invasive Devices     Peripheral Intravenous Line            Peripheral IV 11/15/17 Right Forearm 2 days                  Results from last 7 days  Lab Units 11/14/17  1259 11/13/17  1432 11/13/17  0553 11/12/17 2107 11/12/17 2052   BLOOD CULTURE   --   --  No Growth After 4 Days  No Growth After 4 Days    --   --    SPUTUM CULTURE   --   --   --  2+ Growth of Moraxella catarrhalis*  3+ Growth of   --    GRAM STAIN RESULT  4+ Polys  2+ Gram positive cocci in pairs  1+ Gram positive cocci in clusters  --   --  2+ Polys  2+ Gram positive cocci in pairs  1+ Gram positive rods  1+ Gram negative rods  --    URINE CULTURE   --  10,000-19,000 cfu/ml Enterococcus faecalis*  --  >100,000 cfu/ml Enterococcus faecalis*  --    MRSA CULTURE ONLY   --   --   --   --  No Methicillin Resistant Staphlyococcus aureus (MRSA) isolated       Assessment:  Acute respiratory failure with hypoxia getting better  Aspiration pneumonia sputum showing Moraxella catarrhalis  Atelectasis  Urinary tract infection culture revealing Enterococcus faecalis  Frequent falls with head injury  Postconcussion syndrome  Severe intellectual disability  Chronic dysphagia  Urinary retention     Plan:  Continue with IV antibiotics continue with IV fluids  Aspiration precautions  NPO except meds  IV fluids  Discussed with the nursing staff  PT evaluation and treatment  Discussed with the patient's brother at length along with the guardian state-appointed

## 2017-11-17 NOTE — PROGRESS NOTES
Progress Note - Jaspal Elizalde 61 y o  male MRN: [id number]    Unit/Bed#: 2 [place] [address] Encounter: [id number]        Subjective:   Periods of agitation and had to be placed on one-to-one observation  Discussed with the nursing staff discussed with the speech therapist concern about coughing while eating  Patient should be in upright position while eating  Review of Systems    Objective:     Vitals: Blood pressure 110/89, pulse 71, temperature 97 8 °F (36 6 °C), temperature source Tympanic, resp  rate 22, height 5' 3" (1 6 m), weight 47 5 kg (104 lb 11 5 oz), SpO2 98 %  ,Body mass index is 18 55 kg/m²        Intake/Output Summary (Last 24 hours) at 11/16/17 2058  Last data filed at 11/16/17 1709   Gross per 24 hour   Intake              720 ml   Output             2300 ml   Net            -1580 ml         Current Facility-Administered Medications:     acetylcysteine (MUCOMYST) 200 mg/mL inhalation solution 600 mg, 3 mL, Nebulization, Q6H, Vladislav Cristina PA-C, 600 mg at 11/16/17 1334    amitriptyline (ELAVIL) tablet 25 mg, 25 mg, Oral, HS, Lenin Kay MD, 25 mg at 11/15/17 2253    ampicillin-sulbactam (UNASYN) 1 5 g in sodium chloride 0 9 % 50 mL IVPB, 1 5 g, Intravenous, Q6H, Zakia Conroy MD, Last Rate: 100 mL/hr at 11/16/17 1709, 1 5 g at 11/16/17 1709    ARIPiprazole (ABILIFY) tablet 30 mg, 30 mg, Oral, Daily, Johnathon Balderas MD, 30 mg at 11/16/17 0912    aspirin chewable tablet 81 mg, 81 mg, Oral, Daily, Luisa Olivera PA-C, 81 mg at 11/16/17 0911    atorvastatin (LIPITOR) tablet 20 mg, 20 mg, Oral, HS, Johnathon Balderas MD, 20 mg at 11/15/17 2253    chlorhexidine (PERIDEX) 0 12 % oral rinse 15 mL, 15 mL, Swish & Spit, Q12H [address], Luisa Olivera PA-C, 15 mL at 11/15/17 2254    divalproex sodium (DEPAKOTE) EC tablet 250 mg, 250 mg, Oral, HS, Johnathon Balderas MD, 250 mg at 11/15/17 2301    divalproex sodium (DEPAKOTE) EC tablet 750 mg, 750 mg, Oral, QAM, Johnathon Balderas MD, 750 mg at 11/16/17 0910    divalproex sodium (DEPAKOTE) EC tablet 750 mg, 750 mg, Oral, After Lunch, Johnathon Balderas MD    enoxaparin (LOVENOX) subcutaneous injection 40 mg, 40 mg, Subcutaneous, Q24H [address], Selina Raman MD, 40 mg at 11/16/17 0912    escitalopram (LEXAPRO) tablet 20 mg, 20 mg, Oral, Daily, Johnathon Balderas MD, 20 mg at 11/16/17 0910    ipratropium-albuterol (DUO-NEB) 0 5-2 5 mg/mL inhalation solution 3 mL, 3 mL, Nebulization, Q6H, Julio César Siddiqui PA-C, 3 mL at 11/16/17 1333    LORazepam (ATIVAN) 2 mg/mL injection 1 mg, 1 mg, Intravenous, Q4H PRN, Johnathon Balderas MD, 1 mg at 11/16/17 1801    polyethylene glycol (MIRALAX) packet 17 g, 17 g, Oral, Daily, Selina Raman MD, 17 g at 11/16/17 0912    QUEtiapine (SEROquel) tablet 100 mg, 100 mg, Oral, HS, Johnathon Balderas MD, 100 mg at 11/15/17 2301    senna-docusate sodium (SENOKOT S) 8 6-50 mg per tablet 1 tablet, 1 tablet, Oral, HS, Selina Raman MD, 1 tablet at 11/15/17 2254     Physical Exam   Constitutional:   Confused and disoriented periods of agitation   HENT:   Patient has ecchymotic areas on the forehead and also on the back of the head   Eyes: Conjunctivae are normal  Pupils are equal, round, and reactive to light  Neck: Neck supple  No thyromegaly present  Cardiovascular: Normal rate and regular rhythm  Pulmonary/Chest: No respiratory distress  He exhibits no tenderness  Transmitted sounds present   Abdominal: Soft  Bowel sounds are normal    Musculoskeletal: Normal range of motion  He exhibits no edema or deformity  Neurological: He is alert  Periods of agitation   Skin: Skin is warm and dry             Lab, Imaging and culture:     Recent Results (from the past 72 hour(s))   CBC and differential    Collection Time: 11/14/17  5:17 AM   Result Value Ref Range    WBC 8 70 4 80 - 10 80 Thousand/uL    RBC 4 00 (L) 4 70 - 6 10 Million/uL    Hemoglobin 11 5 (L) 14 0 - 18 0 g/dL    Hematocrit 34 7 (L) 42 0 - 52 0 %    MCV 87 82 - 98 fL    MCH 28 7 27 0 - 31 0 pg    MCHC 33 1 31 4 - 37 4 g/dL    RDW 14 4 11 6 - 15 1 %    MPV 7 6 (L) 8 9 - 12 7 fL    Platelets 702 (L) 121 - 400 Thousands/uL    Neutrophils Relative 84 (H) 43 - 75 %    Lymphocytes Relative 8 (L) 14 - 44 %    Monocytes Relative 7 4 - 12 %    Eosinophils Relative 1 0 - 6 %    Basophils Relative 0 0 - 1 %    Neutrophils Absolute 7 30 1 85 - 7 62 Thousands/µL    Lymphocytes Absolute 0 70 0 60 - 4 47 Thousands/µL    Monocytes Absolute 0 60 0 17 - 1 22 Thousand/µL    Eosinophils Absolute 0 10 0 00 - 0 61 Thousand/µL    Basophils Absolute 0 00 0 00 - 0 10 Thousands/µL   Basic metabolic panel    Collection Time: 11/14/17  5:17 AM   Result Value Ref Range    Sodium 144 136 - 145 mmol/L    Potassium 3 7 3 5 - 5 3 mmol/L    Chloride 110 (H) 100 - 108 mmol/L    CO2 26 21 - 32 mmol/L    Anion Gap 8 4 - 13 mmol/L    BUN 7 5 - 25 mg/dL    Creatinine 0 22 (L) 0 60 - 1 30 mg/dL    Glucose 95 65 - 140 mg/dL    Calcium 8 3 8 3 - 10 1 mg/dL    eGFR 167 ml/min/1 73sq m   Magnesium    Collection Time: 11/14/17  5:17 AM   Result Value Ref Range    Magnesium 2 3 1 6 - 2 6 mg/dL   Phosphorus    Collection Time: 11/14/17  5:17 AM   Result Value Ref Range    Phosphorus 3 2 2 7 - 4 5 mg/dL   Bronchial culture and Gram stain    Collection Time: 11/14/17 12:59 PM   Result Value Ref Range    Bronchial Culture 2+ Growth of      Gram Stain Result 4+ Polys     Gram Stain Result 2+ Gram positive cocci in pairs     Gram Stain Result 1+ Gram positive cocci in clusters    AFB Culture with Stain    Collection Time: 11/14/17 12:59 PM   Result Value Ref Range    AFB Stain No acid fast bacilli seen    CBC and Platelet    Collection Time: 11/15/17  4:24 AM   Result Value Ref Range    WBC 7 10 4 80 - 10 80 Thousand/uL    RBC 3 73 (L) 4 70 - 6 10 Million/uL    Hemoglobin 10 4 (L) 14 0 - 18 0 g/dL    Hematocrit 32 0 (L) 42 0 - 52 0 %    MCV 86 82 - 98 fL    MCH 27 8 27 0 - 31 0 pg    MCHC 32 5 31 4 - 37 4 g/dL    RDW 15 3 (H) 11 6 - 15 1 %    Platelets 646 511 - 893 Thousands/uL    MPV 7 8 (L) 8 9 - 12 7 fL   Basic metabolic panel    Collection Time: 11/15/17  4:24 AM   Result Value Ref Range    Sodium 146 (H) 136 - 145 mmol/L    Potassium 3 6 3 5 - 5 3 mmol/L    Chloride 111 (H) 100 - 108 mmol/L    CO2 29 21 - 32 mmol/L    Anion Gap 6 4 - 13 mmol/L    BUN 4 (L) 5 - 25 mg/dL    Creatinine <0 15 (L) 0 60 - 1 30 mg/dL    Glucose 79 65 - 140 mg/dL    Calcium 8 2 (L) 8 3 - 10 1 mg/dL    eGFR  ml/min/1 73sq m   Magnesium    Collection Time: 11/15/17  4:24 AM   Result Value Ref Range    Magnesium 1 8 1 6 - 2 6 mg/dL   Phosphorus    Collection Time: 11/15/17  4:24 AM   Result Value Ref Range    Phosphorus 2 7 2 7 - 4 5 mg/dL   Basic metabolic panel    Collection Time: 11/16/17  7:11 AM   Result Value Ref Range    Sodium 143 136 - 145 mmol/L    Potassium 4 4 3 5 - 5 3 mmol/L    Chloride 107 100 - 108 mmol/L    CO2 33 (H) 21 - 32 mmol/L    Anion Gap 3 (L) 4 - 13 mmol/L    BUN 3 (L) 5 - 25 mg/dL    Creatinine 0 17 (L) 0 60 - 1 30 mg/dL    Glucose 89 65 - 140 mg/dL    Calcium 8 4 8 3 - 10 1 mg/dL    eGFR 186 ml/min/1 73sq m   CBC and differential    Collection Time: 11/16/17  7:11 AM   Result Value Ref Range    WBC 6 00 4 80 - 10 80 Thousand/uL    RBC 3 98 (L) 4 70 - 6 10 Million/uL    Hemoglobin 11 0 (L) 14 0 - 18 0 g/dL    Hematocrit 34 2 (L) 42 0 - 52 0 %    MCV 86 82 - 98 fL    MCH 27 7 27 0 - 31 0 pg    MCHC 32 2 31 4 - 37 4 g/dL    RDW 15 5 (H) 11 6 - 15 1 %    MPV 6 9 (L) 8 9 - 12 7 fL    Platelets 963 000 - 221 Thousands/uL    nRBC 0 /100 WBCs    Neutrophils Relative 83 (H) 43 - 75 %    Lymphocytes Relative 7 (L) 14 - 44 %    Monocytes Relative 7 4 - 12 %    Eosinophils Relative 3 0 - 6 %    Basophils Relative 0 0 - 1 %    Neutrophils Absolute 5 00 1 85 - 7 62 Thousands/µL    Lymphocytes Absolute 0 40 (L) 0 60 - 4 47 Thousands/µL    Monocytes Absolute 0 40 0 17 - 1 22 Thousand/µL    Eosinophils Absolute 0 20 0 00 - 0 61 Thousand/µL Basophils Absolute 0 00 0 00 - 0 10 Thousands/µL       XR chest portable ICU   Final Result   Slight worsening of bilateral alveolar infiltrates  Workstation performed: [id number]         XR chest portable   Final Result      Improved aeration of the left lung with some residual left lower lobe infiltrate or atelectasis  There is no pneumothorax  Workstation performed: [id number]         XR chest portable   Final Result      Completely opacified left lung likely related to left lung atelectasis, perhaps related to mucous plugging  ##imslh##imslh         Workstation performed: [id number]         XR chest portable ICU   Final Result   1  Evolving left lower lobe infiltrate representing atelectasis or pneumonia  2   Low-lying endotracheal tube with tip projecting towards the left mainstem bronchus  Repositioning is recommended  3   Malpositioned orogastric tube with tip projecting into the EG junction  Repositioning recommended  ##sigslh##sigslh         Workstation performed: [id number]         XR chest portable   Final Result      ET tube tip above the hong  Workstation performed: [id number]         XR chest portable   Final Result      No active pulmonary disease  Workstation performed: [id number]         XR skull < 4 vw   Final Result      No radiopaque foreign body  Workstation performed: [id number]         MRI brain w wo contrast   Final Result   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      No focal brain parenchymal abnormalities      No acute intracranial hemorrhage or mass effect      No pathologic enhancement               Workstation performed: [id number]         XR chest 1 view portable   Final Result   Diminished inspiration  No active pulmonary disease           Workstation performed: [id number]         FL barium swallow video w speech    (Results Pending)       Invasive Devices Peripheral Intravenous Line            Peripheral IV 11/15/17 Right Forearm 1 day                  Results from last 7 days  Lab Units 11/14/17  1259 11/13/17  1432 11/13/17  0553 11/12/17  2107 11/12/17 2052   BLOOD CULTURE   --   --  No Growth at 72 hrs    No Growth at 72 hrs   --   --    SPUTUM CULTURE   --   --   --  2+ Growth of Moraxella catarrhalis*  3+ Growth of   --    GRAM STAIN RESULT  4+ Polys  2+ Gram positive cocci in pairs  1+ Gram positive cocci in clusters  --   --  2+ Polys  2+ Gram positive cocci in pairs  1+ Gram positive rods  1+ Gram negative rods  --    URINE CULTURE   --  10,000-19,000 cfu/ml Enterococcus faecalis*  --  >100,000 cfu/ml Enterococcus faecalis*  --    MRSA CULTURE ONLY   --   --   --   --  No Methicillin Resistant Staphlyococcus aureus (MRSA) isolated       Assessment:  Acute respiratory failure with hypoxia getting better  Aspiration pneumonia sputum showing Moraxella catarrhalis  Atelectasis  Urinary tract infection culture revealing Enterococcus faecalis  Frequent falls with head injury  Postconcussion syndrome  Severe intellectual disability  Chronic dysphagia      Plan:  Continue with IV antibiotics continue with IV fluids  Aspiration precautions  Discussed with the nursing staff  PT evaluation and treatment

## 2017-11-17 NOTE — RESPIRATORY THERAPY NOTE
ROSA Dunaway requested RT to evaluate pt  He was being fed and began to cough and sound very congested  I NT suctioned him for moderate amount clear secretions but lungs did not sound any better I was not able to advance sx catheter far enough  SpO2 95% on room air  I was told this also happened at lunch earlier in the day, it seems that pt  Is possibly aspirating while being fed   ROSA Dunaway said she will make Dr Fadumo Carmona aware

## 2017-11-17 NOTE — PROGRESS NOTES
Patient examined spoke with the nurse and the staff was watching him on one-to-one observation  Patient is resting in the bed not lethargic he gets anxious restless combative unpredictable inappropriate response to verbal command  Patient is suffering from mental retardation and psychosis he is taking his psychotropic medications  Patient is not lethargic no side effects of medications noted  I discussed with the nurse and the staff was watching him on one-to-one observation they reports that he does not keep his paints on and patient keep taking it off  No side effects of his all psychotropic medications he has been on lots of psychotropic medications for psychosis unable to reduce the medication  Medical treatment is in progress  Patient is unable to care for himself and he needs help with ADL care  Patient is not aware of his behavior and his condition due to mental limitations  I will continue his current medications the same as ordered and follow up  Thank you very much

## 2017-11-18 ENCOUNTER — APPOINTMENT (INPATIENT)
Dept: RADIOLOGY | Facility: HOSPITAL | Age: 57
DRG: 913 | End: 2017-11-18
Payer: MEDICARE

## 2017-11-18 LAB
ANION GAP SERPL CALCULATED.3IONS-SCNC: 4 MMOL/L (ref 4–13)
BACTERIA BLD CULT: NORMAL
BACTERIA BLD CULT: NORMAL
BASOPHILS # BLD AUTO: 0 THOUSANDS/ΜL (ref 0–0.1)
BASOPHILS NFR BLD AUTO: 0 % (ref 0–1)
BUN SERPL-MCNC: 6 MG/DL (ref 5–25)
CALCIUM SERPL-MCNC: 8.6 MG/DL (ref 8.3–10.1)
CHLORIDE SERPL-SCNC: 104 MMOL/L (ref 100–108)
CO2 SERPL-SCNC: 36 MMOL/L (ref 21–32)
CREAT SERPL-MCNC: 0.23 MG/DL (ref 0.6–1.3)
EOSINOPHIL # BLD AUTO: 0.2 THOUSAND/ΜL (ref 0–0.61)
EOSINOPHIL NFR BLD AUTO: 3 % (ref 0–6)
ERYTHROCYTE [DISTWIDTH] IN BLOOD BY AUTOMATED COUNT: 14.6 % (ref 11.6–15.1)
GFR SERPL CREATININE-BSD FRML MDRD: 164 ML/MIN/1.73SQ M
GLUCOSE SERPL-MCNC: 94 MG/DL (ref 65–140)
HCT VFR BLD AUTO: 32.9 % (ref 42–52)
HGB BLD-MCNC: 10.8 G/DL (ref 14–18)
LYMPHOCYTES # BLD AUTO: 0.6 THOUSANDS/ΜL (ref 0.6–4.47)
LYMPHOCYTES NFR BLD AUTO: 10 % (ref 14–44)
MCH RBC QN AUTO: 28 PG (ref 27–31)
MCHC RBC AUTO-ENTMCNC: 32.9 G/DL (ref 31.4–37.4)
MCV RBC AUTO: 85 FL (ref 82–98)
MONOCYTES # BLD AUTO: 0.5 THOUSAND/ΜL (ref 0.17–1.22)
MONOCYTES NFR BLD AUTO: 8 % (ref 4–12)
NEUTROPHILS # BLD AUTO: 4.7 THOUSANDS/ΜL (ref 1.85–7.62)
NEUTS SEG NFR BLD AUTO: 78 % (ref 43–75)
NRBC BLD AUTO-RTO: 0 /100 WBCS
PLATELET # BLD AUTO: 161 THOUSANDS/UL (ref 130–400)
PMV BLD AUTO: 6.9 FL (ref 8.9–12.7)
POTASSIUM SERPL-SCNC: 3.8 MMOL/L (ref 3.5–5.3)
RBC # BLD AUTO: 3.86 MILLION/UL (ref 4.7–6.1)
SODIUM SERPL-SCNC: 144 MMOL/L (ref 136–145)
WBC # BLD AUTO: 6.1 THOUSAND/UL (ref 4.8–10.8)

## 2017-11-18 PROCEDURE — 94760 N-INVAS EAR/PLS OXIMETRY 1: CPT

## 2017-11-18 PROCEDURE — 80048 BASIC METABOLIC PNL TOTAL CA: CPT | Performed by: INTERNAL MEDICINE

## 2017-11-18 PROCEDURE — 71010 HB CHEST X-RAY 1 VIEW FRONTAL (PORTABLE): CPT

## 2017-11-18 PROCEDURE — 85025 COMPLETE CBC W/AUTO DIFF WBC: CPT | Performed by: INTERNAL MEDICINE

## 2017-11-18 PROCEDURE — 94640 AIRWAY INHALATION TREATMENT: CPT

## 2017-11-18 RX ADMIN — DIVALPROEX SODIUM 750 MG: 250 TABLET, DELAYED RELEASE ORAL at 14:24

## 2017-11-18 RX ADMIN — LORAZEPAM 1 MG: 2 INJECTION INTRAMUSCULAR; INTRAVENOUS at 14:40

## 2017-11-18 RX ADMIN — LORAZEPAM 1 MG: 2 INJECTION INTRAMUSCULAR; INTRAVENOUS at 09:12

## 2017-11-18 RX ADMIN — AMPICILLIN SODIUM AND SULBACTAM SODIUM 1.5 G: 1; .5 INJECTION, POWDER, FOR SOLUTION INTRAMUSCULAR; INTRAVENOUS at 00:09

## 2017-11-18 RX ADMIN — ESCITALOPRAM OXALATE 20 MG: 10 TABLET ORAL at 11:29

## 2017-11-18 RX ADMIN — AMITRIPTYLINE HYDROCHLORIDE 25 MG: 10 TABLET, FILM COATED ORAL at 22:56

## 2017-11-18 RX ADMIN — AMPICILLIN SODIUM AND SULBACTAM SODIUM 1.5 G: 1; .5 INJECTION, POWDER, FOR SOLUTION INTRAMUSCULAR; INTRAVENOUS at 17:30

## 2017-11-18 RX ADMIN — IPRATROPIUM BROMIDE AND ALBUTEROL SULFATE 3 ML: .5; 3 SOLUTION RESPIRATORY (INHALATION) at 08:06

## 2017-11-18 RX ADMIN — ATORVASTATIN CALCIUM 20 MG: 20 TABLET, FILM COATED ORAL at 22:56

## 2017-11-18 RX ADMIN — ASPIRIN 81 MG 81 MG: 81 TABLET ORAL at 11:28

## 2017-11-18 RX ADMIN — DEXTROSE AND SODIUM CHLORIDE 60 ML/HR: 5; 450 INJECTION, SOLUTION INTRAVENOUS at 15:25

## 2017-11-18 RX ADMIN — DIVALPROEX SODIUM 750 MG: 250 TABLET, DELAYED RELEASE ORAL at 11:29

## 2017-11-18 RX ADMIN — CHLORHEXIDINE GLUCONATE 15 ML: 1.2 RINSE ORAL at 22:56

## 2017-11-18 RX ADMIN — ENOXAPARIN SODIUM 40 MG: 40 INJECTION SUBCUTANEOUS at 11:30

## 2017-11-18 RX ADMIN — IPRATROPIUM BROMIDE AND ALBUTEROL SULFATE 3 ML: .5; 3 SOLUTION RESPIRATORY (INHALATION) at 19:43

## 2017-11-18 RX ADMIN — IPRATROPIUM BROMIDE AND ALBUTEROL SULFATE 3 ML: .5; 3 SOLUTION RESPIRATORY (INHALATION) at 13:42

## 2017-11-18 RX ADMIN — AMPICILLIN SODIUM AND SULBACTAM SODIUM 1.5 G: 1; .5 INJECTION, POWDER, FOR SOLUTION INTRAMUSCULAR; INTRAVENOUS at 23:24

## 2017-11-18 RX ADMIN — AMPICILLIN SODIUM AND SULBACTAM SODIUM 1.5 G: 1; .5 INJECTION, POWDER, FOR SOLUTION INTRAMUSCULAR; INTRAVENOUS at 06:32

## 2017-11-18 RX ADMIN — ARIPIPRAZOLE 30 MG: 10 TABLET ORAL at 09:13

## 2017-11-18 RX ADMIN — CHLORHEXIDINE GLUCONATE 15 ML: 1.2 RINSE ORAL at 11:28

## 2017-11-18 RX ADMIN — AMPICILLIN SODIUM AND SULBACTAM SODIUM 1.5 G: 1; .5 INJECTION, POWDER, FOR SOLUTION INTRAMUSCULAR; INTRAVENOUS at 11:55

## 2017-11-18 RX ADMIN — Medication 1 TABLET: at 22:56

## 2017-11-18 RX ADMIN — QUETIAPINE FUMARATE 100 MG: 100 TABLET ORAL at 22:57

## 2017-11-18 RX ADMIN — POLYETHYLENE GLYCOL 3350 17 G: 17 POWDER, FOR SOLUTION ORAL at 11:30

## 2017-11-18 RX ADMIN — DIVALPROEX SODIUM 250 MG: 250 TABLET, DELAYED RELEASE ORAL at 22:56

## 2017-11-18 NOTE — PROGRESS NOTES
Progress Note - Pulmonary   Breanne Agent 61 y o  male MRN: [id number]  Unit/Bed#: [address] [place] [address] Encounter: [id number]    1   Acute hypoxemic respiratory failure requiring mechanical ventilation however he self extubated on 11/13/2017  He had mucous plugging on 11/14/2017 which resulted in complete left lung collapse   Bronchoscopy was performed on 11/14/2017 and airway secretions were suctioned  Continue with  nebulizer therapy, aggressive suctioning, vest therapy  Discontinue mucomyst     2   Chronic dysphagia-concern for aspiration currently NPO  Aspiration precautions     3  Prudy Asai in his sputum and UTI with Enterococcus   Moraxella as beta lactamase producing organism therefore  switch him to Unasyn  Urine sensitivities show pansensitivity  Treat with Unasyn for a total of 5 days  Today is day 4       4   Hypernatremia-resolved     5  Urinary retention- dill placed       Other issues include:    Profound mental retardation  Hyperlipidemia  ______________________________________________________________________    Subjective: Pt seen and examined at bedside  some secretions noted and suctioned by me  Periods of agitation  Tele Events:     Vitals:   Temp:  [96 8 °F (36 °C)-98 7 °F (37 1 °C)] 96 8 °F (36 °C)  HR:  [] 88  Resp:  [18] 18  BP: (120-154)/(65-73) 120/65  Weight (last 2 days)     Date/Time   Weight    11/18/17 0600  48 8 (107 58)    11/17/17 0600  48 8 (107 58)    11/16/17 0345  47 5 (104 72)            SpO2: SpO2: 96 %, SpO2 Device: O2 Device: None (Room air)    IV Infusions:    dextrose 5 % and sodium chloride 0 45 % 60 mL/hr Last Rate: 60 mL/hr (11/17/17 2209)       Nutrition:        Diet Orders            Start     Ordered    11/18/17 0938  Diet Dysphagia/Modified Consistency;  Dysphagia 1-Pureed; Pudding Thick Liquid  Diet effective now     Question Answer Comment   Diet Type Dysphagia/Modified Consistency    Dysphagia/Modified Consistency Dysphagia 1-Pureed    Liquid Modifier Pudding Thick Liquid    RD to adjust diet per protocol? Yes        11/18/17 1829    11/16/17 1122  Dietary nutrition supplements  Once     Question Answer Comment   Select Supplement: Ensure Pudding-Vanila    Frequency Breakfast, Lunch, Dinner        11/16/17 1122 11/08/17 1752  Room Service  Once     Question:  Type of Service  Answer:  Room Service- Not Appropriate    11/08/17 1751          Ins/Outs:   I/O       11/16 0701 - 11/17 0700 11/17 0701 - 11/18 0700 11/18 0701 - 11/19 0700    P  O  720      IV Piggyback       Total Intake(mL/kg) 720 (14 8)      Urine (mL/kg/hr) 2000 (1 7) 2250 (1 9)     Total Output 2000 2250      Net -1280 -2250             Unmeasured Urine Occurrence 2 x            Lines/Drains:  Invasive Devices     Peripheral Intravenous Line            Peripheral IV 11/15/17 Right Forearm 2 days          Drain            Urethral Catheter Latex 14 Fr  less than 1 day                 Active medications:  Scheduled Meds:  amitriptyline 25 mg Oral HS   ampicillin-sulbactam 1 5 g Intravenous Q6H   ARIPiprazole 30 mg Oral Daily   aspirin 81 mg Oral Daily   atorvastatin 20 mg Oral HS   chlorhexidine 15 mL Swish & Spit Q12H [address]   divalproex sodium 250 mg Oral HS   divalproex sodium 750 mg Oral QAM   divalproex sodium 750 mg Oral After Lunch   enoxaparin 40 mg Subcutaneous Q24H SHERICE   escitalopram 20 mg Oral Daily   ipratropium-albuterol 3 mL Nebulization Q6H   polyethylene glycol 17 g Oral Daily   QUEtiapine 100 mg Oral HS   senna-docusate sodium 1 tablet Oral HS     PRN Meds:  LORazepam 1 mg Q4H PRN     ____________________________________________________________________    Physical Exam:  Gen: NAD  HEENT: MMM, neck is supple  CVS: S1 S2 no murmur appreciated  Resp: CTA  Abd:soft NT ND BS+  Ext: no edema  Neuro: no focal deficits      ____________________________________________________________________    Labs:   CBC:   Results from last 7 days  Lab Units 11/18/17  0707 11/16/17  0711 11/15/17  0424   WBC Thousand/uL 6 10 6 00 7 10   HEMOGLOBIN g/dL 10 8* 11 0* 10 4*   HEMATOCRIT % 32 9* 34 2* 32 0*   MCV fL 85 86 86   PLATELETS Thousands/uL 161 151 134     CMP:   Results from last 7 days  Lab Units 11/18/17  0707 11/16/17  0711 11/15/17  0424   SODIUM mmol/L 144 143 146*   POTASSIUM mmol/L 3 8 4 4 3 6   CHLORIDE mmol/L 104 107 111*   CO2 mmol/L 36* 33* 29   BUN mg/dL 6 3* 4*   CREATININE mg/dL 0 23* 0 17* <0 15*   GLUCOSE RANDOM mg/dL 94 89 79   CALCIUM mg/dL 8 6 8 4 8 2*   EGFR ml/min/1 73sq m 164 186  --      Magnesium:   Results from last 7 days  Lab Units 11/15/17  0424   MAGNESIUM mg/dL 1 8     Phosphorous:   Results from last 7 days  Lab Units 11/15/17  0424   PHOSPHORUS mg/dL 2 7     Troponin:     PT/INR:     Lactic Acid:   Results from last 7 days  Lab Units 11/12/17  2107   LACTIC ACID mmol/L 1 4     BNP:     TSH:         Imaging: chest imaging today shows poor inspiration  No infiltrates  ??large amount of stool in abd  Official read is pending  This is read as by me      Micro: Lab Results   Component Value Date    BLOODCX No Growth After 4 Days  11/13/2017    BLOODCX No Growth After 4 Days  11/13/2017    URINECX 10,000-19,000 cfu/ml Enterococcus faecalis (A) 11/13/2017    URINECX >100,000 cfu/ml Enterococcus faecalis (A) 11/12/2017    SPUTUMCULTUR 2+ Growth of Moraxella catarrhalis (A) 11/12/2017    SPUTUMCULTUR 3+ Growth of  11/12/2017    MRSACULTURE  11/12/2017     No Methicillin Resistant Staphlyococcus aureus (MRSA) isolated    BRONCHIALCUL 2+ Growth of  11/14/2017      No results for input(s): INFLUAPCR, INFLUBPCR, RSVPCR in the last 72 hours      Invalid input(s): LEGIONELLAURINARYANTIGEN        Code Status: Level 1 - Full Code

## 2017-11-18 NOTE — PROGRESS NOTES
Progress Note - Regina Lawson 61 y o  male MRN: [id number]    Unit/Bed#: [address] [place] [address] Encounter: [id number]        Subjective:   Chart reviewed patient evaluated  Patient is still getting very agitated at times  Discussed with the nursing staff  Patient currently not on oxygen pulse oximetry is 94%  Labs noted BUN is 6 creatinine 0 23 sodium was 144  Review of Systems    Objective:     Vitals: Blood pressure 120/65, pulse 88, temperature (!) 96 8 °F (36 °C), temperature source Axillary, resp  rate 18, height 5' 3" (1 6 m), weight 48 8 kg (107 lb 9 4 oz), SpO2 94 %  ,Body mass index is 19 06 kg/m²        Intake/Output Summary (Last 24 hours) at 11/18/17 0941  Last data filed at 11/18/17 4909   Gross per 24 hour   Intake                0 ml   Output             1600 ml   Net            -1600 ml         Current Facility-Administered Medications:     amitriptyline (ELAVIL) tablet 25 mg, 25 mg, Oral, HS, Aicha Durán MD, 25 mg at 11/17/17 2213    ampicillin-sulbactam (UNASYN) 1 5 g in sodium chloride 0 9 % 50 mL IVPB, 1 5 g, Intravenous, Q6H, Shivani Scott MD, Last Rate: 100 mL/hr at 11/18/17 7572, 1 5 g at 11/18/17 7187    ARIPiprazole (ABILIFY) tablet 30 mg, 30 mg, Oral, Daily, Johnathon Balderas MD, 30 mg at 11/18/17 0913    aspirin chewable tablet 81 mg, 81 mg, Oral, Daily, Luisa Olivera PA-C, 81 mg at 11/17/17 1008    atorvastatin (LIPITOR) tablet 20 mg, 20 mg, Oral, HS, Johnathon Balderas MD, 20 mg at 11/17/17 2214    chlorhexidine (PERIDEX) 0 12 % oral rinse 15 mL, 15 mL, Swish & Spit, Q12H [address], Luisa Olivera PA-C, 15 mL at 11/17/17 2213    dextrose 5 % and sodium chloride 0 45 % infusion, 60 mL/hr, Intravenous, Continuous, Johnathon Balderas MD, Last Rate: 60 mL/hr at 11/17/17 2209, 60 mL/hr at 11/17/17 2209    divalproex sodium (DEPAKOTE) EC tablet 250 mg, 250 mg, Oral, HS, Johnathon Balderas MD, 250 mg at 11/17/17 2234    divalproex sodium (DEPAKOTE) EC tablet 750 mg, 750 mg, Oral, QAM, Johnathon Rodrigez MD, 750 mg at 11/17/17 1007    divalproex sodium (DEPAKOTE) EC tablet 750 mg, 750 mg, Oral, After Lunch, Johnathon Balderas MD, 750 mg at 11/17/17 1451    enoxaparin (LOVENOX) subcutaneous injection 40 mg, 40 mg, Subcutaneous, Q24H [address], Karla Reynoso MD, 40 mg at 11/17/17 1020    escitalopram (LEXAPRO) tablet 20 mg, 20 mg, Oral, Daily, Johnathon Balderas MD, 20 mg at 11/17/17 1008    ipratropium-albuterol (DUO-NEB) 0 5-2 5 mg/mL inhalation solution 3 mL, 3 mL, Nebulization, Q6H, Susan Sultana PA-C, 3 mL at 11/18/17 0806    LORazepam (ATIVAN) 2 mg/mL injection 1 mg, 1 mg, Intravenous, Q4H PRN, Johnathon Balderas MD, 1 mg at 11/18/17 0912    polyethylene glycol (MIRALAX) packet 17 g, 17 g, Oral, Daily, Karla Reynoso MD, 17 g at 11/17/17 1020    QUEtiapine (SEROquel) tablet 100 mg, 100 mg, Oral, HS, Johnathon Balderas MD, 100 mg at 11/17/17 2214    senna-docusate sodium (SENOKOT S) 8 6-50 mg per tablet 1 tablet, 1 tablet, Oral, HS, Karla Reynoso MD, 1 tablet at 11/17/17 2214     Physical Exam   Constitutional:   Confused and disoriented periods of agitation   HENT:   Patient has ecchymotic areas on the forehead and also on the back of the head   Eyes: Conjunctivae are normal  Pupils are equal, round, and reactive to light  Neck: Neck supple  No thyromegaly present  Cardiovascular: Normal rate and regular rhythm  Pulmonary/Chest: No respiratory distress  He exhibits no tenderness  Transmitted sounds present   Abdominal: Soft  Bowel sounds are normal    Musculoskeletal: Normal range of motion  He exhibits no edema or deformity  Neurological: He is alert  Periods of agitation   Skin: Skin is warm and dry             Lab, Imaging and culture:     Recent Results (from the past 72 hour(s))   ECG 12 lead    Collection Time: 11/15/17 10:56 AM   Result Value Ref Range    Ventricular Rate 97 BPM    Atrial Rate 97 BPM    MA Interval 120 ms    QRSD Interval 84 ms    QT Interval 380 ms QTC Interval 482 ms    P [place] 42 degrees    QRS Axis -73 degrees    T Wave Axis 63 degrees   Basic metabolic panel    Collection Time: 11/16/17  7:11 AM   Result Value Ref Range    Sodium 143 136 - 145 mmol/L    Potassium 4 4 3 5 - 5 3 mmol/L    Chloride 107 100 - 108 mmol/L    CO2 33 (H) 21 - 32 mmol/L    Anion Gap 3 (L) 4 - 13 mmol/L    BUN 3 (L) 5 - 25 mg/dL    Creatinine 0 17 (L) 0 60 - 1 30 mg/dL    Glucose 89 65 - 140 mg/dL    Calcium 8 4 8 3 - 10 1 mg/dL    eGFR 186 ml/min/1 73sq m   CBC and differential    Collection Time: 11/16/17  7:11 AM   Result Value Ref Range    WBC 6 00 4 80 - 10 80 Thousand/uL    RBC 3 98 (L) 4 70 - 6 10 Million/uL    Hemoglobin 11 0 (L) 14 0 - 18 0 g/dL    Hematocrit 34 2 (L) 42 0 - 52 0 %    MCV 86 82 - 98 fL    MCH 27 7 27 0 - 31 0 pg    MCHC 32 2 31 4 - 37 4 g/dL    RDW 15 5 (H) 11 6 - 15 1 %    MPV 6 9 (L) 8 9 - 12 7 fL    Platelets 847 622 - 303 Thousands/uL    nRBC 0 /100 WBCs    Neutrophils Relative 83 (H) 43 - 75 %    Lymphocytes Relative 7 (L) 14 - 44 %    Monocytes Relative 7 4 - 12 %    Eosinophils Relative 3 0 - 6 %    Basophils Relative 0 0 - 1 %    Neutrophils Absolute 5 00 1 85 - 7 62 Thousands/µL    Lymphocytes Absolute 0 40 (L) 0 60 - 4 47 Thousands/µL    Monocytes Absolute 0 40 0 17 - 1 22 Thousand/µL    Eosinophils Absolute 0 20 0 00 - 0 61 Thousand/µL    Basophils Absolute 0 00 0 00 - 0 10 Thousands/µL   Basic metabolic panel    Collection Time: 11/18/17  7:07 AM   Result Value Ref Range    Sodium 144 136 - 145 mmol/L    Potassium 3 8 3 5 - 5 3 mmol/L    Chloride 104 100 - 108 mmol/L    CO2 36 (H) 21 - 32 mmol/L    Anion Gap 4 4 - 13 mmol/L    BUN 6 5 - 25 mg/dL    Creatinine 0 23 (L) 0 60 - 1 30 mg/dL    Glucose 94 65 - 140 mg/dL    Calcium 8 6 8 3 - 10 1 mg/dL    eGFR 164 ml/min/1 73sq m   CBC and differential    Collection Time: 11/18/17  7:07 AM   Result Value Ref Range    WBC 6 10 4 80 - 10 80 Thousand/uL    RBC 3 86 (L) 4 70 - 6 10 Million/uL    Hemoglobin 10 8 (L) 14 0 - 18 0 g/dL    Hematocrit 32 9 (L) 42 0 - 52 0 %    MCV 85 82 - 98 fL    MCH 28 0 27 0 - 31 0 pg    MCHC 32 9 31 4 - 37 4 g/dL    RDW 14 6 11 6 - 15 1 %    MPV 6 9 (L) 8 9 - 12 7 fL    Platelets 385 643 - 433 Thousands/uL    nRBC 0 /100 WBCs    Neutrophils Relative 78 (H) 43 - 75 %    Lymphocytes Relative 10 (L) 14 - 44 %    Monocytes Relative 8 4 - 12 %    Eosinophils Relative 3 0 - 6 %    Basophils Relative 0 0 - 1 %    Neutrophils Absolute 4 70 1 85 - 7 62 Thousands/µL    Lymphocytes Absolute 0 60 0 60 - 4 47 Thousands/µL    Monocytes Absolute 0 50 0 17 - 1 22 Thousand/µL    Eosinophils Absolute 0 20 0 00 - 0 61 Thousand/µL    Basophils Absolute 0 00 0 00 - 0 10 Thousands/µL       FL barium swallow video w speech   Final Result      Large residuals in the valleculae and piriform sinuses  No evidence of laryngeal penetration or aspiration  Please see the Speech Therapist's report for a full description of findings and recommendations  Workstation performed: [id number]         XR chest portable ICU   Final Result   Slight worsening of bilateral alveolar infiltrates  Workstation performed: [id number]         XR chest portable   Final Result      Improved aeration of the left lung with some residual left lower lobe infiltrate or atelectasis  There is no pneumothorax  Workstation performed: [id number]         XR chest portable   Final Result      Completely opacified left lung likely related to left lung atelectasis, perhaps related to mucous plugging  ##[email or web address]##imslh         Workstation performed: [id number]         XR chest portable ICU   Final Result   1  Evolving left lower lobe infiltrate representing atelectasis or pneumonia  2   Low-lying endotracheal tube with tip projecting towards the left mainstem bronchus  Repositioning is recommended  3   Malpositioned orogastric tube with tip projecting into the EG junction  Repositioning recommended  ##[email or web address]##sigslh         Workstation performed: [id number]         XR chest portable   Final Result      ET tube tip above the hong  Workstation performed: [id number]         XR chest portable   Final Result      No active pulmonary disease  Workstation performed: [id number]         XR skull < 4 vw   Final Result      No radiopaque foreign body  Workstation performed: [id number]         MRI brain w wo contrast   Final Result   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      No focal brain parenchymal abnormalities      No acute intracranial hemorrhage or mass effect      No pathologic enhancement               Workstation performed: [id number]         XR chest 1 view portable   Final Result   Diminished inspiration  No active pulmonary disease  Workstation performed: [id number]         XR chest portable    (Results Pending)       Invasive Devices     Peripheral Intravenous Line            Peripheral IV 11/15/17 Right Forearm 2 days          Drain            Urethral Catheter Latex 14 Fr  less than 1 day                  Results from last 7 days  Lab Units 11/14/17  1259 11/13/17  1432 11/13/17  0553 11/12/17  2107 11/12/17 2052   BLOOD CULTURE   --   --  No Growth After 4 Days  No Growth After 4 Days    --   --    SPUTUM CULTURE   --   --   --  2+ Growth of Moraxella catarrhalis*  3+ Growth of   --    GRAM STAIN RESULT  4+ Polys  2+ Gram positive cocci in pairs  1+ Gram positive cocci in clusters  --   --  2+ Polys  2+ Gram positive cocci in pairs  1+ Gram positive rods  1+ Gram negative rods  --    URINE CULTURE   --  10,000-19,000 cfu/ml Enterococcus faecalis*  --  >100,000 cfu/ml Enterococcus faecalis*  --    MRSA CULTURE ONLY   --   --   --   --  No Methicillin Resistant Staphlyococcus aureus (MRSA) isolated       Assessment:  Acute respiratory failure with hypoxia getting better  Aspiration pneumonia sputum showing Moraxella catarrhalis  Atelectasis  Urinary tract infection culture revealing Enterococcus faecalis  Frequent falls with head injury  Postconcussion syndrome  Severe intellectual disability  Chronic dysphagia  Urinary retention currently has a Lundberg catheter being followed by the urologist     Plan:  Continue with IV antibiotics continue with IV fluids  Aspiration precautions  Start diet pureed with pudding thick liquids with aspiration precautions    IV fluids  Discussed with the nursing staff  PT evaluation and treatment  Chest x-ray today follow-up pneumonia

## 2017-11-18 NOTE — PROGRESS NOTES
Patient examined spoke with the nurse patient remains same but he stable at his baseline with current psychotropic medications  Patient is on lots of psychotropic medications multiple medications and he needs that  Patient is suffering from mental retardation dementia and psychosis nurse reports that he is a total care he gets agitated kicking and combative during the care  And I reviewed his psychotropic medications  No side effects of medications noted discussed with the nurse  She reports that he is eating and taking his medications  Patient is manageable  Medical evaluation and treatment is in progress  I will continue his current psychotropic medications as ordered and follow up  Thank you very much

## 2017-11-19 LAB
ALBUMIN SERPL BCP-MCNC: 2 G/DL (ref 3.5–5)
ALP SERPL-CCNC: 113 U/L (ref 46–116)
ALT SERPL W P-5'-P-CCNC: 58 U/L (ref 12–78)
ANION GAP SERPL CALCULATED.3IONS-SCNC: 5 MMOL/L (ref 4–13)
AST SERPL W P-5'-P-CCNC: 50 U/L (ref 5–45)
BILIRUB SERPL-MCNC: 0.3 MG/DL (ref 0.2–1)
BUN SERPL-MCNC: 8 MG/DL (ref 5–25)
CALCIUM SERPL-MCNC: 8.4 MG/DL (ref 8.3–10.1)
CHLORIDE SERPL-SCNC: 105 MMOL/L (ref 100–108)
CO2 SERPL-SCNC: 35 MMOL/L (ref 21–32)
CREAT SERPL-MCNC: 0.17 MG/DL (ref 0.6–1.3)
GFR SERPL CREATININE-BSD FRML MDRD: 186 ML/MIN/1.73SQ M
GLUCOSE SERPL-MCNC: 96 MG/DL (ref 65–140)
POTASSIUM SERPL-SCNC: 4.1 MMOL/L (ref 3.5–5.3)
PROT SERPL-MCNC: 5.9 G/DL (ref 6.4–8.2)
SODIUM SERPL-SCNC: 145 MMOL/L (ref 136–145)

## 2017-11-19 PROCEDURE — 94760 N-INVAS EAR/PLS OXIMETRY 1: CPT

## 2017-11-19 PROCEDURE — 94640 AIRWAY INHALATION TREATMENT: CPT

## 2017-11-19 PROCEDURE — 80053 COMPREHEN METABOLIC PANEL: CPT | Performed by: INTERNAL MEDICINE

## 2017-11-19 RX ADMIN — Medication 1 TABLET: at 21:47

## 2017-11-19 RX ADMIN — IPRATROPIUM BROMIDE AND ALBUTEROL SULFATE 3 ML: .5; 3 SOLUTION RESPIRATORY (INHALATION) at 07:55

## 2017-11-19 RX ADMIN — AMPICILLIN SODIUM AND SULBACTAM SODIUM 1.5 G: 1; .5 INJECTION, POWDER, FOR SOLUTION INTRAMUSCULAR; INTRAVENOUS at 12:48

## 2017-11-19 RX ADMIN — POLYETHYLENE GLYCOL 3350 17 G: 17 POWDER, FOR SOLUTION ORAL at 09:34

## 2017-11-19 RX ADMIN — LORAZEPAM 1 MG: 2 INJECTION INTRAMUSCULAR; INTRAVENOUS at 06:31

## 2017-11-19 RX ADMIN — QUETIAPINE FUMARATE 100 MG: 100 TABLET ORAL at 21:47

## 2017-11-19 RX ADMIN — ESCITALOPRAM OXALATE 20 MG: 10 TABLET ORAL at 09:33

## 2017-11-19 RX ADMIN — IPRATROPIUM BROMIDE AND ALBUTEROL SULFATE 3 ML: .5; 3 SOLUTION RESPIRATORY (INHALATION) at 14:40

## 2017-11-19 RX ADMIN — AMPICILLIN SODIUM AND SULBACTAM SODIUM 1.5 G: 1; .5 INJECTION, POWDER, FOR SOLUTION INTRAMUSCULAR; INTRAVENOUS at 06:20

## 2017-11-19 RX ADMIN — DEXTROSE AND SODIUM CHLORIDE 60 ML/HR: 5; 450 INJECTION, SOLUTION INTRAVENOUS at 13:56

## 2017-11-19 RX ADMIN — ENOXAPARIN SODIUM 40 MG: 40 INJECTION SUBCUTANEOUS at 09:32

## 2017-11-19 RX ADMIN — ATORVASTATIN CALCIUM 20 MG: 20 TABLET, FILM COATED ORAL at 21:48

## 2017-11-19 RX ADMIN — IPRATROPIUM BROMIDE AND ALBUTEROL SULFATE 3 ML: .5; 3 SOLUTION RESPIRATORY (INHALATION) at 01:51

## 2017-11-19 RX ADMIN — IPRATROPIUM BROMIDE AND ALBUTEROL SULFATE 3 ML: .5; 3 SOLUTION RESPIRATORY (INHALATION) at 19:44

## 2017-11-19 RX ADMIN — AMPICILLIN SODIUM AND SULBACTAM SODIUM 1.5 G: 1; .5 INJECTION, POWDER, FOR SOLUTION INTRAMUSCULAR; INTRAVENOUS at 18:46

## 2017-11-19 RX ADMIN — DIVALPROEX SODIUM 750 MG: 250 TABLET, DELAYED RELEASE ORAL at 09:33

## 2017-11-19 RX ADMIN — LORAZEPAM 1 MG: 2 INJECTION INTRAMUSCULAR; INTRAVENOUS at 13:09

## 2017-11-19 RX ADMIN — CHLORHEXIDINE GLUCONATE 15 ML: 1.2 RINSE ORAL at 09:32

## 2017-11-19 RX ADMIN — DIVALPROEX SODIUM 250 MG: 250 TABLET, DELAYED RELEASE ORAL at 21:48

## 2017-11-19 RX ADMIN — CHLORHEXIDINE GLUCONATE 15 ML: 1.2 RINSE ORAL at 21:49

## 2017-11-19 RX ADMIN — AMPICILLIN SODIUM AND SULBACTAM SODIUM 1.5 G: 1; .5 INJECTION, POWDER, FOR SOLUTION INTRAMUSCULAR; INTRAVENOUS at 23:53

## 2017-11-19 RX ADMIN — ASPIRIN 81 MG 81 MG: 81 TABLET ORAL at 09:33

## 2017-11-19 RX ADMIN — DIVALPROEX SODIUM 750 MG: 250 TABLET, DELAYED RELEASE ORAL at 16:24

## 2017-11-19 RX ADMIN — AMITRIPTYLINE HYDROCHLORIDE 25 MG: 10 TABLET, FILM COATED ORAL at 21:48

## 2017-11-19 RX ADMIN — ARIPIPRAZOLE 30 MG: 10 TABLET ORAL at 09:34

## 2017-11-19 NOTE — PROGRESS NOTES
Progress Note - Amanda Humphreys 61 y o  male MRN: [id number]    Unit/Bed#: [address] [place] [address] Encounter: [id number]        Subjective:   Patient is still having cough congestion symptoms whenever he is being fed  Discussed with the nursing staff  No shortness of breath chest x-ray shows improvement  Pulse oximetry is 96% on room air  Patient still has Lnudberg catheter which has been draining  Speech therapist note appreciated      Review of Systems    Objective:     Vitals: Blood pressure 110/64, pulse 95, temperature (!) 97 4 °F (36 3 °C), temperature source Tympanic, resp  rate 18, height 5' 3" (1 6 m), weight 48 6 kg (107 lb 2 3 oz), SpO2 96 %  ,Body mass index is 18 98 kg/m²        Intake/Output Summary (Last 24 hours) at 11/19/17 1517  Last data filed at 11/19/17 1359   Gross per 24 hour   Intake             2120 ml   Output             1725 ml   Net              395 ml         Current Facility-Administered Medications:     amitriptyline (ELAVIL) tablet 25 mg, 25 mg, Oral, HS, Alpesh Suarez MD, 25 mg at 11/18/17 2256    ampicillin-sulbactam (UNASYN) 1 5 g in sodium chloride 0 9 % 50 mL IVPB, 1 5 g, Intravenous, Q6H, Jacqueline Paul MD, Last Rate: 100 mL/hr at 11/19/17 1248, 1 5 g at 11/19/17 1248    ARIPiprazole (ABILIFY) tablet 30 mg, 30 mg, Oral, Daily, Johnathon Balderas MD, 30 mg at 11/19/17 0934    aspirin chewable tablet 81 mg, 81 mg, Oral, Daily, Luisa Olivera PA-C, 81 mg at 11/19/17 0933    atorvastatin (LIPITOR) tablet 20 mg, 20 mg, Oral, HS, Johnathon Balderas MD, 20 mg at 11/18/17 2256    chlorhexidine (PERIDEX) 0 12 % oral rinse 15 mL, 15 mL, Swish & Spit, Q12H [facility], Lusia Olivera PA-C, 15 mL at 11/19/17 0932    dextrose 5 % and sodium chloride 0 45 % infusion, 60 mL/hr, Intravenous, Continuous, Johnathon Balderas MD, Last Rate: 60 mL/hr at 11/19/17 1356, 60 mL/hr at 11/19/17 1356    divalproex sodium (DEPAKOTE) EC tablet 250 mg, 250 mg, Oral, HS, Johnathon Balderas MD, 250 mg at 11/18/17 7434   divalproex sodium (DEPAKOTE) EC tablet 750 mg, 750 mg, Oral, QAM, Johnathon Balderas MD, 750 mg at 11/19/17 0933    divalproex sodium (DEPAKOTE) EC tablet 750 mg, 750 mg, Oral, After Lunch, Johnathon Balderas MD, 750 mg at 11/18/17 1424    enoxaparin (LOVENOX) subcutaneous injection 40 mg, 40 mg, Subcutaneous, Q24H [address], Olga Lidia Bryant MD, 40 mg at 11/19/17 0932    escitalopram (LEXAPRO) tablet 20 mg, 20 mg, Oral, Daily, Johnathon Balderas MD, 20 mg at 11/19/17 0933    ipratropium-albuterol (DUO-NEB) 0 5-2 5 mg/mL inhalation solution 3 mL, 3 mL, Nebulization, Q6H, Lizette Sheldon PA-C, 3 mL at 11/19/17 1440    LORazepam (ATIVAN) 2 mg/mL injection 1 mg, 1 mg, Intravenous, Q4H PRN, Johnathon Balderas MD, 1 mg at 11/19/17 1309    polyethylene glycol (MIRALAX) packet 17 g, 17 g, Oral, Daily, Olga Lidia Bryant MD, 17 g at 11/19/17 0934    QUEtiapine (SEROquel) tablet 100 mg, 100 mg, Oral, HS, Johnathon Balderas MD, 100 mg at 11/18/17 2257    senna-docusate sodium (SENOKOT S) 8 6-50 mg per tablet 1 tablet, 1 tablet, Oral, HS, Olga Lidia Bryant MD, 1 tablet at 11/18/17 2252     Physical Exam   Constitutional:   Confused and disoriented periods of agitation   HENT:   Patient has ecchymotic areas on the forehead and also on the back of the head   Eyes: Conjunctivae are normal  Pupils are equal, round, and reactive to light  Neck: Neck supple  No thyromegaly present  Cardiovascular: Normal rate and regular rhythm  Pulmonary/Chest: No respiratory distress  He exhibits no tenderness  Transmitted sounds present   Abdominal: Soft  Bowel sounds are normal    Musculoskeletal: Normal range of motion  He exhibits no edema or deformity  Neurological: He is alert  Periods of agitation   Skin: Skin is warm and dry             Lab, Imaging and culture:     Recent Results (from the past 72 hour(s))   Basic metabolic panel    Collection Time: 11/18/17  7:07 AM   Result Value Ref Range    Sodium 144 136 - 145 mmol/L    Potassium 3 8 3 5 - 5 3 mmol/L    Chloride 104 100 - 108 mmol/L    CO2 36 (H) 21 - 32 mmol/L    Anion Gap 4 4 - 13 mmol/L    BUN 6 5 - 25 mg/dL    Creatinine 0 23 (L) 0 60 - 1 30 mg/dL    Glucose 94 65 - 140 mg/dL    Calcium 8 6 8 3 - 10 1 mg/dL    eGFR 164 ml/min/1 73sq m   CBC and differential    Collection Time: 11/18/17  7:07 AM   Result Value Ref Range    WBC 6 10 4 80 - 10 80 Thousand/uL    RBC 3 86 (L) 4 70 - 6 10 Million/uL    Hemoglobin 10 8 (L) 14 0 - 18 0 g/dL    Hematocrit 32 9 (L) 42 0 - 52 0 %    MCV 85 82 - 98 fL    MCH 28 0 27 0 - 31 0 pg    MCHC 32 9 31 4 - 37 4 g/dL    RDW 14 6 11 6 - 15 1 %    MPV 6 9 (L) 8 9 - 12 7 fL    Platelets 979 641 - 476 Thousands/uL    nRBC 0 /100 WBCs    Neutrophils Relative 78 (H) 43 - 75 %    Lymphocytes Relative 10 (L) 14 - 44 %    Monocytes Relative 8 4 - 12 %    Eosinophils Relative 3 0 - 6 %    Basophils Relative 0 0 - 1 %    Neutrophils Absolute 4 70 1 85 - 7 62 Thousands/µL    Lymphocytes Absolute 0 60 0 60 - 4 47 Thousands/µL    Monocytes Absolute 0 50 0 17 - 1 22 Thousand/µL    Eosinophils Absolute 0 20 0 00 - 0 61 Thousand/µL    Basophils Absolute 0 00 0 00 - 0 10 Thousands/µL   Comprehensive metabolic panel    Collection Time: 11/19/17  6:35 AM   Result Value Ref Range    Sodium 145 136 - 145 mmol/L    Potassium 4 1 3 5 - 5 3 mmol/L    Chloride 105 100 - 108 mmol/L    CO2 35 (H) 21 - 32 mmol/L    Anion Gap 5 4 - 13 mmol/L    BUN 8 5 - 25 mg/dL    Creatinine 0 17 (L) 0 60 - 1 30 mg/dL    Glucose 96 65 - 140 mg/dL    Calcium 8 4 8 3 - 10 1 mg/dL    AST 50 (H) 5 - 45 U/L    ALT 58 12 - 78 U/L    Alkaline Phosphatase 113 46 - 116 U/L    Total Protein 5 9 (L) 6 4 - 8 2 g/dL    Albumin 2 0 (L) 3 5 - 5 0 g/dL    Total Bilirubin 0 30 0 20 - 1 00 mg/dL    eGFR 186 ml/min/1 73sq m       XR chest portable   Final Result      Limited study  Mild right basilar opacity representing atelectasis or developing pneumonia  Left basilar subsegmental atelectasis           Workstation performed: [id number]         FL barium swallow video w speech   Final Result      Large residuals in the valleculae and piriform sinuses  No evidence of laryngeal penetration or aspiration  Please see the Speech Therapist's report for a full description of findings and recommendations  Workstation performed: [id number]         XR chest portable ICU   Final Result   Slight worsening of bilateral alveolar infiltrates  Workstation performed: [id number]         XR chest portable   Final Result      Improved aeration of the left lung with some residual left lower lobe infiltrate or atelectasis  There is no pneumothorax  Workstation performed: [id number]         XR chest portable   Final Result      Completely opacified left lung likely related to left lung atelectasis, perhaps related to mucous plugging  ##[email or web address]##imslh         Workstation performed: [id number]         XR chest portable ICU   Final Result   1  Evolving left lower lobe infiltrate representing atelectasis or pneumonia  2   Low-lying endotracheal tube with tip projecting towards the left mainstem bronchus  Repositioning is recommended  3   Malpositioned orogastric tube with tip projecting into the EG junction  Repositioning recommended  ##sigslh##sigslh         Workstation performed: [id number]         XR chest portable   Final Result      ET tube tip above the hong  Workstation performed: [id number]         XR chest portable   Final Result      No active pulmonary disease  Workstation performed: [id number]         XR skull < 4 vw   Final Result      No radiopaque foreign body                  Workstation performed: [id number]         MRI brain w wo contrast   Final Result   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      No focal brain parenchymal abnormalities      No acute intracranial hemorrhage or mass effect      No pathologic enhancement               Workstation performed: [id number]         XR chest 1 view portable   Final Result   Diminished inspiration  No active pulmonary disease  Workstation performed: [id number]             Invasive Devices     Peripheral Intravenous Line            Peripheral IV 11/15/17 Right Forearm 4 days          Drain            Urethral Catheter Latex 14 Fr  2 days                  Results from last 7 days  Lab Units 11/14/17  1259 11/13/17  1432 11/13/17  0553 11/12/17  2107 11/12/17  2052   BLOOD CULTURE   --   --  No Growth After 5 Days  No Growth After 5 Days  --   --    SPUTUM CULTURE   --   --   --  2+ Growth of Moraxella catarrhalis*  3+ Growth of   --    GRAM STAIN RESULT  4+ Polys  2+ Gram positive cocci in pairs  1+ Gram positive cocci in clusters  --   --  2+ Polys  2+ Gram positive cocci in pairs  1+ Gram positive rods  1+ Gram negative rods  --    URINE CULTURE   --  10,000-19,000 cfu/ml Enterococcus faecalis*  --  >100,000 cfu/ml Enterococcus faecalis*  --    MRSA CULTURE ONLY   --   --   --   --  No Methicillin Resistant Staphlyococcus aureus (MRSA) isolated       Assessment:  Acute respiratory failure with hypoxia getting better  Aspiration pneumonia sputum showing Moraxella catarrhalis  Atelectasis  Urinary tract infection culture revealing Enterococcus faecalis  Frequent falls with head injury  Postconcussion syndrome  Severe intellectual disability  Chronic dysphagia concern for recurrent aspiration  Urinary retention currently has a Lundberg catheter being followed by the urologist     Plan:  Continue with IV antibiotics continue with IV fluids  Aspiration precautions  NPO except meds  IV fluids  Discussed with the nursing staff  PT evaluation and treatment  Will discuss with the guardian regarding alternate means of feeding

## 2017-11-19 NOTE — PROGRESS NOTES
Pt skin felt warm to touch at 1620  Took tympanic temp  Was 97 1  Took rectal tempt to be sure and temp was 98 1 rectally   Will continue to monitor

## 2017-11-19 NOTE — PROGRESS NOTES
Patient examined discussed with Dr Ozzie Copeland and spoke with the staff  Patient remains same mentally retarded demented psychotic he gets agitated inappropriate response to simple verbal command unable to give any meaningful information patient is getting agitated time to time and receiving the Ativan p r n  Cinthia Bush Patient is very difficult to redirect  Patient is a total care  Patient is also and aspiration risk medical evaluation and treatment is in progress  I reviewed his all psychotropic medications  Patient has been on lots of psychotropic medications Elavil Lexapro Depakote Abilify Seroquel and Ativan p r n  I will not give him more psychotropic medications to avoid the sedation and side effects  Patient is chronically psychotic and severe behavior problem he needs psychotropic medications to control his behavior  I will continue medications the same at this time patient will need psychiatric follow-up after the discharge  Spoke with the nurse  I will follow up  Thank you very much

## 2017-11-19 NOTE — SPEECH THERAPY NOTE
Speech Language Pathology  Dysphagia    Patient continues on a puree diet with pudding thickened liquids  Both aide and RN report that patient has significant difficulty swallowing with consistent coughing, suctioning, etc   RN observed difficulty swallowing current diet after lunch and dinner on 11/17, had RT assess who reported findings of probable aspiration  RN contacted MD who ordered NPO  Diet was resumed 11/18  Discussed current swallow skills/status with aide and RN  Patient continues with wet cough, suctioning  Based on discussion with RN, recommend NPO including medications

## 2017-11-20 PROBLEM — N30.00 ACUTE CYSTITIS WITHOUT HEMATURIA: Status: ACTIVE | Noted: 2017-11-08

## 2017-11-20 PROCEDURE — 92526 ORAL FUNCTION THERAPY: CPT

## 2017-11-20 PROCEDURE — 94760 N-INVAS EAR/PLS OXIMETRY 1: CPT

## 2017-11-20 PROCEDURE — 94640 AIRWAY INHALATION TREATMENT: CPT

## 2017-11-20 PROCEDURE — 97110 THERAPEUTIC EXERCISES: CPT

## 2017-11-20 RX ADMIN — AMITRIPTYLINE HYDROCHLORIDE 25 MG: 10 TABLET, FILM COATED ORAL at 21:46

## 2017-11-20 RX ADMIN — AMPICILLIN SODIUM AND SULBACTAM SODIUM 1.5 G: 1; .5 INJECTION, POWDER, FOR SOLUTION INTRAMUSCULAR; INTRAVENOUS at 17:41

## 2017-11-20 RX ADMIN — CHLORHEXIDINE GLUCONATE 15 ML: 1.2 RINSE ORAL at 21:46

## 2017-11-20 RX ADMIN — IPRATROPIUM BROMIDE AND ALBUTEROL SULFATE 3 ML: .5; 3 SOLUTION RESPIRATORY (INHALATION) at 08:35

## 2017-11-20 RX ADMIN — AMPICILLIN SODIUM AND SULBACTAM SODIUM 1.5 G: 1; .5 INJECTION, POWDER, FOR SOLUTION INTRAMUSCULAR; INTRAVENOUS at 05:45

## 2017-11-20 RX ADMIN — ASPIRIN 81 MG 81 MG: 81 TABLET ORAL at 09:03

## 2017-11-20 RX ADMIN — IPRATROPIUM BROMIDE AND ALBUTEROL SULFATE 3 ML: .5; 3 SOLUTION RESPIRATORY (INHALATION) at 14:06

## 2017-11-20 RX ADMIN — IPRATROPIUM BROMIDE AND ALBUTEROL SULFATE 3 ML: .5; 3 SOLUTION RESPIRATORY (INHALATION) at 20:38

## 2017-11-20 RX ADMIN — ENOXAPARIN SODIUM 40 MG: 40 INJECTION SUBCUTANEOUS at 09:03

## 2017-11-20 RX ADMIN — CHLORHEXIDINE GLUCONATE 15 ML: 1.2 RINSE ORAL at 09:04

## 2017-11-20 RX ADMIN — ARIPIPRAZOLE 30 MG: 10 TABLET ORAL at 09:04

## 2017-11-20 RX ADMIN — Medication 1 TABLET: at 21:56

## 2017-11-20 RX ADMIN — IPRATROPIUM BROMIDE AND ALBUTEROL SULFATE 3 ML: .5; 3 SOLUTION RESPIRATORY (INHALATION) at 01:30

## 2017-11-20 RX ADMIN — AMPICILLIN SODIUM AND SULBACTAM SODIUM 1.5 G: 1; .5 INJECTION, POWDER, FOR SOLUTION INTRAMUSCULAR; INTRAVENOUS at 11:48

## 2017-11-20 RX ADMIN — ATORVASTATIN CALCIUM 20 MG: 20 TABLET, FILM COATED ORAL at 21:46

## 2017-11-20 RX ADMIN — DEXTROSE AND SODIUM CHLORIDE 60 ML/HR: 5; 450 INJECTION, SOLUTION INTRAVENOUS at 09:00

## 2017-11-20 RX ADMIN — QUETIAPINE FUMARATE 100 MG: 100 TABLET ORAL at 21:46

## 2017-11-20 RX ADMIN — ESCITALOPRAM OXALATE 20 MG: 10 TABLET ORAL at 09:03

## 2017-11-20 RX ADMIN — DIVALPROEX SODIUM 750 MG: 250 TABLET, DELAYED RELEASE ORAL at 09:03

## 2017-11-20 RX ADMIN — DIVALPROEX SODIUM 250 MG: 250 TABLET, DELAYED RELEASE ORAL at 21:47

## 2017-11-20 RX ADMIN — POLYETHYLENE GLYCOL 3350 17 G: 17 POWDER, FOR SOLUTION ORAL at 09:04

## 2017-11-20 NOTE — SOCIAL WORK
SW following to monitor needs and assist with planning  Pt is a resident of [facility]  Plan [facility]  SW will follow to monitor progress and assist with transfer back when ready

## 2017-11-20 NOTE — CASE MANAGEMENT
Continued Stay Review    Date: 11/18/17    Vitals: Blood pressure 120/65, pulse 88, temperature (!) 96 8 °F (36 °C), temperature source Axillary, resp  rate 18, height 5' 3" (1 6 m), weight 48 8 kg (107 lb 9 4 oz), SpO2 94 %  ,Body mass index is 19 06 kg/m²  Medications:   Scheduled Meds:   amitriptyline 25 mg Oral HS   ampicillin-sulbactam 1 5 g Intravenous Q6H   ARIPiprazole 30 mg Oral Daily   aspirin 81 mg Oral Daily   atorvastatin 20 mg Oral HS   chlorhexidine 15 mL Swish & Spit Q12H [address]   divalproex sodium 250 mg Oral HS   divalproex sodium 750 mg Oral QAM   divalproex sodium 750 mg Oral After Lunch   enoxaparin 40 mg Subcutaneous Q24H SHERICE   escitalopram 20 mg Oral Daily   ipratropium-albuterol 3 mL Nebulization Q6H   polyethylene glycol 17 g Oral Daily   QUEtiapine 100 mg Oral HS   senna-docusate sodium 1 tablet Oral HS     Continuous Infusions:   dextrose 5 % and sodium chloride 0 45 % 60 mL/hr Last Rate: 60 mL/hr (11/20/17 0900)     PRN Meds: LORazepam    Abnormal Labs/Diagnostic Results:   Co2 36 CR 0 23 HGB 10 8  CXR=Limited study  Mild right basilar opacity representing atelectasis or developing pneumonia  Left basilar subsegmental atelectasis  Age/Sex: 61 y o  male     Assessment/Plan:   Acute respiratory failure with hypoxia getting better  Aspiration pneumonia sputum showing Moraxella catarrhalis  Atelectasis  Urinary tract infection culture revealing Enterococcus faecalis  Frequent falls with head injury  Postconcussion syndrome  Severe intellectual disability  Chronic dysphagia  Urinary retention currently has a Lundberg catheter being followed by the urologist      Plan:  Continue with IV antibiotics continue with IV fluids  Aspiration precautions  Start diet pureed with pudding thick liquids with aspiration precautions    IV fluids  Discussed with the nursing staff  PT evaluation and treatment  Chest x-ray today follow-up pneumonia    Discharge Plan: RETURN TO [facility]

## 2017-11-20 NOTE — PHYSICAL THERAPY NOTE
PT TREATMENT  Actual treatment time [id number]     11/20/17 1500   Pain Assessment   Pain Assessment Montejo-Baker FACES   Montejo-Baker FACES Pain Rating 0   Restrictions/Precautions   Other Precautions Cognitive; Chair Alarm; Bed Alarm; Fall Risk;1:1   General   Chart Reviewed Yes   Family/Caregiver Present No   Cognition   Arousal/Participation Cooperative   Subjective   Subjective mummbling, moaning at times no intelligible conversation   Bed Mobility   Supine to Sit 2  Maximal assistance   Additional items Assist x 1   Sit to Supine 2  Maximal assistance   Additional items Assist x 1   Transfers   Sit to Stand 2  Maximal assistance   Additional items Assist x 2   Stand to Sit 2  Maximal assistance   Additional items Assist x 2   Ambulation/Elevation   Gait Assistance 2  Maximal assist   Additional items Assist x 2   Assistive Device (handhold)   Distance 5 to 10 feet with change in direction and max assist for weight shiting to advance BLEs , patient unpredictable due to cognition and high fall risk    Exercises   Heelslides Supine;10 reps;AAROM; Bilateral   Heel Cord Stretch Supine;5 reps;PROM; Bilateral   Assessment   Assessment patient cooperative but high fall risk due to cognition and weakness  Patient will benefit from continued PT with progression as tolerated  Plan   Treatment/Interventions Functional transfer training;LE strengthening/ROM; Therapeutic exercise; Endurance training;Cognitive reorientation;Patient/family training;Bed mobility; Equipment eval/education;Gait training   PT Frequency (3-5x/week)   Recommendation   Recommendation (return to [facility] with PT)

## 2017-11-20 NOTE — NUTRITION
11/20/17 1033   Recommendations/Interventions   Summary Pt made NPO yesterday at 1523 d/t incessant coughing during meals (per chart and RN)  Recommend ST see pt today to re-evaluate  If unable to advance diet in next 24-48 hrs, consider alternate means of nutrition  Consult as needed for nutrition support recommendations  Interventions Diet: continued as ordered   Nutrition Recommendations Swallow evaluation; Other (specify)  (If unable to advance diet in next 24-48 hrs, consider alternate means of nutrition   Consult as needed for nutrition support recommendations )

## 2017-11-20 NOTE — PROGRESS NOTES
Progress Note - Urology   Germain Snell 61 y o  male MRN: [id number]  Unit/Bed#: [address] [place] [address] Encounter: [id number]    Assessment/Plan:  Urinary retention  Previous straight catheterization for 650 mL on 11/17/2017 and Dill subsequently placed 11/17/2017 for 1100 cc of amy urine  High risk of Dill trauma secondary to cognitive status  Patient dill current secured with tape and adhesive device  Recurring falls and head trauma  Not a candidate for tamsulosin  Failed voiding trial today  Unable to void and bladder scanned for around 900cc  · Replace with 14 Somali Dill catheter inflated with 3 cc in balloon  · Try for flexible cystoscopy tomorrow to evaluate for blockage  UTI  Enterococcus 10,000 - 19,000 colonies on culture  11/13/2017  Enterococcus greater than 100,000 colonies on culture 11/12/2017  Unasyn 1 5 g IV q 6 hours    · Cystoscopy under antibiotic coverage    Subjective:  Seen on rounds and notes the following:  Patient lying in bed and rolling around  Does not seem to have cause Dill trauma as of yet  Continues to be monitored by a one to one, and having mittens on to prevent him from grasping at the Dill catheter and IV  Objective:  Vitals Last 24 hours:   Temp:  [95 4 °F (35 2 °C)-98 1 °F (36 7 °C)] 97 2 °F (36 2 °C)  HR:  [71-95] 74  Resp:  [18] 18  BP: (110-150)/(60-80) 148/60  BMI: Body mass index is 18 94 kg/m²  Physical Exam   HENT:   Head: Normocephalic  Pulmonary/Chest: Effort normal    Abdominal: Soft  He exhibits no distension  Skin: Skin is warm  Drains:   Dill draining yellow urine      Lab Results and Cultures:     Results from last 7 days  Lab Units 11/19/17  0635 11/18/17  0707 11/16/17  0711 11/15/17  0424 11/14/17  0517   WBC Thousand/uL  --  6 10 6 00 7 10 8 70   RBC Million/uL  --  3 86* 3 98* 3 73* 4 00*   HEMOGLOBIN g/dL  --  10 8* 11 0* 10 4* 11 5*   HEMATOCRIT %  --  32 9* 34 2* 32 0* 34 7*   PLATELETS Thousands/uL  --  161 151 134 118*   POTASSIUM mmol/L 4 1 3 8 4 4 3 6 3 7   CHLORIDE mmol/L 105 104 107 111* 110*   CO2 mmol/L 35* 36* 33* 29 26   BUN mg/dL 8 6 3* 4* 7   CREATININE mg/dL 0 17* 0 23* 0 17* <0 15* 0 22*   GLUCOSE RANDOM mg/dL 96 94 89 79 95   PHOSPHORUS mg/dL  --   --   --  2 7 3 2   MAGNESIUM mg/dL  --   --   --  1 8 2 3   CALCIUM mg/dL 8 4 8 6 8 4 8 2* 8 3   AST U/L 50*  --   --   --   --    ALT U/L 58  --   --   --   --    ALK PHOS U/L 113  --   --   --   --    ALBUMIN g/dL 2 0*  --   --   --   --    BILIRUBIN TOTAL mg/dL 0 30  --   --   --   --    EGFR ml/min/1 73sq m 186 164 186  --  167                   Lab Results   Component Value Date    URINECX 10,000-19,000 cfu/ml Enterococcus faecalis (A) 11/13/2017         Intake/Output Summary (Last 24 hours) at 11/20/17 0855  Last data filed at 11/20/17 0406   Gross per 24 hour   Intake             1000 ml   Output             1075 ml   Net              -75 ml         Clyda Estimable, PA-C  11/20/2017    Portions of the record may have been created with voice recognition software   Occasional wrong word or "sound a like" substitutions may have occurred due to the inherent limitations of voice recognition software   Read the chart carefully and recognize, using context, where substitutions have occurred

## 2017-11-20 NOTE — PROGRESS NOTES
Patient examined spoke with the nurse patient remained same mentally challenged demented and psychotic unpredictable patient has periods of agitation and needs p r n  medication time to time patient is also on routine psychotropic medications  Patient has ongoing medical problems and he is and aspiration risk  Patient responding to a simple verbal command by raising his hand but no meaningful communication  Patient has been on one-to-one observation for the safety staff reports that patient is all right he is receiving his medications  Medical evaluation and treatment is in progress  Patient is on lots of psychotropic medications  Patient is not lethargic patient has a high tolerance to the medications  No side effects of medications noted  Patient is not suicidal but patient is not capable to take care of himself patient needs help with ADL care  Nurse reports that patient is more or less the same  I will continue his current psychotropic medications as ordered and follow up  Thank you very much

## 2017-11-20 NOTE — PLAN OF CARE
Problem: SLP ADULT - SWALLOWING, IMPAIRED  Goal: Advance to least restrictive diet without signs or symptoms of aspiration for planned discharge setting  See evaluation for individualized goals  Patient will:    1  Safely swallow pureed solids and honey thick liquids without s/s of aspiration or dysphagia 100% of time  2 weeks  LTG:  Safe swallow skills for nutrition and hydration needs on least restrictive diet consistency  2 weeks       Outcome: Not Progressing

## 2017-11-20 NOTE — PROGRESS NOTES
[address] [facility] 64 [address] 81 [facility] Progress Note - Tamiko Gonsalez 61 y o  male MRN: [id number]    Unit/Bed#: 2 [place] [address] Encounter: [id number]      Assessment/Plan:    Acute hypoxemic respiratory failure s/p self extubation on 11/13/2017:  Currently patient is on room air and saturating 96%  Bronchoscopy was performed on 11/14/2017 due to mucous plugging  Culture of bronchial secretion showed mixed respiratory goldie and sputum culture showed Moraxella catarrhalis  Patient is getting IV Unasyn, today is day# 6  Continue with nebulization, suctioning and Vest therapy  Chronic dysphagia:  Modified barium swallow was done on 11/17 which showed Large residuals in the valleculae and piriform sinuses but No evidence of laryngeal penetration or aspiration  Currently patient is NPO  Hypernatremia:  Resolved  Urinary retention:  Lundberg was discontinued today per Urology  Currently patient is on adult diaper  Mental retardation with psychosis:  Dr Selina Mahoney is following the patient  Subjective:   Patient is nonverbal   No acute overnight event  As per nurse  he brought up around 20-30 cc of secretions on oral suction  Objective:     Vitals: Blood pressure 148/60, pulse 74, temperature (!) 97 2 °F (36 2 °C), temperature source Axillary, resp  rate 18, height 5' 3" (1 6 m), weight 48 5 kg (106 lb 14 8 oz), SpO2 96 %  ,Body mass index is 18 94 kg/m²    Wt Readings from Last 3 Encounters:   11/20/17 48 5 kg (106 lb 14 8 oz)       Intake/Output Summary (Last 24 hours) at 11/20/17 0953  Last data filed at 11/20/17 0406   Gross per 24 hour   Intake             1000 ml   Output             1075 ml   Net              -75 ml       Physical Exam: General appearance: uncooperative and Nonverbal  Lungs: Course breath sounds, no crackles or wheezing  Heart: regular rate and rhythm, S1, S2 normal, no murmur, click, rub or gallop  Abdomen: soft, non-tender; bowel sounds normal; no masses,  no organomegaly  Extremities: extremities normal, atraumatic, no cyanosis or edema     No results found for this or any previous visit (from the past 24 hour(s))      Current Facility-Administered Medications   Medication Dose Route Frequency Provider Last Rate Last Dose    amitriptyline (ELAVIL) tablet 25 mg  25 mg Oral HS Lenin Kay MD   25 mg at 11/19/17 2148    ampicillin-sulbactam (UNASYN) 1 5 g in sodium chloride 0 9 % 50 mL IVPB  1 5 g Intravenous Q6H Zakia Conroy  mL/hr at 11/20/17 0545 1 5 g at 11/20/17 0545    ARIPiprazole (ABILIFY) tablet 30 mg  30 mg Oral Daily Johnathon Balderas MD   30 mg at 11/20/17 9479    aspirin chewable tablet 81 mg  81 mg Oral Daily JABARI Tao-C   81 mg at 11/20/17 2464    atorvastatin (LIPITOR) tablet 20 mg  20 mg Oral HS Johnathon Balderas MD   20 mg at 11/19/17 2148    chlorhexidine (PERIDEX) 0 12 % oral rinse 15 mL  15 mL Swish & Spit Q12H [address], PA-C   15 mL at 11/20/17 7716    dextrose 5 % and sodium chloride 0 45 % infusion  60 mL/hr Intravenous Continuous Johnathon Balderas MD 60 mL/hr at 11/19/17 1356 60 mL/hr at 11/19/17 1356    divalproex sodium (DEPAKOTE) EC tablet 250 mg  250 mg Oral HS Johnathon Balderas MD   250 mg at 11/19/17 2148    divalproex sodium (DEPAKOTE) EC tablet 750 mg  750 mg Oral QAM Johnathon Balderas MD   750 mg at 11/20/17 0903    divalproex sodium (DEPAKOTE) EC tablet 750 mg  750 mg Oral After Lunch Johnathon Balderas MD   750 mg at 11/19/17 1624    enoxaparin (LOVENOX) subcutaneous injection 40 mg  40 mg Subcutaneous Q24H [address] Zakia Conroy MD   40 mg at 11/20/17 0903    escitalopram (LEXAPRO) tablet 20 mg  20 mg Oral Daily Johnathon Balderas MD   20 mg at 11/20/17 0903    ipratropium-albuterol (DUO-NEB) 0 5-2 5 mg/mL inhalation solution 3 mL  3 mL Nebulization Q6H Vladislav Cristina PA-C   3 mL at 11/20/17 0835    LORazepam (ATIVAN) 2 mg/mL injection 1 mg  1 mg Intravenous Q4H PRN Johnathon Balderas MD   1 mg at 11/19/17 1309    polyethylene glycol (MIRALAX) packet 17 g  17 g Oral Daily Cornell Magana MD   17 g at 11/20/17 2604    QUEtiapine (SEROquel) tablet 100 mg  100 mg Oral HS Johnathon Balderas MD   100 mg at 11/19/17 2147    senna-docusate sodium (SENOKOT S) 8 6-50 mg per tablet 1 tablet  1 tablet Oral HS Cornell Magana MD   1 tablet at 11/19/17 2147       Invasive Devices     Peripheral Intravenous Line            Peripheral IV 11/15/17 Right Forearm 4 days          Drain            Urethral Catheter Latex 14 Fr  2 days                Lab, Imaging and other studies: I have personally reviewed pertinent reports      VTE Pharmacologic Prophylaxis: Enoxaparin (Lovenox)  VTE Mechanical Prophylaxis: sequential compression device    Gabriel Meza

## 2017-11-20 NOTE — SPEECH THERAPY NOTE
SLP  Swallow Therapy Note    11:50 AM  Discussion with RN regarding patient's swallowing status and tolerance of Puree diet and Pudding Thick liquids over the weekend  Patient has been maintained NPO due to significant coughing during meals requiring suction which removed secretions mixed with foods  He is suspected to be coughing from the build up of residuals of foods/ fluids in the pharynx  Patient has not been tolerating and is unable to safely swallow a most conservative diet consistency of puree and pudding thick liquids  He presents at a high risk for aspiration of all POs  Patient is not a candidate for safe PO intakes at this time  Recommend:  1  Maintain strict NPO status  2  Must consider alternate means of nutritional support long term via PEG tube at this point  3   Maintain strict aspiration and reflux precautions  4   Increase oral hygiene  D/w patient's RN  Will continue to follow plan of care

## 2017-11-20 NOTE — PLAN OF CARE
Problem: Nutrition/Hydration-ADULT  Goal: Nutrient/Hydration intake appropriate for improving, restoring or maintaining nutritional needs  Monitor and assess patient's nutrition/hydration status for malnutrition (ex- brittle hair, bruises, dry skin, pale skin and conjunctiva, muscle wasting, smooth red tongue, and disorientation)  Collaborate with interdisciplinary team and initiate plan and interventions as ordered  Monitor patient's weight and dietary intake as ordered or per policy  Utilize nutrition screening tool and intervene per policy  Determine patient's food preferences and provide high-protein, high-caloric foods as appropriate       INTERVENTIONS:  - Monitor oral intake, urinary output, labs, and treatment plans  - Assess nutrition and hydration status and recommend course of action  - Evaluate amount of meals eaten  - Assist patient with eating if necessary   - Allow adequate time for meals  - Recommend/ encourage appropriate diets, oral nutritional supplements, and vitamin/mineral supplements  - Order, calculate, and assess calorie counts as needed  - Assess need for intravenous fluids  - Provide specific nutrition/hydration education as appropriate  - Include patient/family/caregiver in decisions related to nutrition   Outcome: Not Progressing

## 2017-11-21 PROCEDURE — 0TJB8ZZ INSPECTION OF BLADDER, VIA NATURAL OR ARTIFICIAL OPENING ENDOSCOPIC: ICD-10-PCS | Performed by: INTERNAL MEDICINE

## 2017-11-21 PROCEDURE — 97110 THERAPEUTIC EXERCISES: CPT

## 2017-11-21 PROCEDURE — 94640 AIRWAY INHALATION TREATMENT: CPT

## 2017-11-21 PROCEDURE — 94760 N-INVAS EAR/PLS OXIMETRY 1: CPT

## 2017-11-21 RX ADMIN — ESCITALOPRAM OXALATE 20 MG: 10 TABLET ORAL at 09:30

## 2017-11-21 RX ADMIN — DEXTROSE AND SODIUM CHLORIDE 60 ML/HR: 5; 450 INJECTION, SOLUTION INTRAVENOUS at 18:35

## 2017-11-21 RX ADMIN — DIVALPROEX SODIUM 250 MG: 250 TABLET, DELAYED RELEASE ORAL at 21:45

## 2017-11-21 RX ADMIN — IPRATROPIUM BROMIDE AND ALBUTEROL SULFATE 3 ML: .5; 3 SOLUTION RESPIRATORY (INHALATION) at 02:37

## 2017-11-21 RX ADMIN — Medication 1 TABLET: at 21:44

## 2017-11-21 RX ADMIN — QUETIAPINE FUMARATE 100 MG: 100 TABLET ORAL at 21:44

## 2017-11-21 RX ADMIN — ENOXAPARIN SODIUM 40 MG: 40 INJECTION SUBCUTANEOUS at 16:13

## 2017-11-21 RX ADMIN — AMPICILLIN SODIUM AND SULBACTAM SODIUM 1.5 G: 1; .5 INJECTION, POWDER, FOR SOLUTION INTRAMUSCULAR; INTRAVENOUS at 11:56

## 2017-11-21 RX ADMIN — AMPICILLIN SODIUM AND SULBACTAM SODIUM 1.5 G: 1; .5 INJECTION, POWDER, FOR SOLUTION INTRAMUSCULAR; INTRAVENOUS at 00:21

## 2017-11-21 RX ADMIN — AMITRIPTYLINE HYDROCHLORIDE 25 MG: 10 TABLET, FILM COATED ORAL at 21:44

## 2017-11-21 RX ADMIN — DIVALPROEX SODIUM 750 MG: 250 TABLET, DELAYED RELEASE ORAL at 16:12

## 2017-11-21 RX ADMIN — ATORVASTATIN CALCIUM 20 MG: 20 TABLET, FILM COATED ORAL at 21:45

## 2017-11-21 RX ADMIN — IPRATROPIUM BROMIDE AND ALBUTEROL SULFATE 3 ML: .5; 3 SOLUTION RESPIRATORY (INHALATION) at 07:21

## 2017-11-21 RX ADMIN — ASPIRIN 81 MG 81 MG: 81 TABLET ORAL at 09:29

## 2017-11-21 RX ADMIN — DIVALPROEX SODIUM 750 MG: 250 TABLET, DELAYED RELEASE ORAL at 09:29

## 2017-11-21 RX ADMIN — AMPICILLIN SODIUM AND SULBACTAM SODIUM 1.5 G: 1; .5 INJECTION, POWDER, FOR SOLUTION INTRAMUSCULAR; INTRAVENOUS at 18:15

## 2017-11-21 RX ADMIN — ARIPIPRAZOLE 30 MG: 10 TABLET ORAL at 09:31

## 2017-11-21 RX ADMIN — LORAZEPAM 1 MG: 2 INJECTION INTRAMUSCULAR; INTRAVENOUS at 16:27

## 2017-11-21 RX ADMIN — CHLORHEXIDINE GLUCONATE 15 ML: 1.2 RINSE ORAL at 21:44

## 2017-11-21 RX ADMIN — AMPICILLIN SODIUM AND SULBACTAM SODIUM 1.5 G: 1; .5 INJECTION, POWDER, FOR SOLUTION INTRAMUSCULAR; INTRAVENOUS at 05:51

## 2017-11-21 NOTE — PHYSICAL THERAPY NOTE
PT TREATMENT     11/21/17 0084   Pain Assessment   Pain Assessment Montejo-Baker FACES   Montejo-Baker FACES Pain Rating 0   Restrictions/Precautions   Other Precautions 1:1;Multiple lines; Fall Risk  (posey belt)   General   Chart Reviewed Yes   Family/Caregiver Present No   Cognition   Overall Cognitive Status Impaired   Following Commands Follows one step commands inconsistently   Subjective   Subjective mumbling, non coherent   Bed Mobility   Supine to Sit 3  Moderate assistance   Additional items Assist x 1;Verbal cues; Impulsive   Transfers   Sit to Stand 4  Minimal assistance   Additional items Assist x 2;Impulsive;Verbal cues   Stand to Sit 4  Minimal assistance   Additional items Assist x 2;Verbal cues   Ambulation/Elevation   Gait pattern Narrow QASIM; Forward Flexion  (unsteady)   Gait Assistance 3  Moderate assist   Additional items Assist x 2;Verbal cues   Assistive Device None   Distance 6 feet to chair   Activity Tolerance   Activity Tolerance (impulsive )   Exercises   Balance training  biref wgt shifting  at bedside prior to walking   Assessment   Prognosis Good   Problem List Decreased endurance; Impaired balance;Decreased mobility; Decreased coordination;Decreased cognition; Impaired judgement;Decreased safety awareness   Assessment Pt  presents with hand mits, posey belt, IV, dill and 1:1 sitter  Pt  going to OR possibly at 2:30  Pt  did not want to go back to bed, but instead walked to the chair and turned to sit down  Sitter and PT positioned pt with posey belt  All lines intact  Cont  to progress as appropriate  Goals   Patient Goals (none stated)   Plan   Treatment/Interventions Functional transfer training; Therapeutic exercise; Endurance training;Cognitive reorientation;Equipment eval/education; Bed mobility;Gait training;Patient/family training   Progress Slow progress, cognitive deficits   Recommendation   Recommendation (return to [facility] ; with PT)   In chair with 1:1 sitter, posey belt and mits

## 2017-11-21 NOTE — PLAN OF CARE
Problem: RESPIRATORY - ADULT  Goal: Achieves optimal ventilation and oxygenation  INTERVENTIONS:  - Assess for changes in respiratory status  - Assess for changes in mentation and behavior  - Position to facilitate oxygenation and minimize respiratory effort  - Oxygen administration by appropriate delivery method based on oxygen saturation (per order) or ABG  - Assess the need for suctioning and aspirate as needed  - Respiratory Therapy support as indicated   Outcome: Progressing  POC due 11/24/17 [facility]

## 2017-11-21 NOTE — OP NOTE
OPERATIVE REPORT  PATIENT NAME: Keith Wallace    :  1960  MRN: [id number]  [facility]    SURGERY DATE: 2017    Surgeon(s) and Role:     * Diego Lake MD - Primary    Preop Diagnosis:  Acute cystitis without hematuria [N30 00]    Post-Op Diagnosis Codes:     * Acute cystitis without hematuria [N30 00]    Procedure(s) (LRB):  CYSTOSCOPY FLEXIBLE (N/A)    Specimen(s):  * No specimens in log *    Estimated Blood Loss:   Minimal    Drains:  Urethral Catheter Straight-tip 14 Fr  (Active)   Site Assessment Clean;Skin intact 2017 10:34 AM   Collection Container Standard drainage bag 2017 10:34 AM   Securement Method Securing device (Describe) 2017 10:34 AM   Output (mL) 250 mL 2017 10:34 AM   Number of days: 1       [REMOVED] NG/OG/Enteral Tube Orogastric 16 Fr (Removed)   Removed 17 1255   Placement Reverification Auscultation 2017  7:47 AM   Site Assessment Intact;Dry;Clean 2017  7:47 AM   Status Suction-low continuous 2017  7:47 AM   Drainage Appearance Green 2017  7:47 AM   Intake (mL) 60 mL 2017 11:41 AM   Output (mL) 150 mL 2017 12:55 PM   Number of days: 1       [REMOVED] Urethral Catheter 16 Fr  (Removed)   Removed 11/15/17 1645   Amt returned on insertion(mL) 775 mL 2017  8:30 PM   Site Assessment Clean;Skin intact 11/15/2017  6:01 AM   Collection Container Standard drainage bag 11/15/2017  6:01 AM   Securement Method Leg strap 11/15/2017  6:01 AM   Output (mL) 300 mL 11/15/2017  4:01 PM   Number of days: 3       [REMOVED] Urethral Catheter Latex 14 Fr  (Removed)   Removed 17 0900   Site Assessment Clean;Skin intact 2017  8:00 AM   Collection Container Standard drainage bag 2017  8:00 AM   Securement Method Securing device (Describe); Tape 2017  8:00 AM   Output (mL) 400 mL 2017  8:00 AM   Number of days: 3       Anesthesia Type:   Local    Operative Indications:  Acute cystitis without hematuria [N30 00]  Urinary retention    Operative Findings:  Short appearing prostate without significant obstruction  Mild impingement into the bladder on retroflexion  No lesions of the bladder  Complications:   None    Procedure and Technique:  After obtaining consent from the state guardian the patient was identified and brought to the procedure room  He was gently restrained by the nursing staff while his existing catheter with 3 cc in the balloon was deflated and removed  He was sterilely prepped and draped and a lidocaine Uro jet was used to numb the urethra for his comfort  Cystoscopy was then performed of the small flexible scope  The urethra was without lesions or stones  The bladder was without lesions or stones  The prostate was not particularly obstructive looking in however on retroflexion there was mild to moderate enlargement of the gland impinging upon the bladder neck  There was no large median lobe to cause obstruction  He was unable to void with a filled bladder so the Lundberg catheter was reinserted and inflated with 3 cc  Plan it is unclear the best course of action for him  Operation will not get him voiding appropriately  Perhaps he will need to live just without a Lundberg catheter and hopefully he will not be terribly uncomfortable with his retention  I do not believe he will be able to live with a Lundberg catheter as he is constantly grabbing at it through the mittens he is being forced to wear for his protection  This may have an adverse effect on his life expectancy but I do not see that there is a viable option once the bladder stops functioning appropriately and emptying properly    We will remove the catheter tomorrow and start the voiding trial     Patient Disposition:   Sarita Diamond in satisfactory condition    SIGNATURE: Jovan Richard MD  DATE: November 21, 2017  TIME: 3:16 PM

## 2017-11-21 NOTE — PLAN OF CARE
Problem: PHYSICAL THERAPY ADULT  Goal: Performs mobility at highest level of function for planned discharge setting  See evaluation for individualized goals  Outcome: Progressing  Prognosis: Good  Problem List: Decreased endurance, Impaired balance, Decreased mobility, Decreased coordination, Decreased cognition, Impaired judgement, Decreased safety awareness  Assessment: Pt  presents with hand mits, posey belt, IV, dill and 1:1 sitter  Pt  going to OR possibly at 2:30  Pt  did not want to go back to bed, but instead walked to the chair and turned to sit down  Sitter and PT positioned pt with posey belt  All lines intact  Cont  to progress as appropriate  Recommendation:  (return to [facility] ; with PT)          See flowsheet documentation for full assessment

## 2017-11-21 NOTE — SOCIAL WORK
Message left for pt's guardian, Jessica Willis, to discuss enrollment in Medicare Bundle Program   Faxed Bundle Program Notification Letter to General Elaine Jimenez

## 2017-11-21 NOTE — PLAN OF CARE
DISCHARGE PLANNING - CARE MANAGEMENT     Discharge to post-acute care or home with appropriate resources Progressing        GENITOURINARY - ADULT     Maintains or returns to baseline urinary function Progressing     Absence of urinary retention Progressing     Urinary catheter remains patent Progressing        Nutrition/Hydration-ADULT     Nutrient/Hydration intake appropriate for improving, restoring or maintaining nutritional needs Progressing        Potential for Falls     Patient will remain free of falls Progressing        Prexisting or High Potential for Compromised Skin Integrity     Skin integrity is maintained or improved Progressing        RESPIRATORY - ADULT     Achieves optimal ventilation and oxygenation Progressing        SAFETY,RESTRAINT: NV/NON-SELF DESTRUCTIVE BEHAVIOR     Remains free of harm/injury (restraint for non violent/non self-detsructive behavior) Progressing     Returns to optimal restraint-free functioning Progressing        SKIN/TISSUE INTEGRITY - ADULT     Skin integrity remains intact Progressing

## 2017-11-21 NOTE — PROGRESS NOTES
Patient examined spoke with the staff  Patient remains same medical evaluation and treatment is in progress patient is resting in the bed he has a Lundberg pain and he is urinating  Patient response inappropriately or by raising his 1 arm to verbal command  No meaningful communications  Patient is not aware of his problems limitations and needs  Patient is mentally challenged demented psychotic and depressed patient is a total care  Patient is not capable to care for himself  He is a resident of [address] [place] [address]  Medical progress note reviewed and noted from Dr couch    I reviewed all his psychotropic medications  Staff reports the periods of agitation patient is psychotic unpredictable and he needs to be on one-to-one observation for the safety  Patient is on lots of psychotropic medications  I will continue medications the same at this time  Patient is not lethargic no side effects of medications noted  Patient is not suicidal but he is grossly psychotic and mentally limited  Patient will need a psychiatric follow-up after the discharge at [address] [place] [address]  I will follow up  Thank you very much

## 2017-11-21 NOTE — PROGRESS NOTES
Progress Note - Breanne Agent 61 y o  male MRN: [id number]    Unit/Bed#: [address] [place] [address] Encounter: [id number]        Subjective:   Chart reviewed patient evaluated discussed with the nursing staff  Patient is still NPO on IV fluids  Still has periods of agitation  I spoke to Rosie Kim from the public guardian office was covering for Yoselyn Hussein regarding patient's condition with regards to not able to feed him because of the concerns for aspiration and choking and he might end up needing a PEG tube  According to him Reji Dueñas will be coming back tomorrow and he prefers that she looks into the whole situation and decide about the PEG tube placement  Patient had a cystoscopy done which had shown acute cystitis without hematuria  Review of Systems    Objective:     Vitals: Blood pressure 145/87, pulse 76, temperature (!) 97 °F (36 1 °C), temperature source Tympanic, resp  rate 18, height 5' 3" (1 6 m), weight 49 kg (108 lb), SpO2 95 %  ,Body mass index is 19 13 kg/m²        Intake/Output Summary (Last 24 hours) at 11/21/17 1752  Last data filed at 11/21/17 1633   Gross per 24 hour   Intake                0 ml   Output             2875 ml   Net            -2875 ml         Current Facility-Administered Medications:     amitriptyline (ELAVIL) tablet 25 mg, 25 mg, Oral, HS, Octavio Ortez MD, 25 mg at 11/20/17 2146    ampicillin-sulbactam (UNASYN) 1 5 g in sodium chloride 0 9 % 50 mL IVPB, 1 5 g, Intravenous, Q6H, Gilberto Odonnell MD, Last Rate: 100 mL/hr at 11/21/17 1156, 1 5 g at 11/21/17 1156    ARIPiprazole (ABILIFY) tablet 30 mg, 30 mg, Oral, Daily, Johnathon Balderas MD, 30 mg at 11/21/17 0931    aspirin chewable tablet 81 mg, 81 mg, Oral, Daily, Luisa Olivera PA-C, 81 mg at 11/21/17 0929    atorvastatin (LIPITOR) tablet 20 mg, 20 mg, Oral, HS, Johnathon Balderas MD, 20 mg at 11/20/17 2146    chlorhexidine (PERIDEX) 0 12 % oral rinse 15 mL, 15 mL, Swish & Spit, Q12H [address], Luisa Olivera PA-C, 15 mL at 11/20/17 2146    dextrose 5 % and sodium chloride 0 45 % infusion, 60 mL/hr, Intravenous, Continuous, Johnathon Balderas MD, Last Rate: 60 mL/hr at 11/20/17 0900, 60 mL/hr at 11/20/17 0900    divalproex sodium (DEPAKOTE) EC tablet 250 mg, 250 mg, Oral, HS, Johnathon Balderas MD, 250 mg at 11/20/17 2147    divalproex sodium (DEPAKOTE) EC tablet 750 mg, 750 mg, Oral, QAM, Johnathon Balderas MD, 750 mg at 11/21/17 0929    divalproex sodium (DEPAKOTE) EC tablet 750 mg, 750 mg, Oral, After Lunch, Johnathon Balderas MD, 750 mg at 11/21/17 1612    enoxaparin (LOVENOX) subcutaneous injection 40 mg, 40 mg, Subcutaneous, Q24H [address], Jayne Ureña MD, 40 mg at 11/21/17 1613    escitalopram (LEXAPRO) tablet 20 mg, 20 mg, Oral, Daily, Johnathon Balderas MD, 20 mg at 11/21/17 0930    ipratropium-albuterol (DUO-NEB) 0 5-2 5 mg/mL inhalation solution 3 mL, 3 mL, Nebulization, Q6H, Treasure Johnson PA-C, 3 mL at 11/21/17 0721    LORazepam (ATIVAN) 2 mg/mL injection 1 mg, 1 mg, Intravenous, Q4H PRN, Johnathon Balderas MD, 1 mg at 11/21/17 1627    polyethylene glycol (MIRALAX) packet 17 g, 17 g, Oral, Daily, Jayne Ureña MD, 17 g at 11/20/17 3711    QUEtiapine (SEROquel) tablet 100 mg, 100 mg, Oral, HS, Johnathon Balderas MD, 100 mg at 11/20/17 2146    senna-docusate sodium (SENOKOT S) 8 6-50 mg per tablet 1 tablet, 1 tablet, Oral, HS, Jayne Ureña MD, 1 tablet at 11/20/17 2156     Physical Exam   Constitutional:   Confused and disoriented periods of agitation   HENT:   Patient has ecchymotic areas on the forehead and also on the back of the head   Eyes: Conjunctivae are normal  Pupils are equal, round, and reactive to light  Neck: Neck supple  No thyromegaly present  Cardiovascular: Normal rate and regular rhythm  Pulmonary/Chest: No respiratory distress  He exhibits no tenderness  Transmitted sounds present   Abdominal: Soft  Bowel sounds are normal    Musculoskeletal: Normal range of motion   He exhibits no edema or deformity  Neurological: He is alert  Periods of agitation   Skin: Skin is warm and dry  Lab, Imaging and culture:     Recent Results (from the past 72 hour(s))   Comprehensive metabolic panel    Collection Time: 11/19/17  6:35 AM   Result Value Ref Range    Sodium 145 136 - 145 mmol/L    Potassium 4 1 3 5 - 5 3 mmol/L    Chloride 105 100 - 108 mmol/L    CO2 35 (H) 21 - 32 mmol/L    Anion Gap 5 4 - 13 mmol/L    BUN 8 5 - 25 mg/dL    Creatinine 0 17 (L) 0 60 - 1 30 mg/dL    Glucose 96 65 - 140 mg/dL    Calcium 8 4 8 3 - 10 1 mg/dL    AST 50 (H) 5 - 45 U/L    ALT 58 12 - 78 U/L    Alkaline Phosphatase 113 46 - 116 U/L    Total Protein 5 9 (L) 6 4 - 8 2 g/dL    Albumin 2 0 (L) 3 5 - 5 0 g/dL    Total Bilirubin 0 30 0 20 - 1 00 mg/dL    eGFR 186 ml/min/1 73sq m       XR chest portable   Final Result      Limited study  Mild right basilar opacity representing atelectasis or developing pneumonia  Left basilar subsegmental atelectasis  Workstation performed: [id number]         FL barium swallow video w speech   Final Result      Large residuals in the valleculae and piriform sinuses  No evidence of laryngeal penetration or aspiration  Please see the Speech Therapist's report for a full description of findings and recommendations  Workstation performed: [id number]         XR chest portable ICU   Final Result   Slight worsening of bilateral alveolar infiltrates  Workstation performed: [id number]         XR chest portable   Final Result      Improved aeration of the left lung with some residual left lower lobe infiltrate or atelectasis  There is no pneumothorax  Workstation performed: [id number]         XR chest portable   Final Result      Completely opacified left lung likely related to left lung atelectasis, perhaps related to mucous plugging  ##imslh##imslh         Workstation performed: [id number]         XR chest portable ICU   Final Result   1    Evolving left lower lobe infiltrate representing atelectasis or pneumonia  2   Low-lying endotracheal tube with tip projecting towards the left mainstem bronchus  Repositioning is recommended  3   Malpositioned orogastric tube with tip projecting into the EG junction  Repositioning recommended  ##sigslh##sigslh         Workstation performed: [id number]         XR chest portable   Final Result      ET tube tip above the hong  Workstation performed: [id number]         XR chest portable   Final Result      No active pulmonary disease  Workstation performed: [id number]         XR skull < 4 vw   Final Result      No radiopaque foreign body  Workstation performed: [id number]         MRI brain w wo contrast   Final Result   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      No focal brain parenchymal abnormalities      No acute intracranial hemorrhage or mass effect      No pathologic enhancement               Workstation performed: [id number]         XR chest 1 view portable   Final Result   Diminished inspiration  No active pulmonary disease           Workstation performed: [id number]             Invasive Devices     Peripheral Intravenous Line            Peripheral IV 11/20/17 Right Wrist less than 1 day          Drain            Urethral Catheter Straight-tip 14 Fr  1 day                      Assessment:  Acute respiratory failure with hypoxia resolved  Aspiration pneumonia sputum showing Moraxella catarrhalis  Atelectasis  Urinary tract infection culture revealing Enterococcus faecalis  Frequent falls with head injury  Postconcussion syndrome  Severe intellectual disability  Chronic dysphagia concern for recurrent aspiration  Urinary retention currently has a Lnudberg catheter being followed by the urologist     Plan:  Continue with IV antibiotics continue with IV fluids  Aspiration precautions  NPO except meds  IV fluids  Discussed with the nursing staff  PT evaluation and treatment  Discussed with Rogelio Heredia regarding patient's condition from the public guardian office

## 2017-11-21 NOTE — PROGRESS NOTES
Progress Note - Rod Antonio 61 y o  male MRN: [id number]    Unit/Bed#: [address] [place] [address] Encounter: [id number]        Subjective:   Patient is NPO  No cough congestion noted discussed with the nursing staff  Pulmonary note appreciated  Urology note appreciated patient still has the Lundberg catheter  I called patient's guardian Saul Merchant with regards to the patient's condition and left a message and waiting for her to call me back  Review of Systems    Objective:     Vitals: Blood pressure 131/71, pulse 65, temperature 97 6 °F (36 4 °C), temperature source Tympanic, resp  rate 19, height 5' 3" (1 6 m), weight 48 5 kg (106 lb 14 8 oz), SpO2 95 %  ,Body mass index is 18 94 kg/m²        Intake/Output Summary (Last 24 hours) at 11/20/17 1956  Last data filed at 11/20/17 0900   Gross per 24 hour   Intake             1090 ml   Output             1800 ml   Net             -710 ml         Current Facility-Administered Medications:     amitriptyline (ELAVIL) tablet 25 mg, 25 mg, Oral, HS, Carl Kulkarni MD, 25 mg at 11/19/17 2148    ampicillin-sulbactam (UNASYN) 1 5 g in sodium chloride 0 9 % 50 mL IVPB, 1 5 g, Intravenous, Q6H, Ce Hanson MD, Last Rate: 100 mL/hr at 11/20/17 1741, 1 5 g at 11/20/17 1741    ARIPiprazole (ABILIFY) tablet 30 mg, 30 mg, Oral, Daily, Johnathon Balderas MD, 30 mg at 11/20/17 0904    aspirin chewable tablet 81 mg, 81 mg, Oral, Daily, Luisa Olivera PA-C, 81 mg at 11/20/17 0903    atorvastatin (LIPITOR) tablet 20 mg, 20 mg, Oral, HS, Johnathon Balderas MD, 20 mg at 11/19/17 2148    chlorhexidine (PERIDEX) 0 12 % oral rinse 15 mL, 15 mL, Swish & Spit, Q12H [address], Luisa Olivera PA-C, 15 mL at 11/20/17 0904    dextrose 5 % and sodium chloride 0 45 % infusion, 60 mL/hr, Intravenous, Continuous, Johnathon Balderas MD, Last Rate: 60 mL/hr at 11/20/17 0900, 60 mL/hr at 11/20/17 0900    divalproex sodium (DEPAKOTE) EC tablet 250 mg, 250 mg, Oral, HS, Johnathon Balderas MD, 250 mg at 11/19/17 2148    divalproex sodium (DEPAKOTE) EC tablet 750 mg, 750 mg, Oral, QAM, Johnathon Balderas MD, 750 mg at 11/20/17 0903    divalproex sodium (DEPAKOTE) EC tablet 750 mg, 750 mg, Oral, After Lunch, Johnathon Balderas MD, 750 mg at 11/19/17 1624    enoxaparin (LOVENOX) subcutaneous injection 40 mg, 40 mg, Subcutaneous, Q24H [address], Jacqueline Paul MD, 40 mg at 11/20/17 0903    escitalopram (LEXAPRO) tablet 20 mg, 20 mg, Oral, Daily, Johnathon Balderas MD, 20 mg at 11/20/17 0903    ipratropium-albuterol (DUO-NEB) 0 5-2 5 mg/mL inhalation solution 3 mL, 3 mL, Nebulization, Q6H, Tiffany Sebastian PA-C, 3 mL at 11/20/17 1406    LORazepam (ATIVAN) 2 mg/mL injection 1 mg, 1 mg, Intravenous, Q4H PRN, Johnathon Balderas MD, 1 mg at 11/19/17 1309    polyethylene glycol (MIRALAX) packet 17 g, 17 g, Oral, Daily, Jacqueline Paul MD, 17 g at 11/20/17 6577    QUEtiapine (SEROquel) tablet 100 mg, 100 mg, Oral, HS, Johnathon Balderas MD, 100 mg at 11/19/17 2147    senna-docusate sodium (SENOKOT S) 8 6-50 mg per tablet 1 tablet, 1 tablet, Oral, HS, Jacqueline Paul MD, 1 tablet at 11/19/17 2147     Physical Exam   Constitutional:   Confused and disoriented periods of agitation   HENT:   Patient has ecchymotic areas on the forehead and also on the back of the head   Eyes: Conjunctivae are normal  Pupils are equal, round, and reactive to light  Neck: Neck supple  No thyromegaly present  Cardiovascular: Normal rate and regular rhythm  Pulmonary/Chest: No respiratory distress  He exhibits no tenderness  Transmitted sounds present   Abdominal: Soft  Bowel sounds are normal    Musculoskeletal: Normal range of motion  He exhibits no edema or deformity  Neurological: He is alert  Periods of agitation   Skin: Skin is warm and dry             Lab, Imaging and culture:     Recent Results (from the past 72 hour(s))   Basic metabolic panel    Collection Time: 11/18/17  7:07 AM   Result Value Ref Range    Sodium 144 136 - 145 mmol/L Potassium 3 8 3 5 - 5 3 mmol/L    Chloride 104 100 - 108 mmol/L    CO2 36 (H) 21 - 32 mmol/L    Anion Gap 4 4 - 13 mmol/L    BUN 6 5 - 25 mg/dL    Creatinine 0 23 (L) 0 60 - 1 30 mg/dL    Glucose 94 65 - 140 mg/dL    Calcium 8 6 8 3 - 10 1 mg/dL    eGFR 164 ml/min/1 73sq m   CBC and differential    Collection Time: 11/18/17  7:07 AM   Result Value Ref Range    WBC 6 10 4 80 - 10 80 Thousand/uL    RBC 3 86 (L) 4 70 - 6 10 Million/uL    Hemoglobin 10 8 (L) 14 0 - 18 0 g/dL    Hematocrit 32 9 (L) 42 0 - 52 0 %    MCV 85 82 - 98 fL    MCH 28 0 27 0 - 31 0 pg    MCHC 32 9 31 4 - 37 4 g/dL    RDW 14 6 11 6 - 15 1 %    MPV 6 9 (L) 8 9 - 12 7 fL    Platelets 915 914 - 165 Thousands/uL    nRBC 0 /100 WBCs    Neutrophils Relative 78 (H) 43 - 75 %    Lymphocytes Relative 10 (L) 14 - 44 %    Monocytes Relative 8 4 - 12 %    Eosinophils Relative 3 0 - 6 %    Basophils Relative 0 0 - 1 %    Neutrophils Absolute 4 70 1 85 - 7 62 Thousands/µL    Lymphocytes Absolute 0 60 0 60 - 4 47 Thousands/µL    Monocytes Absolute 0 50 0 17 - 1 22 Thousand/µL    Eosinophils Absolute 0 20 0 00 - 0 61 Thousand/µL    Basophils Absolute 0 00 0 00 - 0 10 Thousands/µL   Comprehensive metabolic panel    Collection Time: 11/19/17  6:35 AM   Result Value Ref Range    Sodium 145 136 - 145 mmol/L    Potassium 4 1 3 5 - 5 3 mmol/L    Chloride 105 100 - 108 mmol/L    CO2 35 (H) 21 - 32 mmol/L    Anion Gap 5 4 - 13 mmol/L    BUN 8 5 - 25 mg/dL    Creatinine 0 17 (L) 0 60 - 1 30 mg/dL    Glucose 96 65 - 140 mg/dL    Calcium 8 4 8 3 - 10 1 mg/dL    AST 50 (H) 5 - 45 U/L    ALT 58 12 - 78 U/L    Alkaline Phosphatase 113 46 - 116 U/L    Total Protein 5 9 (L) 6 4 - 8 2 g/dL    Albumin 2 0 (L) 3 5 - 5 0 g/dL    Total Bilirubin 0 30 0 20 - 1 00 mg/dL    eGFR 186 ml/min/1 73sq m       XR chest portable   Final Result      Limited study  Mild right basilar opacity representing atelectasis or developing pneumonia  Left basilar subsegmental atelectasis  Workstation performed: [id number]         FL barium swallow video w speech   Final Result      Large residuals in the valleculae and piriform sinuses  No evidence of laryngeal penetration or aspiration  Please see the Speech Therapist's report for a full description of findings and recommendations  Workstation performed: [id number]         XR chest portable ICU   Final Result   Slight worsening of bilateral alveolar infiltrates  Workstation performed: [id number]         XR chest portable   Final Result      Improved aeration of the left lung with some residual left lower lobe infiltrate or atelectasis  There is no pneumothorax  Workstation performed: [id number]         XR chest portable   Final Result      Completely opacified left lung likely related to left lung atelectasis, perhaps related to mucous plugging  ##imslh##imslh         Workstation performed: [id number]         XR chest portable ICU   Final Result   1  Evolving left lower lobe infiltrate representing atelectasis or pneumonia  2   Low-lying endotracheal tube with tip projecting towards the left mainstem bronchus  Repositioning is recommended  3   Malpositioned orogastric tube with tip projecting into the EG junction  Repositioning recommended  ##sigslh##sigslh         Workstation performed: [id number]         XR chest portable   Final Result      ET tube tip above the hong  Workstation performed: [id number]         XR chest portable   Final Result      No active pulmonary disease  Workstation performed: [id number]         XR skull < 4 vw   Final Result      No radiopaque foreign body                  Workstation performed: [id number]         MRI brain w wo contrast   Final Result   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      No focal brain parenchymal abnormalities      No acute intracranial hemorrhage or mass effect      No pathologic enhancement               Workstation performed: [id number]         XR chest 1 view portable   Final Result   Diminished inspiration  No active pulmonary disease  Workstation performed: [id number]             Invasive Devices     Peripheral Intravenous Line            Peripheral IV 11/20/17 Left Forearm less than 1 day          Drain            Urethral Catheter Straight-tip 14 Fr  less than 1 day                  Results from last 7 days  Lab Units 11/14/17  1259   GRAM STAIN RESULT  4+ Polys  2+ Gram positive cocci in pairs  1+ Gram positive cocci in clusters       Assessment:  Acute respiratory failure with hypoxia resolved  Aspiration pneumonia sputum showing Moraxella catarrhalis  Atelectasis  Urinary tract infection culture revealing Enterococcus faecalis  Frequent falls with head injury  Postconcussion syndrome  Severe intellectual disability  Chronic dysphagia concern for recurrent aspiration  Urinary retention currently has a Lundberg catheter being followed by the urologist     Plan:  Continue with IV antibiotics continue with IV fluids  Aspiration precautions  NPO except meds  IV fluids  Discussed with the nursing staff  PT evaluation and treatment  Left a message with the guardian to call me back  Patient might require PEG tube  Tunde Babcock

## 2017-11-22 LAB
ANION GAP SERPL CALCULATED.3IONS-SCNC: 7 MMOL/L (ref 4–13)
BASOPHILS # BLD AUTO: 0 THOUSANDS/ΜL (ref 0–0.1)
BASOPHILS NFR BLD AUTO: 1 % (ref 0–1)
BUN SERPL-MCNC: 7 MG/DL (ref 5–25)
CALCIUM SERPL-MCNC: 8.3 MG/DL (ref 8.3–10.1)
CHLORIDE SERPL-SCNC: 106 MMOL/L (ref 100–108)
CO2 SERPL-SCNC: 30 MMOL/L (ref 21–32)
CREAT SERPL-MCNC: <0.15 MG/DL (ref 0.6–1.3)
EOSINOPHIL # BLD AUTO: 0.2 THOUSAND/ΜL (ref 0–0.61)
EOSINOPHIL NFR BLD AUTO: 4 % (ref 0–6)
ERYTHROCYTE [DISTWIDTH] IN BLOOD BY AUTOMATED COUNT: 14.7 % (ref 11.6–15.1)
GLUCOSE SERPL-MCNC: 80 MG/DL (ref 65–140)
HCT VFR BLD AUTO: 35.2 % (ref 42–52)
HGB BLD-MCNC: 11.7 G/DL (ref 14–18)
LYMPHOCYTES # BLD AUTO: 0.7 THOUSANDS/ΜL (ref 0.6–4.47)
LYMPHOCYTES NFR BLD AUTO: 17 % (ref 14–44)
MCH RBC QN AUTO: 28.5 PG (ref 27–31)
MCHC RBC AUTO-ENTMCNC: 33.2 G/DL (ref 31.4–37.4)
MCV RBC AUTO: 86 FL (ref 82–98)
MONOCYTES # BLD AUTO: 0.3 THOUSAND/ΜL (ref 0.17–1.22)
MONOCYTES NFR BLD AUTO: 8 % (ref 4–12)
NEUTROPHILS # BLD AUTO: 3 THOUSANDS/ΜL (ref 1.85–7.62)
NEUTS SEG NFR BLD AUTO: 71 % (ref 43–75)
NRBC BLD AUTO-RTO: 0 /100 WBCS
PLATELET # BLD AUTO: 150 THOUSANDS/UL (ref 130–400)
PMV BLD AUTO: 7.1 FL (ref 8.9–12.7)
POTASSIUM SERPL-SCNC: 3.6 MMOL/L (ref 3.5–5.3)
RBC # BLD AUTO: 4.1 MILLION/UL (ref 4.7–6.1)
SODIUM SERPL-SCNC: 143 MMOL/L (ref 136–145)
WBC # BLD AUTO: 4.2 THOUSAND/UL (ref 4.8–10.8)

## 2017-11-22 PROCEDURE — 85025 COMPLETE CBC W/AUTO DIFF WBC: CPT | Performed by: INTERNAL MEDICINE

## 2017-11-22 PROCEDURE — 94760 N-INVAS EAR/PLS OXIMETRY 1: CPT

## 2017-11-22 PROCEDURE — 80048 BASIC METABOLIC PNL TOTAL CA: CPT | Performed by: INTERNAL MEDICINE

## 2017-11-22 PROCEDURE — 94640 AIRWAY INHALATION TREATMENT: CPT

## 2017-11-22 RX ADMIN — DIVALPROEX SODIUM 750 MG: 250 TABLET, DELAYED RELEASE ORAL at 09:35

## 2017-11-22 RX ADMIN — ASPIRIN 81 MG 81 MG: 81 TABLET ORAL at 09:36

## 2017-11-22 RX ADMIN — ARIPIPRAZOLE 30 MG: 10 TABLET ORAL at 09:37

## 2017-11-22 RX ADMIN — AMPICILLIN SODIUM AND SULBACTAM SODIUM 1.5 G: 1; .5 INJECTION, POWDER, FOR SOLUTION INTRAMUSCULAR; INTRAVENOUS at 17:27

## 2017-11-22 RX ADMIN — ATORVASTATIN CALCIUM 20 MG: 20 TABLET, FILM COATED ORAL at 22:09

## 2017-11-22 RX ADMIN — DIVALPROEX SODIUM 750 MG: 250 TABLET, DELAYED RELEASE ORAL at 13:34

## 2017-11-22 RX ADMIN — DIVALPROEX SODIUM 250 MG: 250 TABLET, DELAYED RELEASE ORAL at 22:09

## 2017-11-22 RX ADMIN — AMPICILLIN SODIUM AND SULBACTAM SODIUM 1.5 G: 1; .5 INJECTION, POWDER, FOR SOLUTION INTRAMUSCULAR; INTRAVENOUS at 13:27

## 2017-11-22 RX ADMIN — ENOXAPARIN SODIUM 40 MG: 40 INJECTION SUBCUTANEOUS at 09:36

## 2017-11-22 RX ADMIN — IPRATROPIUM BROMIDE AND ALBUTEROL SULFATE 3 ML: .5; 3 SOLUTION RESPIRATORY (INHALATION) at 07:31

## 2017-11-22 RX ADMIN — DEXTROSE AND SODIUM CHLORIDE 60 ML/HR: 5; 450 INJECTION, SOLUTION INTRAVENOUS at 15:33

## 2017-11-22 RX ADMIN — AMPICILLIN SODIUM AND SULBACTAM SODIUM 1.5 G: 1; .5 INJECTION, POWDER, FOR SOLUTION INTRAMUSCULAR; INTRAVENOUS at 05:52

## 2017-11-22 RX ADMIN — IPRATROPIUM BROMIDE AND ALBUTEROL SULFATE 3 ML: .5; 3 SOLUTION RESPIRATORY (INHALATION) at 14:41

## 2017-11-22 RX ADMIN — CHLORHEXIDINE GLUCONATE 15 ML: 1.2 RINSE ORAL at 09:37

## 2017-11-22 RX ADMIN — IPRATROPIUM BROMIDE AND ALBUTEROL SULFATE 3 ML: .5; 3 SOLUTION RESPIRATORY (INHALATION) at 01:35

## 2017-11-22 RX ADMIN — AMPICILLIN SODIUM AND SULBACTAM SODIUM 1.5 G: 1; .5 INJECTION, POWDER, FOR SOLUTION INTRAMUSCULAR; INTRAVENOUS at 00:58

## 2017-11-22 RX ADMIN — AMITRIPTYLINE HYDROCHLORIDE 25 MG: 10 TABLET, FILM COATED ORAL at 22:08

## 2017-11-22 RX ADMIN — ESCITALOPRAM OXALATE 20 MG: 10 TABLET ORAL at 09:36

## 2017-11-22 RX ADMIN — IPRATROPIUM BROMIDE AND ALBUTEROL SULFATE 3 ML: .5; 3 SOLUTION RESPIRATORY (INHALATION) at 20:49

## 2017-11-22 RX ADMIN — QUETIAPINE FUMARATE 100 MG: 100 TABLET ORAL at 22:09

## 2017-11-22 NOTE — PROGRESS NOTES
Progress Note - Urology   Flor Robles 61 y o  male MRN: [id number]  Unit/Bed#: [address] [place] [address] Encounter: [id number]    Assessment/Plan:  Urinary retention  Previous straight catheterization 650mL and dill placement for 1,100mL  Unable to tolerate dill secondary to mental status  Cystoscopy 11/21/17 indicated a short appearing prostate without significant obstruction (not a surgical candidate)   Retention my be secondary to bladder dysfunction  Unable to perform urodynamics for confirmation due to his cognitive disabilities  Recent falls and injury making alpha blockers not a good option  Dill removed in a m  of 11/22/17  · Leave pt without a dill and monitor pt while in overflow incontinence  · Avoid placing a dill or straight cath  Subjective:  Seen on rounds and notes the following: Pt lying in bed rolling around  Still waves his hands with the mittens on when you come near the pt  No signs of abdominal distention with seen in the AM      Objective:  Vitals Last 24 hours:   Temp:  [96 8 °F (36 °C)-97 8 °F (36 6 °C)] 96 8 °F (36 °C)  HR:  [72-87] 87  Resp:  [18] 18  BP: (117-141)/(59-85) 141/85  BMI: Body mass index is 18 8 kg/m²  Physical Exam   Constitutional:   thin   HENT:   Head: Normocephalic  Pulmonary/Chest: Effort normal  No respiratory distress  Abdominal: Soft  Skin: Skin is warm         Drains:   none    Lab Results and Cultures:     Results from last 7 days  Lab Units 11/22/17  0628 11/19/17  0635 11/18/17  0707 11/16/17  0711   WBC Thousand/uL 4 20*  --  6 10 6 00   RBC Million/uL 4 10*  --  3 86* 3 98*   HEMOGLOBIN g/dL 11 7*  --  10 8* 11 0*   HEMATOCRIT % 35 2*  --  32 9* 34 2*   PLATELETS Thousands/uL 150  --  161 151   POTASSIUM mmol/L 3 6 4 1 3 8 4 4   CHLORIDE mmol/L 106 105 104 107   CO2 mmol/L 30 35* 36* 33*   BUN mg/dL 7 8 6 3*   CREATININE mg/dL <0 15* 0 17* 0 23* 0 17*   GLUCOSE RANDOM mg/dL 80 96 94 89   CALCIUM mg/dL 8 3 8 4 8 6 8 4   AST U/L  --  50*  --   -- ALT U/L  --  58  --   --    ALK PHOS U/L  --  113  --   --    ALBUMIN g/dL  --  2 0*  --   --    BILIRUBIN TOTAL mg/dL  --  0 30  --   --    EGFR ml/min/1 73sq m  --  186 164 186                   Lab Results   Component Value Date    URINECX 10,000-19,000 cfu/ml Enterococcus faecalis (A) 11/13/2017         Intake/Output Summary (Last 24 hours) at 11/22/17 1423  Last data filed at 11/22/17 0601   Gross per 24 hour   Intake                0 ml   Output             2475 ml   Net            -2475 ml     Kirt Watts PA-C  11/22/2017    Portions of the record may have been created with voice recognition software   Occasional wrong word or "sound a like" substitutions may have occurred due to the inherent limitations of voice recognition software   Read the chart carefully and recognize, using context, where substitutions have occurred

## 2017-11-22 NOTE — PROGRESS NOTES
Patient examined spoke with the nurse medical progress note reviewed and noted patient had cystoscopy it shown acute cystitis without hematuria patient is still NPO for  risk of aspiration  Patient remain on one-to-one observation staff reports that patient gets agitated time to time  Patient raise his both arms in response to a simple verbal commands but does not talk patient is profoundly mentally limited no meaningful communications he is not aware of his problems limitations and needs  Patient has a guardian  Patient needs psychotropic medications to control his behavior  Patient has been a total care patient is not able to care for himself  Patient is mentally retarded demented psychotic depressed but not suicidal   No side effects of his psychotropic medications noted  I will continue all psychotropic medications the same as ordered at this time  Patient will need psychiatric follow-up after the discharge at [facility]  I will follow up  Thank you very much

## 2017-11-22 NOTE — PLAN OF CARE
Problem: Nutrition/Hydration-ADULT  Goal: Nutrient/Hydration intake appropriate for improving, restoring or maintaining nutritional needs  Monitor and assess patient's nutrition/hydration status for malnutrition (ex- brittle hair, bruises, dry skin, pale skin and conjunctiva, muscle wasting, smooth red tongue, and disorientation)  Collaborate with interdisciplinary team and initiate plan and interventions as ordered  Monitor patient's weight and dietary intake as ordered or per policy  Utilize nutrition screening tool and intervene per policy  Determine patient's food preferences and provide high-protein, high-caloric foods as appropriate  INTERVENTIONS:  - Monitor oral intake, urinary output, labs, and treatment plans  - Assess nutrition and hydration status and recommend course of action  - Evaluate amount of meals eaten  - Assist patient with eating if necessary   - Allow adequate time for meals  - Recommend/ encourage appropriate diets, oral nutritional supplements, and vitamin/mineral supplements  - Order, calculate, and assess calorie counts as needed  - Assess need for intravenous fluids  - Provide specific nutrition/hydration education as appropriate  - Include patient/family/caregiver in decisions related to nutrition   Outcome: Not Progressing  If deemed appropriate, recommend initiating nutrition support via PEG   Please consult RD as needed for TF recommendations

## 2017-11-23 PROCEDURE — 94760 N-INVAS EAR/PLS OXIMETRY 1: CPT

## 2017-11-23 PROCEDURE — 94640 AIRWAY INHALATION TREATMENT: CPT

## 2017-11-23 RX ADMIN — IPRATROPIUM BROMIDE AND ALBUTEROL SULFATE 3 ML: .5; 3 SOLUTION RESPIRATORY (INHALATION) at 20:28

## 2017-11-23 RX ADMIN — AMITRIPTYLINE HYDROCHLORIDE 25 MG: 10 TABLET, FILM COATED ORAL at 21:45

## 2017-11-23 RX ADMIN — QUETIAPINE FUMARATE 100 MG: 100 TABLET ORAL at 21:45

## 2017-11-23 RX ADMIN — Medication 1 TABLET: at 21:49

## 2017-11-23 RX ADMIN — DIVALPROEX SODIUM 750 MG: 250 TABLET, DELAYED RELEASE ORAL at 11:08

## 2017-11-23 RX ADMIN — IPRATROPIUM BROMIDE AND ALBUTEROL SULFATE 3 ML: .5; 3 SOLUTION RESPIRATORY (INHALATION) at 14:01

## 2017-11-23 RX ADMIN — DIVALPROEX SODIUM 250 MG: 250 TABLET, DELAYED RELEASE ORAL at 21:45

## 2017-11-23 RX ADMIN — ATORVASTATIN CALCIUM 20 MG: 20 TABLET, FILM COATED ORAL at 21:45

## 2017-11-23 RX ADMIN — ESCITALOPRAM OXALATE 20 MG: 10 TABLET ORAL at 11:03

## 2017-11-23 RX ADMIN — DIVALPROEX SODIUM 750 MG: 250 TABLET, DELAYED RELEASE ORAL at 15:33

## 2017-11-23 RX ADMIN — IPRATROPIUM BROMIDE AND ALBUTEROL SULFATE 3 ML: .5; 3 SOLUTION RESPIRATORY (INHALATION) at 08:31

## 2017-11-23 RX ADMIN — LORAZEPAM 1 MG: 2 INJECTION INTRAMUSCULAR; INTRAVENOUS at 11:02

## 2017-11-23 RX ADMIN — ASPIRIN 81 MG 81 MG: 81 TABLET ORAL at 11:08

## 2017-11-23 RX ADMIN — ENOXAPARIN SODIUM 40 MG: 40 INJECTION SUBCUTANEOUS at 11:02

## 2017-11-23 RX ADMIN — CHLORHEXIDINE GLUCONATE 15 ML: 1.2 RINSE ORAL at 21:46

## 2017-11-23 RX ADMIN — ARIPIPRAZOLE 30 MG: 10 TABLET ORAL at 11:11

## 2017-11-23 NOTE — PLAN OF CARE
DISCHARGE PLANNING - CARE MANAGEMENT     Discharge to post-acute care or home with appropriate resources Progressing        GENITOURINARY - ADULT     Maintains or returns to baseline urinary function Progressing     Absence of urinary retention Progressing     Urinary catheter remains patent Progressing        Nutrition/Hydration-ADULT     Nutrient/Hydration intake appropriate for improving, restoring or maintaining nutritional needs Progressing        Potential for Falls     Patient will remain free of falls Progressing        Prexisting or High Potential for Compromised Skin Integrity     Skin integrity is maintained or improved Progressing        RESPIRATORY - ADULT     Achieves optimal ventilation and oxygenation Progressing        SAFETY,RESTRAINT: NV/NON-SELF DESTRUCTIVE BEHAVIOR     Remains free of harm/injury (restraint for non violent/non self-detsructive behavior) Progressing     Returns to optimal restraint-free functioning Progressing        SKIN/TISSUE INTEGRITY - ADULT     Skin integrity remains intact Progressing        Pt unable to take an active part in plan of care due to cognitive deficits

## 2017-11-23 NOTE — PROGRESS NOTES
Pt has not voided since removal of Lundberg cath yesterday  Dr Sheryle Fees and Dr Selma Rodriguez both made aware of this morning's BS of 541 mL  No new orders       11/23/17 0601   Urine Output/Assessment   Bladder Scan Volume (mL) 541 mL

## 2017-11-23 NOTE — PROGRESS NOTES
Progress Note - Kassidy Del Valle 61 y o  male MRN: [id number]    Unit/Bed#: [address] [place] [address] Encounter: [id number]        Subjective:   Patient has not voided urine yet  He is still NPO  Patient's guardian does not want patient to have the PEG tube placed in  Discussed with the nursing staff urologist at present time does not want the Lundberg catheter  Patient is restless at times receiving p r n  Ativan      Review of Systems    Objective:     Vitals: Blood pressure 116/59, pulse 72, temperature (!) 96 9 °F (36 1 °C), temperature source Tympanic, resp  rate 18, height 5' 3" (1 6 m), weight 48 kg (105 lb 13 1 oz), SpO2 97 %  ,Body mass index is 18 75 kg/m²        Intake/Output Summary (Last 24 hours) at 11/23/17 1129  Last data filed at 11/23/17 0601   Gross per 24 hour   Intake              720 ml   Output                0 ml   Net              720 ml         Current Facility-Administered Medications:     amitriptyline (ELAVIL) tablet 25 mg, 25 mg, Oral, HS, Alejandro Gould MD, 25 mg at 11/22/17 2208    ARIPiprazole (ABILIFY) tablet 30 mg, 30 mg, Oral, Daily, Johnathon Balderas MD, 30 mg at 11/23/17 1111    aspirin chewable tablet 81 mg, 81 mg, Oral, Daily, Luisa Olivera PA-C, 81 mg at 11/23/17 1108    atorvastatin (LIPITOR) tablet 20 mg, 20 mg, Oral, HS, Johnathon Balderas MD, 20 mg at 11/22/17 2209    chlorhexidine (PERIDEX) 0 12 % oral rinse 15 mL, 15 mL, Swish & Spit, Q12H [address], Luisa Olivera PA-C, 15 mL at 11/22/17 4960    dextrose 5 % and sodium chloride 0 45 % infusion, 60 mL/hr, Intravenous, Continuous, Johnathon Balderas MD, Last Rate: 60 mL/hr at 11/22/17 1533, 60 mL/hr at 11/22/17 1533    divalproex sodium (DEPAKOTE) EC tablet 250 mg, 250 mg, Oral, HS, Johnathon Balderas MD, 250 mg at 11/22/17 2209    divalproex sodium (DEPAKOTE) EC tablet 750 mg, 750 mg, Oral, QAM, Johnathon Balderas MD, 750 mg at 11/23/17 1108    divalproex sodium (DEPAKOTE) EC tablet 750 mg, 750 mg, Oral, After Lunch, Diane Babcock MD, 750 mg at 11/22/17 1334    enoxaparin (LOVENOX) subcutaneous injection 40 mg, 40 mg, Subcutaneous, Q24H [address], Nghia Ellsworth MD, 40 mg at 11/23/17 1102    escitalopram (LEXAPRO) tablet 20 mg, 20 mg, Oral, Daily, Johnathon Balderas MD, 20 mg at 11/23/17 1103    ipratropium-albuterol (DUO-NEB) 0 5-2 5 mg/mL inhalation solution 3 mL, 3 mL, Nebulization, Q6H, Melody Tomlinson PA-C, 3 mL at 11/23/17 0831    LORazepam (ATIVAN) 2 mg/mL injection 1 mg, 1 mg, Intravenous, Q4H PRN, Johnathon Balderas MD, 1 mg at 11/23/17 1102    polyethylene glycol (MIRALAX) packet 17 g, 17 g, Oral, Daily, Nghia Ellsworth MD, 17 g at 11/20/17 6025    QUEtiapine (SEROquel) tablet 100 mg, 100 mg, Oral, HS, Johnathon Balderas MD, 100 mg at 11/22/17 2209    senna-docusate sodium (SENOKOT S) 8 6-50 mg per tablet 1 tablet, 1 tablet, Oral, HS, Nghia Ellsworth MD, 1 tablet at 11/21/17 2144     Physical Exam   Constitutional:   Confused and disoriented periods of agitation   HENT:   Patient has ecchymotic areas on the forehead and also on the back of the head   Eyes: Conjunctivae are normal  Pupils are equal, round, and reactive to light  Neck: Neck supple  No thyromegaly present  Cardiovascular: Normal rate and regular rhythm  Pulmonary/Chest: No respiratory distress  He exhibits no tenderness  Transmitted sounds present   Abdominal: Soft  Bowel sounds are normal    Musculoskeletal: Normal range of motion  He exhibits no edema or deformity  Neurological: He is alert  Periods of agitation   Skin: Skin is warm and dry             Lab, Imaging and culture:     Recent Results (from the past 72 hour(s))   CBC and differential    Collection Time: 11/22/17  6:28 AM   Result Value Ref Range    WBC 4 20 (L) 4 80 - 10 80 Thousand/uL    RBC 4 10 (L) 4 70 - 6 10 Million/uL    Hemoglobin 11 7 (L) 14 0 - 18 0 g/dL    Hematocrit 35 2 (L) 42 0 - 52 0 %    MCV 86 82 - 98 fL    MCH 28 5 27 0 - 31 0 pg    MCHC 33 2 31 4 - 37 4 g/dL    RDW 14 7 11 6 - 15 1 %    MPV 7 1 (L) 8 9 - 12 7 fL    Platelets 673 076 - 496 Thousands/uL    nRBC 0 /100 WBCs    Neutrophils Relative 71 43 - 75 %    Lymphocytes Relative 17 14 - 44 %    Monocytes Relative 8 4 - 12 %    Eosinophils Relative 4 0 - 6 %    Basophils Relative 1 0 - 1 %    Neutrophils Absolute 3 00 1 85 - 7 62 Thousands/µL    Lymphocytes Absolute 0 70 0 60 - 4 47 Thousands/µL    Monocytes Absolute 0 30 0 17 - 1 22 Thousand/µL    Eosinophils Absolute 0 20 0 00 - 0 61 Thousand/µL    Basophils Absolute 0 00 0 00 - 0 10 Thousands/µL   Basic metabolic panel    Collection Time: 11/22/17  6:28 AM   Result Value Ref Range    Sodium 143 136 - 145 mmol/L    Potassium 3 6 3 5 - 5 3 mmol/L    Chloride 106 100 - 108 mmol/L    CO2 30 21 - 32 mmol/L    Anion Gap 7 4 - 13 mmol/L    BUN 7 5 - 25 mg/dL    Creatinine <0 15 (L) 0 60 - 1 30 mg/dL    Glucose 80 65 - 140 mg/dL    Calcium 8 3 8 3 - 10 1 mg/dL    eGFR  ml/min/1 73sq m       XR chest portable   Final Result      Limited study  Mild right basilar opacity representing atelectasis or developing pneumonia  Left basilar subsegmental atelectasis  Workstation performed: [id number]         FL barium swallow video w speech   Final Result      Large residuals in the valleculae and piriform sinuses  No evidence of laryngeal penetration or aspiration  Please see the Speech Therapist's report for a full description of findings and recommendations  Workstation performed: [id number]         XR chest portable ICU   Final Result   Slight worsening of bilateral alveolar infiltrates  Workstation performed: [id number]         XR chest portable   Final Result      Improved aeration of the left lung with some residual left lower lobe infiltrate or atelectasis  There is no pneumothorax           Workstation performed: [id number]         XR chest portable   Final Result      Completely opacified left lung likely related to left lung atelectasis, perhaps related to mucous plugging  ##imslh##imslh         Workstation performed: [id number]         XR chest portable ICU   Final Result   1  Evolving left lower lobe infiltrate representing atelectasis or pneumonia  2   Low-lying endotracheal tube with tip projecting towards the left mainstem bronchus  Repositioning is recommended  3   Malpositioned orogastric tube with tip projecting into the EG junction  Repositioning recommended  ##sigslh##sigslh         Workstation performed: [id number]         XR chest portable   Final Result      ET tube tip above the hong  Workstation performed: [id number]         XR chest portable   Final Result      No active pulmonary disease  Workstation performed: [id number]         XR skull < 4 vw   Final Result      No radiopaque foreign body  Workstation performed: [id number]         MRI brain w wo contrast   Final Result   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      No focal brain parenchymal abnormalities      No acute intracranial hemorrhage or mass effect      No pathologic enhancement               Workstation performed: [id number]         XR chest 1 view portable   Final Result   Diminished inspiration  No active pulmonary disease           Workstation performed: [id number]             Invasive Devices     Peripheral Intravenous Line            Peripheral IV 11/20/17 Right Wrist 2 days                      Assessment:  Acute respiratory failure with hypoxia resolved  Aspiration pneumonia sputum showing Moraxella catarrhalis resolved  Feeding problem secondary to aspiration the Guardian does not want PEG tube she would like patient be discharged to [facility] he may be able to eat over the familiar  surroundings  Atelectasis  Urinary tract infection culture revealing Enterococcus faecalis  Frequent falls with head injury  Postconcussion syndrome  Severe intellectual disability  Chronic dysphagia concern for recurrent aspiration  Lundberg catheter is out patient has not voided urine yet urology is following the patient  Urinary retention  Plan:  Continue with  IV fluids  Discontinue the antibiotics  Aspiration precautions  NPO except meds  IV fluids  Discussed with the nursing staff  Bladder scan every 2 hours and inform urologist the findings  PT evaluation and treatment  Discussed with Rod Lennox guardian for the patient at present time she does not want PEG tube to be placed    She prefers patient be tried with diet at the [facility]

## 2017-11-23 NOTE — PROGRESS NOTES
Progress Note - Kassidy Del Valle 61 y o  male MRN: [id number]    Unit/Bed#: [address] [place] [address] Encounter: [id number]        Subjective:   Chart reviewed patient evaluated  Lundberg catheter has been taken out patient has not voided urine yet  I spoke to the guardian regarding PEG tube today but she did not want it  Patient is still NPO  I asked for a repeat evaluation by the speech therapist for swallowing but unfortunately she had not seen the patient even though the consult was written at 12:44 p m  Jad Smith Review of Systems    Objective:     Vitals: Blood pressure 141/85, pulse 87, temperature (!) 96 8 °F (36 °C), temperature source Tympanic, resp  rate 18, height 5' 3" (1 6 m), weight 48 1 kg (106 lb 2 oz), SpO2 95 %  ,Body mass index is 18 8 kg/m²        Intake/Output Summary (Last 24 hours) at 11/22/17 1932  Last data filed at 11/22/17 0601   Gross per 24 hour   Intake                0 ml   Output              775 ml   Net             -775 ml         Current Facility-Administered Medications:     amitriptyline (ELAVIL) tablet 25 mg, 25 mg, Oral, HS, Alejandro Gould MD, 25 mg at 11/21/17 2144    ampicillin-sulbactam (UNASYN) 1 5 g in sodium chloride 0 9 % 50 mL IVPB, 1 5 g, Intravenous, Q6H, Olga Lidia Bryant MD, Last Rate: 100 mL/hr at 11/22/17 1727, 1 5 g at 11/22/17 1727    ARIPiprazole (ABILIFY) tablet 30 mg, 30 mg, Oral, Daily, Johnathon Balderas MD, 30 mg at 11/22/17 8340    aspirin chewable tablet 81 mg, 81 mg, Oral, Daily, Luisa Olivera PA-C, 81 mg at 11/22/17 0936    atorvastatin (LIPITOR) tablet 20 mg, 20 mg, Oral, HS, Johnathon Balderas MD, 20 mg at 11/21/17 2145    chlorhexidine (PERIDEX) 0 12 % oral rinse 15 mL, 15 mL, Swish & Spit, Q12H [address], Luisa Olivera PA-C, 15 mL at 11/22/17 3458    dextrose 5 % and sodium chloride 0 45 % infusion, 60 mL/hr, Intravenous, Continuous, Johnathon Balderas MD, Last Rate: 60 mL/hr at 11/22/17 1533, 60 mL/hr at 11/22/17 1533    divalproex sodium (DEPAKOTE) EC tablet 250 mg, 250 mg, Oral, HS, Johnathon Balderas MD, 250 mg at 11/21/17 2145    divalproex sodium (DEPAKOTE) EC tablet 750 mg, 750 mg, Oral, QAM, Johnathon Balderas MD, 750 mg at 11/22/17 0935    divalproex sodium (DEPAKOTE) EC tablet 750 mg, 750 mg, Oral, After Lunch, Johnathon Balderas MD, 750 mg at 11/22/17 1334    enoxaparin (LOVENOX) subcutaneous injection 40 mg, 40 mg, Subcutaneous, Q24H [address], Nano Ramírez MD, 40 mg at 11/22/17 0936    escitalopram (LEXAPRO) tablet 20 mg, 20 mg, Oral, Daily, Johnathon Balderas MD, 20 mg at 11/22/17 0936    ipratropium-albuterol (DUO-NEB) 0 5-2 5 mg/mL inhalation solution 3 mL, 3 mL, Nebulization, Q6H, Aubrie Bolanos PA-C, 3 mL at 11/22/17 1441    LORazepam (ATIVAN) 2 mg/mL injection 1 mg, 1 mg, Intravenous, Q4H PRN, Johnathon Balderas MD, 1 mg at 11/21/17 1627    polyethylene glycol (MIRALAX) packet 17 g, 17 g, Oral, Daily, Nano Ramírez MD, 17 g at 11/20/17 7393    QUEtiapine (SEROquel) tablet 100 mg, 100 mg, Oral, HS, Johnathon Balderas MD, 100 mg at 11/21/17 2144    senna-docusate sodium (SENOKOT S) 8 6-50 mg per tablet 1 tablet, 1 tablet, Oral, HS, Nano Ramírez MD, 1 tablet at 11/21/17 2144     Physical Exam   Constitutional:   Confused and disoriented periods of agitation   HENT:   Patient has ecchymotic areas on the forehead and also on the back of the head   Eyes: Conjunctivae are normal  Pupils are equal, round, and reactive to light  Neck: Neck supple  No thyromegaly present  Cardiovascular: Normal rate and regular rhythm  Pulmonary/Chest: No respiratory distress  He exhibits no tenderness  Transmitted sounds present   Abdominal: Soft  Bowel sounds are normal    Musculoskeletal: Normal range of motion  He exhibits no edema or deformity  Neurological: He is alert  Periods of agitation   Skin: Skin is warm and dry             Lab, Imaging and culture:     Recent Results (from the past 72 hour(s))   CBC and differential    Collection Time: 11/22/17  6:28 AM Result Value Ref Range    WBC 4 20 (L) 4 80 - 10 80 Thousand/uL    RBC 4 10 (L) 4 70 - 6 10 Million/uL    Hemoglobin 11 7 (L) 14 0 - 18 0 g/dL    Hematocrit 35 2 (L) 42 0 - 52 0 %    MCV 86 82 - 98 fL    MCH 28 5 27 0 - 31 0 pg    MCHC 33 2 31 4 - 37 4 g/dL    RDW 14 7 11 6 - 15 1 %    MPV 7 1 (L) 8 9 - 12 7 fL    Platelets 791 237 - 295 Thousands/uL    nRBC 0 /100 WBCs    Neutrophils Relative 71 43 - 75 %    Lymphocytes Relative 17 14 - 44 %    Monocytes Relative 8 4 - 12 %    Eosinophils Relative 4 0 - 6 %    Basophils Relative 1 0 - 1 %    Neutrophils Absolute 3 00 1 85 - 7 62 Thousands/µL    Lymphocytes Absolute 0 70 0 60 - 4 47 Thousands/µL    Monocytes Absolute 0 30 0 17 - 1 22 Thousand/µL    Eosinophils Absolute 0 20 0 00 - 0 61 Thousand/µL    Basophils Absolute 0 00 0 00 - 0 10 Thousands/µL   Basic metabolic panel    Collection Time: 11/22/17  6:28 AM   Result Value Ref Range    Sodium 143 136 - 145 mmol/L    Potassium 3 6 3 5 - 5 3 mmol/L    Chloride 106 100 - 108 mmol/L    CO2 30 21 - 32 mmol/L    Anion Gap 7 4 - 13 mmol/L    BUN 7 5 - 25 mg/dL    Creatinine <0 15 (L) 0 60 - 1 30 mg/dL    Glucose 80 65 - 140 mg/dL    Calcium 8 3 8 3 - 10 1 mg/dL    eGFR  ml/min/1 73sq m       XR chest portable   Final Result      Limited study  Mild right basilar opacity representing atelectasis or developing pneumonia  Left basilar subsegmental atelectasis  Workstation performed: [id number]         FL barium swallow video w speech   Final Result      Large residuals in the valleculae and piriform sinuses  No evidence of laryngeal penetration or aspiration  Please see the Speech Therapist's report for a full description of findings and recommendations  Workstation performed: [id number]         XR chest portable ICU   Final Result   Slight worsening of bilateral alveolar infiltrates           Workstation performed: [id number]         XR chest portable   Final Result      Improved aeration of the left lung with some residual left lower lobe infiltrate or atelectasis  There is no pneumothorax  Workstation performed: [id number]         XR chest portable   Final Result      Completely opacified left lung likely related to left lung atelectasis, perhaps related to mucous plugging  ##imslh##imslh         Workstation performed: [id number]         XR chest portable ICU   Final Result   1  Evolving left lower lobe infiltrate representing atelectasis or pneumonia  2   Low-lying endotracheal tube with tip projecting towards the left mainstem bronchus  Repositioning is recommended  3   Malpositioned orogastric tube with tip projecting into the EG junction  Repositioning recommended  ##sigslh##sigslh         Workstation performed: [id number]         XR chest portable   Final Result      ET tube tip above the hong  Workstation performed: [id number]         XR chest portable   Final Result      No active pulmonary disease  Workstation performed: [id number]         XR skull < 4 vw   Final Result      No radiopaque foreign body  Workstation performed: [id number]         MRI brain w wo contrast   Final Result   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      No focal brain parenchymal abnormalities      No acute intracranial hemorrhage or mass effect      No pathologic enhancement               Workstation performed: [id number]         XR chest 1 view portable   Final Result   Diminished inspiration  No active pulmonary disease           Workstation performed: [id number]             Invasive Devices     Peripheral Intravenous Line            Peripheral IV 11/20/17 Right Wrist 1 day                      Assessment:  Acute respiratory failure with hypoxia resolved  Aspiration pneumonia sputum showing Moraxella catarrhalis  Feeding problem secondary to aspiration the Guardian does not want PEG tube she would like patient be discharged to [facility] he may be able to eat over the  surroundings  Atelectasis  Urinary tract infection culture revealing Enterococcus faecalis  Frequent falls with head injury  Postconcussion syndrome  Severe intellectual disability  Chronic dysphagia concern for recurrent aspiration  Lundberg catheter is out patient has not voided urine yet urology is following the patient  Plan:  Continue with  IV fluids  Discontinue the antibiotics  Aspiration precautions  NPO except meds  IV fluids  Discussed with the nursing staff  PT evaluation and treatment  Discussed with Taylor Chan guardian for the patient at present time she does not want PEG tube to be placed    She prefers patient be tried with diet at the [facility]

## 2017-11-23 NOTE — PROGRESS NOTES
Patient examined discussed with [facility] and spoke to the nurse  She reports that patient has not voided since Lundberg catheter was removed yesterday and she reported that BS of 541 mL  Patient is resting in the bed patient remained psychotic demented confused inappropriate peers of agitation and on one-to-one observation  Inappropriate response by raising the hand or no response to simple verbal command  No meaningful communications  Patient is getting his psychotropic medications  No side effects of medications noted  I reviewed his psychotropic medications  Dr Tima Mckeon reports that guardian does not want the PEG tube and they will try to feed him at [facility] after the discharge  At this time I will continue his current psychotropic medications the same  Patient will need psychiatric follow-up after the discharge  Thank you very much

## 2017-11-24 PROCEDURE — 92526 ORAL FUNCTION THERAPY: CPT

## 2017-11-24 PROCEDURE — 94640 AIRWAY INHALATION TREATMENT: CPT

## 2017-11-24 PROCEDURE — 97116 GAIT TRAINING THERAPY: CPT

## 2017-11-24 PROCEDURE — 94760 N-INVAS EAR/PLS OXIMETRY 1: CPT

## 2017-11-24 RX ADMIN — DIVALPROEX SODIUM 750 MG: 250 TABLET, DELAYED RELEASE ORAL at 09:15

## 2017-11-24 RX ADMIN — ASPIRIN 81 MG 81 MG: 81 TABLET ORAL at 09:15

## 2017-11-24 RX ADMIN — ENOXAPARIN SODIUM 40 MG: 40 INJECTION SUBCUTANEOUS at 09:14

## 2017-11-24 RX ADMIN — DEXTROSE AND SODIUM CHLORIDE 60 ML/HR: 5; 450 INJECTION, SOLUTION INTRAVENOUS at 02:54

## 2017-11-24 RX ADMIN — IPRATROPIUM BROMIDE AND ALBUTEROL SULFATE 3 ML: .5; 3 SOLUTION RESPIRATORY (INHALATION) at 02:24

## 2017-11-24 RX ADMIN — DIVALPROEX SODIUM 250 MG: 250 TABLET, DELAYED RELEASE ORAL at 21:58

## 2017-11-24 RX ADMIN — DIVALPROEX SODIUM 750 MG: 250 TABLET, DELAYED RELEASE ORAL at 16:29

## 2017-11-24 RX ADMIN — IPRATROPIUM BROMIDE AND ALBUTEROL SULFATE 3 ML: .5; 3 SOLUTION RESPIRATORY (INHALATION) at 08:25

## 2017-11-24 RX ADMIN — QUETIAPINE FUMARATE 100 MG: 100 TABLET ORAL at 21:58

## 2017-11-24 RX ADMIN — IPRATROPIUM BROMIDE AND ALBUTEROL SULFATE 3 ML: .5; 3 SOLUTION RESPIRATORY (INHALATION) at 13:09

## 2017-11-24 RX ADMIN — Medication 1 TABLET: at 21:58

## 2017-11-24 RX ADMIN — LORAZEPAM 1 MG: 2 INJECTION INTRAMUSCULAR; INTRAVENOUS at 09:09

## 2017-11-24 RX ADMIN — IPRATROPIUM BROMIDE AND ALBUTEROL SULFATE 3 ML: .5; 3 SOLUTION RESPIRATORY (INHALATION) at 22:15

## 2017-11-24 RX ADMIN — CHLORHEXIDINE GLUCONATE 15 ML: 1.2 RINSE ORAL at 21:56

## 2017-11-24 RX ADMIN — ARIPIPRAZOLE 30 MG: 10 TABLET ORAL at 09:15

## 2017-11-24 RX ADMIN — ESCITALOPRAM OXALATE 20 MG: 10 TABLET ORAL at 09:15

## 2017-11-24 RX ADMIN — AMITRIPTYLINE HYDROCHLORIDE 25 MG: 10 TABLET, FILM COATED ORAL at 21:56

## 2017-11-24 RX ADMIN — ATORVASTATIN CALCIUM 20 MG: 20 TABLET, FILM COATED ORAL at 21:59

## 2017-11-24 NOTE — PROGRESS NOTES
Progress Note - Nick Blizzard 61 y o  male MRN: [id number]    Unit/Bed#: [address] [place] [address] Encounter: [id number]        Subjective:   Patient is very restless  He has been incontinent of urine  His still NPO waiting for the speech therapist evaluation regarding swallowing  Discussed with the nursing staff  Review of Systems    Objective:     Vitals: Blood pressure 103/70, pulse 80, temperature (!) 96 2 °F (35 7 °C), temperature source Tympanic, resp  rate 19, height 5' 3" (1 6 m), weight 48 4 kg (106 lb 11 2 oz), SpO2 91 %  ,Body mass index is 18 9 kg/m²      No intake or output data in the 24 hours ending 11/24/17 1037      Current Facility-Administered Medications:     amitriptyline (ELAVIL) tablet 25 mg, 25 mg, Oral, HS, Hitesh Perez MD, 25 mg at 11/23/17 2145    ARIPiprazole (ABILIFY) tablet 30 mg, 30 mg, Oral, Daily, Johnathon Balderas MD, 30 mg at 11/24/17 0915    aspirin chewable tablet 81 mg, 81 mg, Oral, Daily, Luisa Olivera PA-C, 81 mg at 11/24/17 0915    atorvastatin (LIPITOR) tablet 20 mg, 20 mg, Oral, HS, Johnathon Balderas MD, 20 mg at 11/23/17 2145    chlorhexidine (PERIDEX) 0 12 % oral rinse 15 mL, 15 mL, Swish & Spit, Q12H [address], Luisa Olivera PA-C, 15 mL at 11/23/17 2146    dextrose 5 % and sodium chloride 0 45 % infusion, 60 mL/hr, Intravenous, Continuous, Johnathon Balderas MD, Last Rate: 60 mL/hr at 11/24/17 0254, 60 mL/hr at 11/24/17 0254    divalproex sodium (DEPAKOTE) EC tablet 250 mg, 250 mg, Oral, HS, Johnathon Balderas MD, 250 mg at 11/23/17 2145    divalproex sodium (DEPAKOTE) EC tablet 750 mg, 750 mg, Oral, QAM, Johnathon Balderas MD, 750 mg at 11/24/17 0915    divalproex sodium (DEPAKOTE) EC tablet 750 mg, 750 mg, Oral, After Lunch, Johnathon Balderas MD, 750 mg at 11/23/17 1533    enoxaparin (LOVENOX) subcutaneous injection 40 mg, 40 mg, Subcutaneous, Q24H [address], Flor Maldonado MD, 40 mg at 11/24/17 0914    escitalopram (LEXAPRO) tablet 20 mg, 20 mg, Oral, Daily, Johnathon Balderas, MD, 20 mg at 11/24/17 0915    ipratropium-albuterol (DUO-NEB) 0 5-2 5 mg/mL inhalation solution 3 mL, 3 mL, Nebulization, Q6H, Lizette Sheldon PA-C, 3 mL at 11/24/17 0825    LORazepam (ATIVAN) 2 mg/mL injection 1 mg, 1 mg, Intravenous, Q4H PRN, Johnathon Balderas MD, 1 mg at 11/24/17 0909    polyethylene glycol (MIRALAX) packet 17 g, 17 g, Oral, Daily, Olga Lidia Bryant MD, 17 g at 11/20/17 6342    QUEtiapine (SEROquel) tablet 100 mg, 100 mg, Oral, HS, Johnathon Balderas MD, 100 mg at 11/23/17 2145    senna-docusate sodium (SENOKOT S) 8 6-50 mg per tablet 1 tablet, 1 tablet, Oral, HS, Olga Lidia Bryant MD, 1 tablet at 11/23/17 2149     Physical Exam   Constitutional:   Confused and disoriented periods of agitation   HENT:   Patient has ecchymotic areas on the forehead and also on the back of the head   Eyes: Conjunctivae are normal  Pupils are equal, round, and reactive to light  Neck: Neck supple  No thyromegaly present  Cardiovascular: Normal rate and regular rhythm  Pulmonary/Chest: No respiratory distress  He exhibits no tenderness  Transmitted sounds present   Abdominal: Soft  Bowel sounds are normal    Musculoskeletal: Normal range of motion  He exhibits no edema or deformity  Neurological: He is alert  Periods of agitation   Skin: Skin is warm and dry             Lab, Imaging and culture:     Recent Results (from the past 72 hour(s))   CBC and differential    Collection Time: 11/22/17  6:28 AM   Result Value Ref Range    WBC 4 20 (L) 4 80 - 10 80 Thousand/uL    RBC 4 10 (L) 4 70 - 6 10 Million/uL    Hemoglobin 11 7 (L) 14 0 - 18 0 g/dL    Hematocrit 35 2 (L) 42 0 - 52 0 %    MCV 86 82 - 98 fL    MCH 28 5 27 0 - 31 0 pg    MCHC 33 2 31 4 - 37 4 g/dL    RDW 14 7 11 6 - 15 1 %    MPV 7 1 (L) 8 9 - 12 7 fL    Platelets 394 888 - 335 Thousands/uL    nRBC 0 /100 WBCs    Neutrophils Relative 71 43 - 75 %    Lymphocytes Relative 17 14 - 44 %    Monocytes Relative 8 4 - 12 %    Eosinophils Relative 4 0 - 6 %    Basophils Relative 1 0 - 1 %    Neutrophils Absolute 3 00 1 85 - 7 62 Thousands/µL    Lymphocytes Absolute 0 70 0 60 - 4 47 Thousands/µL    Monocytes Absolute 0 30 0 17 - 1 22 Thousand/µL    Eosinophils Absolute 0 20 0 00 - 0 61 Thousand/µL    Basophils Absolute 0 00 0 00 - 0 10 Thousands/µL   Basic metabolic panel    Collection Time: 11/22/17  6:28 AM   Result Value Ref Range    Sodium 143 136 - 145 mmol/L    Potassium 3 6 3 5 - 5 3 mmol/L    Chloride 106 100 - 108 mmol/L    CO2 30 21 - 32 mmol/L    Anion Gap 7 4 - 13 mmol/L    BUN 7 5 - 25 mg/dL    Creatinine <0 15 (L) 0 60 - 1 30 mg/dL    Glucose 80 65 - 140 mg/dL    Calcium 8 3 8 3 - 10 1 mg/dL    eGFR  ml/min/1 73sq m       XR chest portable   Final Result      Limited study  Mild right basilar opacity representing atelectasis or developing pneumonia  Left basilar subsegmental atelectasis  Workstation performed: [id number]         FL barium swallow video w speech   Final Result      Large residuals in the valleculae and piriform sinuses  No evidence of laryngeal penetration or aspiration  Please see the Speech Therapist's report for a full description of findings and recommendations  Workstation performed: [id number]         XR chest portable ICU   Final Result   Slight worsening of bilateral alveolar infiltrates  Workstation performed: [id number]         XR chest portable   Final Result      Improved aeration of the left lung with some residual left lower lobe infiltrate or atelectasis  There is no pneumothorax  Workstation performed: [id number]         XR chest portable   Final Result      Completely opacified left lung likely related to left lung atelectasis, perhaps related to mucous plugging  ##[email or web address]##imslh         Workstation performed: [id number]         XR chest portable ICU   Final Result   1  Evolving left lower lobe infiltrate representing atelectasis or pneumonia     2   Low-lying endotracheal tube with tip projecting towards the left mainstem bronchus  Repositioning is recommended  3   Malpositioned orogastric tube with tip projecting into the EG junction  Repositioning recommended  ##[email or web address]##sigslh         Workstation performed: [id number]         XR chest portable   Final Result      ET tube tip above the hong  Workstation performed: [id number]         XR chest portable   Final Result      No active pulmonary disease  Workstation performed: [id number]         XR skull < 4 vw   Final Result      No radiopaque foreign body  Workstation performed: [id number]         MRI brain w wo contrast   Final Result   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      No focal brain parenchymal abnormalities      No acute intracranial hemorrhage or mass effect      No pathologic enhancement               Workstation performed: [id number]         XR chest 1 view portable   Final Result   Diminished inspiration  No active pulmonary disease  Workstation performed: [id number]             Invasive Devices     Peripheral Intravenous Line            Peripheral IV 11/23/17 Right Antecubital less than 1 day                      Assessment:  Acute respiratory failure with hypoxia resolved  Aspiration pneumonia sputum showing Moraxella catarrhalis resolved  Feeding problem secondary to aspiration the Guardian does not want PEG tube she would like patient be discharged to [facility] he may be able to eat over the familiar  surroundings  Atelectasis  Urinary tract infection culture revealing Enterococcus faecalis  Frequent falls with head injury  Postconcussion syndrome  Severe intellectual disability  Chronic dysphagia concern for recurrent aspiration  Lundberg catheter is out patient has not voided urine yet urology is following the patient    Urinary retention  Plan:  Continue with  IV fluids  Discontinue the antibiotics  Aspiration precautions  NPO except meds  IV fluids  Discussed with the nursing staff with regards to the swallowing the will call me today once speech therapist  sees the patient and then decide about the discharge plan  PT evaluation and treatment  Discussed with Christiano Buckner guardian for the patient at present time she does not want PEG tube to be placed    She prefers patient be tried with diet at the [facility]  Speech therapy evaluation still pending even though the consult was ordered couple of times

## 2017-11-24 NOTE — SPEECH THERAPY NOTE
SLP  Swallow Therapy Note    S:  Patient alert, restless in bed  No pain evident  MD requested swallow re-evaluation as PEG tube placement has been refused by patient's guardian  O:  NPO status except for medications crushed in puree bolus has been maintained  Swallow reassessment conducted  Patient accepted 4 oz of pureed apple sauce via spoon with adequate oral reception and containment  Timely oral preparation, bolus formation, transit, and swallow trigger observed  Multiple swallows observed with reduced laryngeal excursion yielding/ indicating pharyngeal residuals  Patient developed gargling, wet vocal quality/ cough, and began coughing strongly as PO intakes of apple sauce (puree) continued  Coughing was then observed consistently following all swallows as PO feedings progressed  A:  Patient displays overt signs and symptoms of aspiration immediately post swallows of puree  Suspect secondary to build up and over flow of residuals in the pharyngeal ports with continued intakes  Patient has failed multiple PO diet trials of puree and pudding thick liquids and is deemed unsafe for oral intake due to high risk for aspiration of all POs, even a most conservative diet consistency of pureed solids and pudding thick liquids  P:  Recommend:  1  Maintain strict NPO status  2   Medications crushed in a puree bolus as tolerated  3   Increase oral hygiene  4   Consider alternate means of nutritional support long term vs  Palliative care measures at this point  D/w patient's RNs and aide at bedside  Patient requires nutritional support  Discharge ST today as patient is not progressing toward goals and further skilled ST is not warranted at this time  Please re-consult PRN  Thank you

## 2017-11-24 NOTE — PROGRESS NOTES
Patient examined spoke with the nurse a medical progress note reviewed and noted id the patient is waiting for the speech therapist to evaluate for the swallow study  Patient also have urinary incontinence  Patient remained on one-to-one observation for the safety patient is resting in the bed demented psychotic gets restless inappropriate response or no response to verbal command no meaningful communications  Patient has high tolerance to the medications he needs psychotropic medications to control his behavior no side effects of Elavil Abilify Lexapro and Depakote noted  Nurse reports that he is the same he needs help with ADL care he is not aware of his problems limitations and needs  Medical evaluation and treatment is in progress  I will continue his psychotropic medications  Patient will need psychiatric follow-up after the discharge  I will follow up  Thank you very much

## 2017-11-24 NOTE — PLAN OF CARE
Problem: RESPIRATORY - ADULT  Goal: Achieves optimal ventilation and oxygenation  INTERVENTIONS:  - Assess for changes in respiratory status  - Assess for changes in mentation and behavior  - Position to facilitate oxygenation and minimize respiratory effort  - Oxygen administration by appropriate delivery method based on oxygen saturation (per order) or ABG  - Assess the need for suctioning and aspirate as needed  - Respiratory Therapy support as indicated    Outcome: Progressing

## 2017-11-24 NOTE — PHYSICAL THERAPY NOTE
PT TREATMENT     11/24/17 1515   Pain Assessment   Pain Assessment Montejo-Baker FACES   Montejo-Baker FACES Pain Rating 0   Restrictions/Precautions   Other Precautions Cognitive;1:1;Fall Risk;Bed Alarm; Chair Alarm; Impulsive  (Mayes belt;hand mits)   General   Chart Reviewed Yes   Family/Caregiver Present No   Cognition   Overall Cognitive Status Impaired   Subjective   Subjective No intelligable speech   Bed Mobility   Supine to Sit 3  Moderate assistance   Additional items Assist x 2   Sit to Supine 3  Moderate assistance   Additional items Assist x 2   Transfers   Sit to Stand 2  Maximal assistance   Additional items Assist x 2   Stand to Sit 2  Maximal assistance   Additional items Assist x 2   Ambulation/Elevation   Gait pattern Forward Flexion;Narrow QASIM  (unsteady)   Gait Assistance 2  Maximal assist   Additional items Assist x 2   Assistive Device None  (max hand hold A + 2)   Distance 20 feet;change in direction;assist for weight shifting   Assessment   Assessment Pt with good tolerance to bed mob, trans and amb  Pt unpredictable due to cognition  Plan   Treatment/Interventions ADL retraining;Functional transfer training;LE strengthening/ROM; Therapeutic exercise; Endurance training;Patient/family training;Cognitive reorientation; Bed mobility;Gait training   PT Frequency (3-5/wk)   Recommendation   Recommendation (Return to [facility] with PT services)

## 2017-11-24 NOTE — CASE MANAGEMENT
Continued Stay Review    Date: 11/22/17    Vitals: Blood pressure 141/85, pulse 87, temperature (!) 96 8 °F (36 °C), temperature source Tympanic, resp  rate 18, height 5' 3" (1 6 m), weight 48 1 kg (106 lb 2 oz), SpO2 95 %  ,Body mass index is 18 8 kg/m²  Medications:   Scheduled Meds:   amitriptyline 25 mg Oral HS   ARIPiprazole 30 mg Oral Daily   aspirin 81 mg Oral Daily   atorvastatin 20 mg Oral HS   chlorhexidine 15 mL Swish & Spit Q12H [address]   divalproex sodium 250 mg Oral HS   divalproex sodium 750 mg Oral QAM   divalproex sodium 750 mg Oral After Lunch   enoxaparin 40 mg Subcutaneous Q24H SHERICE   escitalopram 20 mg Oral Daily   ipratropium-albuterol 3 mL Nebulization Q6H   polyethylene glycol 17 g Oral Daily   QUEtiapine 100 mg Oral HS   senna-docusate sodium 1 tablet Oral HS     Continuous Infusions:   dextrose 5 % and sodium chloride 0 45 % 60 mL/hr Last Rate: 60 mL/hr (11/24/17 0254)     PRN Meds: LORazepam    Abnormal Labs/Diagnostic Results:   WBC 4 20 HGB 11 7 CR <0 15     Age/Sex: 61 y o  male     Assessment/Plan:   Subjective:   Chart reviewed patient evaluated  Lundberg catheter has been taken out patient has not voided urine yet  I spoke to the guardian regarding PEG tube today but she did not want it  Patient is still NPO    Assessment:  Acute respiratory failure with hypoxia resolved  Aspiration pneumonia sputum showing Moraxella catarrhalis  Feeding problem secondary to aspiration the Guardian does not want PEG tube she would like patient be discharged to [facility] he may be able to eat over the  surroundings  Atelectasis  Urinary tract infection culture revealing Enterococcus faecalis  Frequent falls with head injury  Postconcussion syndrome  Severe intellectual disability  Chronic dysphagia concern for recurrent aspiration  Lundberg catheter is out patient has not voided urine yet urology is following the patient      Plan:  Continue with  IV fluids  Discontinue the antibiotics  Aspiration precautions  NPO except meds  IV fluids  Discussed with the nursing staff  PT evaluation and treatment  Discussed with Sylvia Hernandez guardian for the patient at present time she does not want PEG tube to be placed    She prefers patient be tried with diet at the [facility]  Discharge Plan: RETURN TO [facility]

## 2017-11-25 LAB
ALBUMIN SERPL BCP-MCNC: 2.4 G/DL (ref 3.5–5)
ALP SERPL-CCNC: 118 U/L (ref 46–116)
ALT SERPL W P-5'-P-CCNC: 48 U/L (ref 12–78)
ANION GAP SERPL CALCULATED.3IONS-SCNC: 9 MMOL/L (ref 4–13)
AST SERPL W P-5'-P-CCNC: 51 U/L (ref 5–45)
BASOPHILS # BLD AUTO: 0 THOUSANDS/ΜL (ref 0–0.1)
BASOPHILS NFR BLD AUTO: 0 % (ref 0–1)
BILIRUB SERPL-MCNC: 0.4 MG/DL (ref 0.2–1)
BUN SERPL-MCNC: 7 MG/DL (ref 5–25)
CALCIUM SERPL-MCNC: 8.4 MG/DL (ref 8.3–10.1)
CHLORIDE SERPL-SCNC: 108 MMOL/L (ref 100–108)
CO2 SERPL-SCNC: 31 MMOL/L (ref 21–32)
CREAT SERPL-MCNC: 0.17 MG/DL (ref 0.6–1.3)
EOSINOPHIL # BLD AUTO: 0.1 THOUSAND/ΜL (ref 0–0.61)
EOSINOPHIL NFR BLD AUTO: 1 % (ref 0–6)
ERYTHROCYTE [DISTWIDTH] IN BLOOD BY AUTOMATED COUNT: 14.9 % (ref 11.6–15.1)
GFR SERPL CREATININE-BSD FRML MDRD: 186 ML/MIN/1.73SQ M
GLUCOSE SERPL-MCNC: 67 MG/DL (ref 65–140)
HCT VFR BLD AUTO: 35.7 % (ref 42–52)
HGB BLD-MCNC: 12 G/DL (ref 14–18)
LYMPHOCYTES # BLD AUTO: 0.9 THOUSANDS/ΜL (ref 0.6–4.47)
LYMPHOCYTES NFR BLD AUTO: 20 % (ref 14–44)
MCH RBC QN AUTO: 28.7 PG (ref 27–31)
MCHC RBC AUTO-ENTMCNC: 33.6 G/DL (ref 31.4–37.4)
MCV RBC AUTO: 85 FL (ref 82–98)
MONOCYTES # BLD AUTO: 0.3 THOUSAND/ΜL (ref 0.17–1.22)
MONOCYTES NFR BLD AUTO: 7 % (ref 4–12)
NEUTROPHILS # BLD AUTO: 3.1 THOUSANDS/ΜL (ref 1.85–7.62)
NEUTS SEG NFR BLD AUTO: 72 % (ref 43–75)
PLATELET # BLD AUTO: 145 THOUSANDS/UL (ref 130–400)
PMV BLD AUTO: 7.3 FL (ref 8.9–12.7)
POTASSIUM SERPL-SCNC: 3.3 MMOL/L (ref 3.5–5.3)
PROT SERPL-MCNC: 6.1 G/DL (ref 6.4–8.2)
RBC # BLD AUTO: 4.18 MILLION/UL (ref 4.7–6.1)
SODIUM SERPL-SCNC: 148 MMOL/L (ref 136–145)
WBC # BLD AUTO: 4.4 THOUSAND/UL (ref 4.8–10.8)

## 2017-11-25 PROCEDURE — 85025 COMPLETE CBC W/AUTO DIFF WBC: CPT | Performed by: INTERNAL MEDICINE

## 2017-11-25 PROCEDURE — 94760 N-INVAS EAR/PLS OXIMETRY 1: CPT

## 2017-11-25 PROCEDURE — 92526 ORAL FUNCTION THERAPY: CPT

## 2017-11-25 PROCEDURE — 80053 COMPREHEN METABOLIC PANEL: CPT | Performed by: INTERNAL MEDICINE

## 2017-11-25 PROCEDURE — 94640 AIRWAY INHALATION TREATMENT: CPT

## 2017-11-25 RX ORDER — DEXTROSE, SODIUM CHLORIDE, AND POTASSIUM CHLORIDE 5; .2; .15 G/100ML; G/100ML; G/100ML
75 INJECTION INTRAVENOUS CONTINUOUS
Status: DISCONTINUED | OUTPATIENT
Start: 2017-11-25 | End: 2017-11-28 | Stop reason: HOSPADM

## 2017-11-25 RX ADMIN — DIVALPROEX SODIUM 750 MG: 250 TABLET, DELAYED RELEASE ORAL at 14:52

## 2017-11-25 RX ADMIN — ATORVASTATIN CALCIUM 20 MG: 20 TABLET, FILM COATED ORAL at 21:32

## 2017-11-25 RX ADMIN — IPRATROPIUM BROMIDE AND ALBUTEROL SULFATE 3 ML: .5; 3 SOLUTION RESPIRATORY (INHALATION) at 02:48

## 2017-11-25 RX ADMIN — CHLORHEXIDINE GLUCONATE 15 ML: 1.2 RINSE ORAL at 08:45

## 2017-11-25 RX ADMIN — ENOXAPARIN SODIUM 40 MG: 40 INJECTION SUBCUTANEOUS at 08:46

## 2017-11-25 RX ADMIN — DIVALPROEX SODIUM 250 MG: 250 TABLET, DELAYED RELEASE ORAL at 21:33

## 2017-11-25 RX ADMIN — AMITRIPTYLINE HYDROCHLORIDE 25 MG: 10 TABLET, FILM COATED ORAL at 21:29

## 2017-11-25 RX ADMIN — QUETIAPINE FUMARATE 100 MG: 100 TABLET ORAL at 21:32

## 2017-11-25 RX ADMIN — ASPIRIN 81 MG 81 MG: 81 TABLET ORAL at 08:45

## 2017-11-25 RX ADMIN — CHLORHEXIDINE GLUCONATE 15 ML: 1.2 RINSE ORAL at 21:32

## 2017-11-25 RX ADMIN — DEXTROSE, SODIUM CHLORIDE, AND POTASSIUM CHLORIDE 75 ML/HR: 5; .2; .15 INJECTION INTRAVENOUS at 14:24

## 2017-11-25 RX ADMIN — IPRATROPIUM BROMIDE AND ALBUTEROL SULFATE 3 ML: .5; 3 SOLUTION RESPIRATORY (INHALATION) at 13:55

## 2017-11-25 RX ADMIN — Medication 1 TABLET: at 21:33

## 2017-11-25 RX ADMIN — IPRATROPIUM BROMIDE AND ALBUTEROL SULFATE 3 ML: .5; 3 SOLUTION RESPIRATORY (INHALATION) at 20:29

## 2017-11-25 RX ADMIN — ESCITALOPRAM OXALATE 20 MG: 10 TABLET ORAL at 08:45

## 2017-11-25 RX ADMIN — IPRATROPIUM BROMIDE AND ALBUTEROL SULFATE 3 ML: .5; 3 SOLUTION RESPIRATORY (INHALATION) at 07:51

## 2017-11-25 RX ADMIN — DIVALPROEX SODIUM 750 MG: 250 TABLET, DELAYED RELEASE ORAL at 08:46

## 2017-11-25 RX ADMIN — ARIPIPRAZOLE 30 MG: 10 TABLET ORAL at 08:45

## 2017-11-25 NOTE — SPEECH THERAPY NOTE
SLP  Swallow Therapy Note    10:45 AM  Spoke with Dr Kurtis Ahn regarding results of swallow reassessment yesterday and patient's severe oral pharyngeal dysphagia warranting continued NPO status with consideration of alternate means of nutritional support long term due to high risk for aspiration of all POs, even on a most conservative diet of puree and pudding thick liquids  Multiple attempts with a PO diet (puree/ pudding thick liquids) revealed overt s/s of aspiration including gargling, strong coughing, respiratory changes, and need for suctioning to clear residuals of foods from pharynx  Patient is at significant risk for aspiration of all POs  Dr Kurtis Ahn to speak with patient's Guardian again and [place] [address] [place] regarding same as preparing patient for discharge  D/w Dr Kurtis Ahn   Discharge ST today as no further skilled intervention needed at this time

## 2017-11-25 NOTE — PROGRESS NOTES
Patient examined spoke with Dr Benigno Vee patient failed the swallow study and PEG tube was recommended he has been communicating with [facility] and the guardian about the recommendation for PEG tube  I discussed with them  Medical treatment is in progress  Patient examined spoke with the staff and the nurse  Patient remains same patient is on one-to-one observation inappropriate response to verbal command  No meaningful communications  Patient gets agitated and unable to redirect him patient is medications to calm him down  Patient is mentally challenged psychotic demented depressed and he has been on lots of psychotropic medications  Unable to reduce any of his medications  No side effects of medications noted  Patient is not lethargic  I reviewed all his psychotropic medications  I will continue medications the same as ordered at this time and follow up  Thank you very much

## 2017-11-25 NOTE — PROGRESS NOTES
Progress Note - Angelica Gutierres 61 y o  male MRN: [id number]    Unit/Bed#: [address] [place] [address] Encounter: [id number]        Subjective:   Chart reviewed patient evaluated discussed with the speech therapist   Patient is currently NPO except meds  Still there is a concern with his swallowing because of increased risk of aspiration  Guardian does not want PEG tube  Lab revealed sodium 148 potassium 3 3  Review of Systems    Objective:     Vitals: Blood pressure 101/69, pulse 89, temperature 98 2 °F (36 8 °C), temperature source Tympanic, resp  rate 18, height 5' 3" (1 6 m), weight 48 4 kg (106 lb 11 2 oz), SpO2 96 %  ,Body mass index is 18 9 kg/m²      No intake or output data in the 24 hours ending 11/25/17 1119      Current Facility-Administered Medications:     amitriptyline (ELAVIL) tablet 25 mg, 25 mg, Oral, HS, Salvatore Coombs MD, 25 mg at 11/24/17 2156    ARIPiprazole (ABILIFY) tablet 30 mg, 30 mg, Oral, Daily, Johnathon Balderas MD, 30 mg at 11/25/17 0845    aspirin chewable tablet 81 mg, 81 mg, Oral, Daily, Luisa Olivera PA-C, 81 mg at 11/25/17 0845    atorvastatin (LIPITOR) tablet 20 mg, 20 mg, Oral, HS, Johnathon Balderas MD, 20 mg at 11/24/17 2159    chlorhexidine (PERIDEX) 0 12 % oral rinse 15 mL, 15 mL, Swish & Spit, Q12H [address], Luisa Olivera PA-C, 15 mL at 11/25/17 0845    dextrose 5 % and sodium chloride 0 2 % with KCl 20 mEq/L infusion, 75 mL/hr, Intravenous, Continuous, Johnathon Balderas MD    divalproex sodium (DEPAKOTE) EC tablet 250 mg, 250 mg, Oral, HS, Johnathon Balderas MD, 250 mg at 11/24/17 2158    divalproex sodium (DEPAKOTE) EC tablet 750 mg, 750 mg, Oral, QAM, Johnathon Balderas MD, 750 mg at 11/25/17 0846    divalproex sodium (DEPAKOTE) EC tablet 750 mg, 750 mg, Oral, After Lunch, Johnathon Balderas MD, 750 mg at 11/24/17 1629    enoxaparin (LOVENOX) subcutaneous injection 40 mg, 40 mg, Subcutaneous, Q24H [address], Andree Herrera MD, 40 mg at 11/25/17 0846    escitalopram (LEXAPRO) tablet 20 mg, 20 mg, Oral, Daily, Johnathon Balderas MD, 20 mg at 11/25/17 0845    ipratropium-albuterol (DUO-NEB) 0 5-2 5 mg/mL inhalation solution 3 mL, 3 mL, Nebulization, Q6H, Marcelo Precise, PA-C, 3 mL at 11/25/17 0751    LORazepam (ATIVAN) 2 mg/mL injection 1 mg, 1 mg, Intravenous, Q4H PRN, Johnathon Balderas MD, 1 mg at 11/24/17 0909    polyethylene glycol (MIRALAX) packet 17 g, 17 g, Oral, Daily, Jordyn Nunn MD, 17 g at 11/20/17 4810    QUEtiapine (SEROquel) tablet 100 mg, 100 mg, Oral, HS, Johnathon Balderas MD, 100 mg at 11/24/17 2158    senna-docusate sodium (SENOKOT S) 8 6-50 mg per tablet 1 tablet, 1 tablet, Oral, HS, Jordyn Nunn MD, 1 tablet at 11/24/17 2158     Physical Exam   Constitutional:   Confused and disoriented periods of agitation   HENT:   Patient has ecchymotic areas on the forehead and also on the back of the head   Eyes: Conjunctivae are normal  Pupils are equal, round, and reactive to light  Neck: Neck supple  No thyromegaly present  Cardiovascular: Normal rate and regular rhythm  Pulmonary/Chest: No respiratory distress  He exhibits no tenderness  Transmitted sounds present   Abdominal: Soft  Bowel sounds are normal    Musculoskeletal: Normal range of motion  He exhibits no edema or deformity  Neurological: He is alert  Periods of agitation   Skin: Skin is warm and dry             Lab, Imaging and culture:     Recent Results (from the past 72 hour(s))   CBC and differential    Collection Time: 11/25/17  5:24 AM   Result Value Ref Range    WBC 4 40 (L) 4 80 - 10 80 Thousand/uL    RBC 4 18 (L) 4 70 - 6 10 Million/uL    Hemoglobin 12 0 (L) 14 0 - 18 0 g/dL    Hematocrit 35 7 (L) 42 0 - 52 0 %    MCV 85 82 - 98 fL    MCH 28 7 27 0 - 31 0 pg    MCHC 33 6 31 4 - 37 4 g/dL    RDW 14 9 11 6 - 15 1 %    MPV 7 3 (L) 8 9 - 12 7 fL    Platelets 736 271 - 965 Thousands/uL    Neutrophils Relative 72 43 - 75 %    Lymphocytes Relative 20 14 - 44 %    Monocytes Relative 7 4 - 12 % Eosinophils Relative 1 0 - 6 %    Basophils Relative 0 0 - 1 %    Neutrophils Absolute 3 10 1 85 - 7 62 Thousands/µL    Lymphocytes Absolute 0 90 0 60 - 4 47 Thousands/µL    Monocytes Absolute 0 30 0 17 - 1 22 Thousand/µL    Eosinophils Absolute 0 10 0 00 - 0 61 Thousand/µL    Basophils Absolute 0 00 0 00 - 0 10 Thousands/µL   Comprehensive metabolic panel    Collection Time: 11/25/17  5:24 AM   Result Value Ref Range    Sodium 148 (H) 136 - 145 mmol/L    Potassium 3 3 (L) 3 5 - 5 3 mmol/L    Chloride 108 100 - 108 mmol/L    CO2 31 21 - 32 mmol/L    Anion Gap 9 4 - 13 mmol/L    BUN 7 5 - 25 mg/dL    Creatinine 0 17 (L) 0 60 - 1 30 mg/dL    Glucose 67 65 - 140 mg/dL    Calcium 8 4 8 3 - 10 1 mg/dL    AST 51 (H) 5 - 45 U/L    ALT 48 12 - 78 U/L    Alkaline Phosphatase 118 (H) 46 - 116 U/L    Total Protein 6 1 (L) 6 4 - 8 2 g/dL    Albumin 2 4 (L) 3 5 - 5 0 g/dL    Total Bilirubin 0 40 0 20 - 1 00 mg/dL    eGFR 186 ml/min/1 73sq m       XR chest portable   Final Result      Limited study  Mild right basilar opacity representing atelectasis or developing pneumonia  Left basilar subsegmental atelectasis  Workstation performed: [id number]         FL barium swallow video w speech   Final Result      Large residuals in the valleculae and piriform sinuses  No evidence of laryngeal penetration or aspiration  Please see the Speech Therapist's report for a full description of findings and recommendations  Workstation performed: [id number]         XR chest portable ICU   Final Result   Slight worsening of bilateral alveolar infiltrates  Workstation performed: [id number]         XR chest portable   Final Result      Improved aeration of the left lung with some residual left lower lobe infiltrate or atelectasis  There is no pneumothorax           Workstation performed: [id number]         XR chest portable   Final Result      Completely opacified left lung likely related to left lung atelectasis, perhaps related to mucous plugging  ##imslh##imslh         Workstation performed: [id number]         XR chest portable ICU   Final Result   1  Evolving left lower lobe infiltrate representing atelectasis or pneumonia  2   Low-lying endotracheal tube with tip projecting towards the left mainstem bronchus  Repositioning is recommended  3   Malpositioned orogastric tube with tip projecting into the EG junction  Repositioning recommended  ##sigslh##sigslh         Workstation performed: [id number]         XR chest portable   Final Result      ET tube tip above the hong  Workstation performed: [id number]         XR chest portable   Final Result      No active pulmonary disease  Workstation performed: [id number]         XR skull < 4 vw   Final Result      No radiopaque foreign body  Workstation performed: [id number]         MRI brain w wo contrast   Final Result   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      No focal brain parenchymal abnormalities      No acute intracranial hemorrhage or mass effect      No pathologic enhancement               Workstation performed: [id number]         XR chest 1 view portable   Final Result   Diminished inspiration  No active pulmonary disease  Workstation performed: [id number]             Invasive Devices          No matching active lines, drains, or airways                Assessment:  Acute respiratory failure with hypoxia resolved  Aspiration pneumonia sputum showing Moraxella catarrhalis resolved  Feeding problem secondary to aspiration the Guardian does not want PEG tube she would like patient be discharged to [facility] he may be able to eat in the familiar  surroundings    Atelectasis  Urinary tract infection culture revealing Enterococcus faecalis  Frequent falls with head injury  Postconcussion syndrome  Severe intellectual disability  Chronic dysphagia concern for recurrent aspiration  Lundberg catheter is out patient has not voided urine yet urology is following the patient  Urinary retention  Hypokalemia potassium of 3 3  Hypernatremia sodium of  148  Plan:  Continue with  IV fluids  Aspiration precautions  NPO except meds  IV fluids  Left a message for the [facility] director to call me with regards to the patient's status and discharge planning  Discussed with Lilia James guardian for the patient at present time she does not want PEG tube to be placed    She prefers patient be tried with diet at the [facility]

## 2017-11-26 LAB
ANION GAP SERPL CALCULATED.3IONS-SCNC: 8 MMOL/L (ref 4–13)
BUN SERPL-MCNC: 9 MG/DL (ref 5–25)
CALCIUM SERPL-MCNC: 8.5 MG/DL (ref 8.3–10.1)
CHLORIDE SERPL-SCNC: 109 MMOL/L (ref 100–108)
CO2 SERPL-SCNC: 25 MMOL/L (ref 21–32)
CREAT SERPL-MCNC: 0.15 MG/DL (ref 0.6–1.3)
GFR SERPL CREATININE-BSD FRML MDRD: 196 ML/MIN/1.73SQ M
GLUCOSE SERPL-MCNC: 82 MG/DL (ref 65–140)
GLUCOSE SERPL-MCNC: 85 MG/DL (ref 65–140)
POTASSIUM SERPL-SCNC: 3.9 MMOL/L (ref 3.5–5.3)
SODIUM SERPL-SCNC: 142 MMOL/L (ref 136–145)

## 2017-11-26 PROCEDURE — 94640 AIRWAY INHALATION TREATMENT: CPT

## 2017-11-26 PROCEDURE — 82948 REAGENT STRIP/BLOOD GLUCOSE: CPT

## 2017-11-26 PROCEDURE — 80048 BASIC METABOLIC PNL TOTAL CA: CPT | Performed by: INTERNAL MEDICINE

## 2017-11-26 PROCEDURE — 94760 N-INVAS EAR/PLS OXIMETRY 1: CPT

## 2017-11-26 RX ADMIN — ENOXAPARIN SODIUM 40 MG: 40 INJECTION SUBCUTANEOUS at 08:56

## 2017-11-26 RX ADMIN — IPRATROPIUM BROMIDE AND ALBUTEROL SULFATE 3 ML: .5; 3 SOLUTION RESPIRATORY (INHALATION) at 13:16

## 2017-11-26 RX ADMIN — DEXTROSE, SODIUM CHLORIDE, AND POTASSIUM CHLORIDE 75 ML/HR: 5; .2; .15 INJECTION INTRAVENOUS at 03:57

## 2017-11-26 RX ADMIN — CHLORHEXIDINE GLUCONATE 15 ML: 1.2 RINSE ORAL at 22:03

## 2017-11-26 RX ADMIN — ATORVASTATIN CALCIUM 20 MG: 20 TABLET, FILM COATED ORAL at 22:03

## 2017-11-26 RX ADMIN — QUETIAPINE FUMARATE 100 MG: 100 TABLET ORAL at 22:03

## 2017-11-26 RX ADMIN — DIVALPROEX SODIUM 750 MG: 250 TABLET, DELAYED RELEASE ORAL at 08:57

## 2017-11-26 RX ADMIN — IPRATROPIUM BROMIDE AND ALBUTEROL SULFATE 3 ML: .5; 3 SOLUTION RESPIRATORY (INHALATION) at 20:48

## 2017-11-26 RX ADMIN — IPRATROPIUM BROMIDE AND ALBUTEROL SULFATE 3 ML: .5; 3 SOLUTION RESPIRATORY (INHALATION) at 07:27

## 2017-11-26 RX ADMIN — Medication 1 TABLET: at 22:03

## 2017-11-26 RX ADMIN — ASPIRIN 81 MG 81 MG: 81 TABLET ORAL at 08:57

## 2017-11-26 RX ADMIN — AMITRIPTYLINE HYDROCHLORIDE 25 MG: 10 TABLET, FILM COATED ORAL at 22:03

## 2017-11-26 RX ADMIN — CHLORHEXIDINE GLUCONATE 15 ML: 1.2 RINSE ORAL at 08:57

## 2017-11-26 RX ADMIN — ESCITALOPRAM OXALATE 20 MG: 10 TABLET ORAL at 08:56

## 2017-11-26 RX ADMIN — DIVALPROEX SODIUM 250 MG: 250 TABLET, DELAYED RELEASE ORAL at 22:03

## 2017-11-26 RX ADMIN — ARIPIPRAZOLE 30 MG: 10 TABLET ORAL at 08:56

## 2017-11-26 NOTE — RESPIRATORY THERAPY NOTE
Called by RN patient desatting and sounds wet  I orally suctioned patient with a 14 [place] catheter  I suctioned a moderate amount of what looked like old applesauce  RN had not given him anything yet today  Placed patient on NC Spo2 96% on 4L

## 2017-11-26 NOTE — PROGRESS NOTES
Patient examined spoke with the nurse and the staff was watching him  Reportedly patient is the same gets agitated rest last combative and kicking at times and he needs medications to control his behavior  Patient is suffering from dementia mental retardation psychosis and depression patient is unpredictable and unreliable he is not able to communicate his feelings no meaningful communications  Patient response inappropriately to a simple command by raising his arm  Patient is a total care  Nurse reports that he needs his medications to control his behavior sometime he becomes unmanageable and unpredictable patient needs to be on one-to-one observation for the safety  Medical treatment is in progress  Patient has trouble in swallowing and fake tube insertion recommended  I reviewed his psychotropic medications  Patient is not lethargic no side effects of medications noted  Patient is not suicidal   I will continue all his psychotropic medications as ordered and follow up  Thank you very much

## 2017-11-26 NOTE — PROGRESS NOTES
Progress Note - Germain Snell 61 y o  male MRN: [id number]    Unit/Bed#: [address] [address] Encounter: [id number]        Subjective:   Patient had episodes of coughing on and off while giving medications and sometimes has to be suctioned deep  He is still NPO he has failed speech therapy evaluation  I discussed with the medical director at [facility] the cannot take the patient back if he is aspirating and NPO  They want me to speak to the guardian again [address] THEY CAN ACCEPT THE PATIENT  The covering guardian for Emmanuel Terrazas are not able to make any decisions  Review of Systems    Objective:     Vitals: Blood pressure 102/61, pulse 99, temperature (!) 97 3 °F (36 3 °C), temperature source Tympanic, resp  rate 16, height 5' 3" (1 6 m), weight 48 4 kg (106 lb 11 2 oz), SpO2 96 %  ,Body mass index is 18 9 kg/m²      No intake or output data in the 24 hours ending 11/26/17 1637      Current Facility-Administered Medications:     amitriptyline (ELAVIL) tablet 25 mg, 25 mg, Oral, HS, Nikki Patricia MD, 25 mg at 11/25/17 2129    ARIPiprazole (ABILIFY) tablet 30 mg, 30 mg, Oral, Daily, Johnathon Balderas MD, 30 mg at 11/26/17 0856    aspirin chewable tablet 81 mg, 81 mg, Oral, Daily, Luisa Olivera PA-C, 81 mg at 11/26/17 0857    atorvastatin (LIPITOR) tablet 20 mg, 20 mg, Oral, HS, Johnathon Balderas MD, 20 mg at 11/25/17 2132    chlorhexidine (PERIDEX) 0 12 % oral rinse 15 mL, 15 mL, Swish & Spit, Q12H [facility], Luisa Olivera PA-C, 15 mL at 11/26/17 0857    dextrose 5 % and sodium chloride 0 2 % with KCl 20 mEq/L infusion, 75 mL/hr, Intravenous, Continuous, Johnathon Balderas MD, Last Rate: 75 mL/hr at 11/26/17 0357, 75 mL/hr at 11/26/17 0357    divalproex sodium (DEPAKOTE) EC tablet 250 mg, 250 mg, Oral, HS, Johnathon Balderas MD, 250 mg at 11/25/17 2134    divalproex sodium (DEPAKOTE) EC tablet 750 mg, 750 mg, Oral, QAMJohnathon MD, 750 mg at 11/26/17 0    divalproex sodium (DEPAKOTE) EC tablet 750 mg, 750 mg, Oral, After Lunch, Johnathon Balderas MD, 750 mg at 11/25/17 1452    enoxaparin (LOVENOX) subcutaneous injection 40 mg, 40 mg, Subcutaneous, Q24H [address], Jacqueline Paul MD, 40 mg at 11/26/17 0856    escitalopram (LEXAPRO) tablet 20 mg, 20 mg, Oral, Daily, Johnathon Balderas MD, 20 mg at 11/26/17 0856    ipratropium-albuterol (DUO-NEB) 0 5-2 5 mg/mL inhalation solution 3 mL, 3 mL, Nebulization, Q6H, Tiffany Sebastian PA-C, 3 mL at 11/26/17 1316    LORazepam (ATIVAN) 2 mg/mL injection 1 mg, 1 mg, Intravenous, Q4H PRN, Johnathon Balderas MD, 1 mg at 11/24/17 0909    polyethylene glycol (MIRALAX) packet 17 g, 17 g, Oral, Daily, Jacqueline Paul MD, 17 g at 11/20/17 7841    QUEtiapine (SEROquel) tablet 100 mg, 100 mg, Oral, HS, Johnathon Balderas MD, 100 mg at 11/25/17 2132    senna-docusate sodium (SENOKOT S) 8 6-50 mg per tablet 1 tablet, 1 tablet, Oral, HS, Jacqueline Paul MD, 1 tablet at 11/25/17 2133     Physical Exam   Constitutional:   Confused and disoriented periods of agitation   HENT:   Patient has ecchymotic areas on the forehead and also on the back of the head   Eyes: Conjunctivae are normal  Pupils are equal, round, and reactive to light  Neck: Neck supple  No thyromegaly present  Cardiovascular: Normal rate and regular rhythm  Pulmonary/Chest: No respiratory distress  He exhibits no tenderness  Transmitted sounds present   Abdominal: Soft  Bowel sounds are normal    Musculoskeletal: Normal range of motion  He exhibits no edema or deformity  Neurological: He is alert  Periods of agitation   Skin: Skin is warm and dry             Lab, Imaging and culture:     Recent Results (from the past 72 hour(s))   CBC and differential    Collection Time: 11/25/17  5:24 AM   Result Value Ref Range    WBC 4 40 (L) 4 80 - 10 80 Thousand/uL    RBC 4 18 (L) 4 70 - 6 10 Million/uL    Hemoglobin 12 0 (L) 14 0 - 18 0 g/dL    Hematocrit 35 7 (L) 42 0 - 52 0 %    MCV 85 82 - 98 fL    MCH 28 7 27 0 - 31 0 pg    MCHC 33 6 31 4 - 37 4 g/dL    RDW 14 9 11 6 - 15 1 %    MPV 7 3 (L) 8 9 - 12 7 fL    Platelets 564 137 - 014 Thousands/uL    Neutrophils Relative 72 43 - 75 %    Lymphocytes Relative 20 14 - 44 %    Monocytes Relative 7 4 - 12 %    Eosinophils Relative 1 0 - 6 %    Basophils Relative 0 0 - 1 %    Neutrophils Absolute 3 10 1 85 - 7 62 Thousands/µL    Lymphocytes Absolute 0 90 0 60 - 4 47 Thousands/µL    Monocytes Absolute 0 30 0 17 - 1 22 Thousand/µL    Eosinophils Absolute 0 10 0 00 - 0 61 Thousand/µL    Basophils Absolute 0 00 0 00 - 0 10 Thousands/µL   Comprehensive metabolic panel    Collection Time: 11/25/17  5:24 AM   Result Value Ref Range    Sodium 148 (H) 136 - 145 mmol/L    Potassium 3 3 (L) 3 5 - 5 3 mmol/L    Chloride 108 100 - 108 mmol/L    CO2 31 21 - 32 mmol/L    Anion Gap 9 4 - 13 mmol/L    BUN 7 5 - 25 mg/dL    Creatinine 0 17 (L) 0 60 - 1 30 mg/dL    Glucose 67 65 - 140 mg/dL    Calcium 8 4 8 3 - 10 1 mg/dL    AST 51 (H) 5 - 45 U/L    ALT 48 12 - 78 U/L    Alkaline Phosphatase 118 (H) 46 - 116 U/L    Total Protein 6 1 (L) 6 4 - 8 2 g/dL    Albumin 2 4 (L) 3 5 - 5 0 g/dL    Total Bilirubin 0 40 0 20 - 1 00 mg/dL    eGFR 186 ml/min/1 73sq m   Basic metabolic panel    Collection Time: 11/26/17  7:10 AM   Result Value Ref Range    Sodium 142 136 - 145 mmol/L    Potassium 3 9 3 5 - 5 3 mmol/L    Chloride 109 (H) 100 - 108 mmol/L    CO2 25 21 - 32 mmol/L    Anion Gap 8 4 - 13 mmol/L    BUN 9 5 - 25 mg/dL    Creatinine 0 15 (L) 0 60 - 1 30 mg/dL    Glucose 85 65 - 140 mg/dL    Calcium 8 5 8 3 - 10 1 mg/dL    eGFR 196 ml/min/1 73sq m       XR chest portable   Final Result      Limited study  Mild right basilar opacity representing atelectasis or developing pneumonia  Left basilar subsegmental atelectasis           Workstation performed: [id number]         FL barium swallow video w speech   Final Result      Large residuals in the valleculae and piriform sinuses  No evidence of laryngeal penetration or aspiration  Please see the Speech Therapist's report for a full description of findings and recommendations  Workstation performed: [id number]         XR chest portable ICU   Final Result   Slight worsening of bilateral alveolar infiltrates  Workstation performed: [id number]         XR chest portable   Final Result      Improved aeration of the left lung with some residual left lower lobe infiltrate or atelectasis  There is no pneumothorax  Workstation performed: [id number]         XR chest portable   Final Result      Completely opacified left lung likely related to left lung atelectasis, perhaps related to mucous plugging  ##imslh##imslh         Workstation performed: [id number]         XR chest portable ICU   Final Result   1  Evolving left lower lobe infiltrate representing atelectasis or pneumonia  2   Low-lying endotracheal tube with tip projecting towards the left mainstem bronchus  Repositioning is recommended  3   Malpositioned orogastric tube with tip projecting into the EG junction  Repositioning recommended  ##sigslh##sigslh         Workstation performed: [id number]         XR chest portable   Final Result      ET tube tip above the hong  Workstation performed: [id number]         XR chest portable   Final Result      No active pulmonary disease  Workstation performed: [id number]         XR skull < 4 vw   Final Result      No radiopaque foreign body                  Workstation performed: [id number]         MRI brain w wo contrast   Final Result   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      No focal brain parenchymal abnormalities      No acute intracranial hemorrhage or mass effect      No pathologic enhancement               Workstation performed: [id number]         XR chest 1 view portable   Final Result   Diminished inspiration  No active pulmonary disease  Workstation performed: [id number]             Invasive Devices     Peripheral Intravenous Line            Peripheral IV 11/25/17 Left;Ventral (anterior) Forearm 1 day                      Assessment:  Acute respiratory failure with hypoxia resolved  Aspiration pneumonia sputum showing Moraxella catarrhalis resolved  Feeding problem secondary to aspiration the Guardian does not want PEG tube she would like patient be discharged to [facility] he may be able to eat in the familiar  surroundings  Atelectasis  Urinary tract infection culture revealing Enterococcus faecalis  Frequent falls with head injury  Postconcussion syndrome  Severe intellectual disability  Chronic dysphagia concern for recurrent aspiration  Lundberg catheter is out patient has not voided urine yet urology is following the patient  Urinary retention  Hypokalemia resolved potassium today is 3 9   Hypernatremia resolved sodium is 142  Plan:  Continue with  IV fluids  Aspiration precautions  NPO except meds  I spoke to the medical director at [facility] as mentioned above  I will be speaking to the guardian Rudy Villalta again tomorrow regarding making the patient comfort care and then sending him back to [facility]

## 2017-11-27 PROCEDURE — 94760 N-INVAS EAR/PLS OXIMETRY 1: CPT

## 2017-11-27 PROCEDURE — 94640 AIRWAY INHALATION TREATMENT: CPT

## 2017-11-27 RX ORDER — DIVALPROEX SODIUM 125 MG/1
250 CAPSULE, COATED PELLETS ORAL
Status: DISCONTINUED | OUTPATIENT
Start: 2017-11-27 | End: 2017-11-28 | Stop reason: HOSPADM

## 2017-11-27 RX ADMIN — IPRATROPIUM BROMIDE AND ALBUTEROL SULFATE 3 ML: .5; 3 SOLUTION RESPIRATORY (INHALATION) at 13:55

## 2017-11-27 RX ADMIN — ESCITALOPRAM OXALATE 20 MG: 10 TABLET ORAL at 10:26

## 2017-11-27 RX ADMIN — QUETIAPINE FUMARATE 100 MG: 100 TABLET ORAL at 22:49

## 2017-11-27 RX ADMIN — AMITRIPTYLINE HYDROCHLORIDE 25 MG: 10 TABLET, FILM COATED ORAL at 22:49

## 2017-11-27 RX ADMIN — LORAZEPAM 1 MG: 2 INJECTION INTRAMUSCULAR; INTRAVENOUS at 20:53

## 2017-11-27 RX ADMIN — DIVALPROEX SODIUM 750 MG: 250 TABLET, DELAYED RELEASE ORAL at 10:27

## 2017-11-27 RX ADMIN — IPRATROPIUM BROMIDE AND ALBUTEROL SULFATE 3 ML: .5; 3 SOLUTION RESPIRATORY (INHALATION) at 08:42

## 2017-11-27 RX ADMIN — ATORVASTATIN CALCIUM 20 MG: 20 TABLET, FILM COATED ORAL at 22:49

## 2017-11-27 RX ADMIN — Medication 1 TABLET: at 22:49

## 2017-11-27 RX ADMIN — ENOXAPARIN SODIUM 40 MG: 40 INJECTION SUBCUTANEOUS at 10:27

## 2017-11-27 RX ADMIN — DEXTROSE, SODIUM CHLORIDE, AND POTASSIUM CHLORIDE 75 ML/HR: 5; .2; .15 INJECTION INTRAVENOUS at 12:37

## 2017-11-27 RX ADMIN — CHLORHEXIDINE GLUCONATE 15 ML: 1.2 RINSE ORAL at 22:49

## 2017-11-27 RX ADMIN — ASPIRIN 81 MG 81 MG: 81 TABLET ORAL at 10:27

## 2017-11-27 RX ADMIN — IPRATROPIUM BROMIDE AND ALBUTEROL SULFATE 3 ML: .5; 3 SOLUTION RESPIRATORY (INHALATION) at 01:42

## 2017-11-27 RX ADMIN — DIVALPROEX SODIUM 250 MG: 125 CAPSULE, COATED PELLETS ORAL at 22:48

## 2017-11-27 RX ADMIN — IPRATROPIUM BROMIDE AND ALBUTEROL SULFATE 3 ML: .5; 3 SOLUTION RESPIRATORY (INHALATION) at 20:28

## 2017-11-27 RX ADMIN — ARIPIPRAZOLE 30 MG: 10 TABLET ORAL at 10:28

## 2017-11-27 NOTE — PROGRESS NOTES
Progress Note - Urology   Jae Beauchamp 61 y o  male MRN: [id number]  Unit/Bed#: [address] [place] [address] Encounter: [id number]    Assessment/Plan:  Urinary retention  No change in renal function while in urinary retention  Retention possibly longstanding  Cystoscopy 11/21/2017 indicated a short appearing prostate without any significant obstruction  Unable to keep his Lundberg secondary to his cognitive disabilities and trauma risk  · Avoid Lundberg catheter and allow patient to be in overflow incontinence  · Will follow as needed    Subjective:  Seen on rounds and notes the following:  Patient continues lying bed and rolling around  Does not appear to be holding his suprapubic or abdominal region to indicate pain  Possible PEG tube or just comfort care in the future  Objective:  Vitals Last 24 hours:   Temp:  [97 3 °F (36 3 °C)-97 8 °F (36 6 °C)] 97 8 °F (36 6 °C)  HR:  [71-99] 71  Resp:  [16-18] 18  BP: ()/(60-75) 100/60  BMI: Body mass index is 18 94 kg/m²  Physical Exam   Pulmonary/Chest: Effort normal    Abdominal: Soft  He exhibits distension (Some suprapubic distention noted)  Skin: Skin is warm         Drains:   None    Lab Results and Cultures:     Results from last 7 days  Lab Units 11/26/17  0710 11/25/17  0524 11/22/17  0628   WBC Thousand/uL  --  4 40* 4 20*   RBC Million/uL  --  4 18* 4 10*   HEMOGLOBIN g/dL  --  12 0* 11 7*   HEMATOCRIT %  --  35 7* 35 2*   PLATELETS Thousands/uL  --  145 150   POTASSIUM mmol/L 3 9 3 3* 3 6   CHLORIDE mmol/L 109* 108 106   CO2 mmol/L 25 31 30   BUN mg/dL 9 7 7   CREATININE mg/dL 0 15* 0 17* <0 15*   GLUCOSE RANDOM mg/dL 85 67 80   CALCIUM mg/dL 8 5 8 4 8 3   AST U/L  --  51*  --    ALT U/L  --  48  --    ALK PHOS U/L  --  118*  --    ALBUMIN g/dL  --  2 4*  --    BILIRUBIN TOTAL mg/dL  --  0 40  --    EGFR ml/min/1 73sq m 196 186  --                    Lab Results   Component Value Date    URINECX 10,000-19,000 cfu/ml Enterococcus faecalis (A) 11/13/2017       No intake or output data in the 24 hours ending 11/27/17 3216    Imaging:  none    Obie Ya PA-C  11/27/2017    Portions of the record may have been created with voice recognition software   Occasional wrong word or "sound a like" substitutions may have occurred due to the inherent limitations of voice recognition software   Read the chart carefully and recognize, using context, where substitutions have occurred

## 2017-11-27 NOTE — PLAN OF CARE
Problem: RESPIRATORY - ADULT  Goal: Achieves optimal ventilation and oxygenation  INTERVENTIONS:  - Assess for changes in respiratory status  - Assess for changes in mentation and behavior  - Position to facilitate oxygenation and minimize respiratory effort  - Oxygen administration by appropriate delivery method based on oxygen saturation (per order) or ABG  - Assess the need for suctioning and aspirate as needed  - Respiratory Therapy support as indicated    Outcome: Adequate for Discharge  Requested dc of resp  Services from md as patient no longer appears to need resp   Intervention will update on 11/30 if not dc/d

## 2017-11-27 NOTE — PROGRESS NOTES
Patient examined spoke with the nurse and the staff who is watching him on one-to-one observation  Patient is in a wheelchair and he is able to roll wheelchair around by himself raise his hand in response to a simple verbal command  Currently patient is not agitated  Patient is on NPO due to the swallowing problem I read the medical note from Dr Mathew Herman he is waiting a decision from the guardian regarding place the PEG tube or put him on a comfort care  Patient gets agitated at times he needs medications no side effects of medications noted nurse reported that he remained the same patient remained unpredictable periods of agitation patient is suffering from mental retardation dementia psychosis and depression patient has been on bunch of psychotropic medications for the behavior problem  Unable to reduce any of psych medications  I will continue all the psychotropic medications as ordered  Patient remain on one-to-one observation for the safety

## 2017-11-27 NOTE — PROGRESS NOTES
Spoke with Dr Kurtis Ahn, staff from [facility] come to assist Lovenjoel dinner  Pureed diet with pudding thick liquids was ordered

## 2017-11-27 NOTE — SOCIAL WORK
ALEXEI following to monitor needs and assist as needed  Pt is a resident of [place]  Currently there seems to be an issue complicating pt's return to the center  There seems to be a discrepancy whether pt can swallow safely or not  The developmental center staff is concerned pt cannot so they are insisting on a peg tube or comfort care  SW contacted pt's guardian, Gasper Coon, to discuss situation  Ms Lesa Brown said she feels strongly the pt will be fine with eating and swallowing as soon as he returns "home"  Per guardian pt does not like to even leave the cottage so it doesn't surprise her that he is having some issues  Guardian will not consider a feeding tube at this time  And she in no way feels comfort care is appropriate  Ms Lesa Brown is in the process of talking with the [facility] medical staff and Dr Lizzeth Nash about situation  SW offered to fax clinical to the center if it would be helpful  Ms Lesa Brown requested the results of the modified barium swallow and MAR be sent to the clinic  Information faxed as requested  Offered ongoing assistance if needed  SW will continue to follow to monitor progress and assist as needed

## 2017-11-27 NOTE — CASE MANAGEMENT
Continued Stay Review    Date: 11/26    Vital Signs:  11/26/17 0732  97 6 °F (36 4 °C)  91  18  118/67  98 %  None (Room air)  Lying     Medications:   Scheduled Meds:   amitriptyline 25 mg Oral HS   ARIPiprazole 30 mg Oral Daily   aspirin 81 mg Oral Daily   atorvastatin 20 mg Oral HS   chlorhexidine 15 mL Swish & Spit Q12H [address]   divalproex sodium 250 mg Oral HS   divalproex sodium 750 mg Oral QAM   divalproex sodium 750 mg Oral After Lunch   enoxaparin 40 mg Subcutaneous Q24H SHERICE   escitalopram 20 mg Oral Daily   ipratropium-albuterol 3 mL Nebulization Q6H   polyethylene glycol 17 g Oral Daily   QUEtiapine 100 mg Oral HS   senna-docusate sodium 1 tablet Oral HS     Continuous Infusions:   dextrose 5 % and sodium chloride 0 2 % with KCl 20 mEq/L 75 mL/hr Last Rate: 75 mL/hr (11/26/17 0357)     PRN Meds: LORazepam    Abnormal Labs/Diagnostic Results:   Creat = 0 15    Age/Sex: 61 y o  male     Assessment:  Acute respiratory failure with hypoxia resolved  Aspiration pneumonia sputum showing Moraxella catarrhalis resolved  Feeding problem secondary to aspiration the Guardian does not want PEG tube she would like patient be discharged to [facility] he may be able to eat in the familiar  surroundings  Atelectasis  Urinary tract infection culture revealing Enterococcus faecalis  Frequent falls with head injury  Postconcussion syndrome  Severe intellectual disability  Chronic dysphagia concern for recurrent aspiration  Lundberg catheter is out patient has not voided urine yet urology is following the patient    Urinary retention  Hypokalemia potassium of 3 3  Hypernatremia sodium of  148    Plan:  Continue with  IV fluids  Aspiration precautions  NPO except meds  IV fluids  Left a message for the [facility] director to call me with regards to the patient's status and discharge planning  Discussed with Nelson Villanueva guardian for the patient at present time she does not want PEG tube to be placed    She prefers patient be tried with diet at the [facility]    Discharge Plan: pending

## 2017-11-28 VITALS
DIASTOLIC BLOOD PRESSURE: 80 MMHG | WEIGHT: 107.14 LBS | RESPIRATION RATE: 16 BRPM | SYSTOLIC BLOOD PRESSURE: 162 MMHG | BODY MASS INDEX: 18.98 KG/M2 | OXYGEN SATURATION: 94 % | HEIGHT: 63 IN | TEMPERATURE: 99 F | HEART RATE: 80 BPM

## 2017-11-28 PROCEDURE — 94760 N-INVAS EAR/PLS OXIMETRY 1: CPT

## 2017-11-28 PROCEDURE — 92526 ORAL FUNCTION THERAPY: CPT

## 2017-11-28 PROCEDURE — 94640 AIRWAY INHALATION TREATMENT: CPT

## 2017-11-28 PROCEDURE — 97110 THERAPEUTIC EXERCISES: CPT

## 2017-11-28 RX ORDER — POLYETHYLENE GLYCOL 3350 17 G/17G
17 POWDER, FOR SOLUTION ORAL DAILY
Qty: 14 EACH | Refills: 0
Start: 2017-11-29

## 2017-11-28 RX ORDER — DIVALPROEX SODIUM 125 MG/1
750 CAPSULE, COATED PELLETS ORAL DAILY
Status: DISCONTINUED | OUTPATIENT
Start: 2017-11-28 | End: 2017-11-28 | Stop reason: HOSPADM

## 2017-11-28 RX ORDER — DIVALPROEX SODIUM 125 MG/1
750 CAPSULE, COATED PELLETS ORAL DAILY
Refills: 0
Start: 2017-11-29 | End: 2018-06-18

## 2017-11-28 RX ORDER — QUETIAPINE FUMARATE 100 MG/1
100 TABLET, FILM COATED ORAL
Refills: 0
Start: 2017-11-28 | End: 2018-05-10

## 2017-11-28 RX ORDER — DIVALPROEX SODIUM 125 MG/1
750 CAPSULE, COATED PELLETS ORAL
Status: DISCONTINUED | OUTPATIENT
Start: 2017-11-28 | End: 2017-11-28 | Stop reason: HOSPADM

## 2017-11-28 RX ORDER — DIVALPROEX SODIUM 125 MG/1
750 CAPSULE, COATED PELLETS ORAL
Refills: 0
Start: 2017-11-28 | End: 2018-06-18

## 2017-11-28 RX ORDER — AMITRIPTYLINE HYDROCHLORIDE 25 MG/1
25 TABLET, FILM COATED ORAL
Refills: 0
Start: 2017-11-28 | End: 2018-05-10

## 2017-11-28 RX ORDER — DIVALPROEX SODIUM 125 MG/1
250 CAPSULE, COATED PELLETS ORAL
Refills: 0
Start: 2017-11-28 | End: 2018-06-18

## 2017-11-28 RX ADMIN — POLYETHYLENE GLYCOL 3350 17 G: 17 POWDER, FOR SOLUTION ORAL at 09:21

## 2017-11-28 RX ADMIN — ESCITALOPRAM OXALATE 20 MG: 10 TABLET ORAL at 09:19

## 2017-11-28 RX ADMIN — ASPIRIN 81 MG 81 MG: 81 TABLET ORAL at 09:19

## 2017-11-28 RX ADMIN — DIVALPROEX SODIUM 750 MG: 125 CAPSULE, COATED PELLETS ORAL at 09:20

## 2017-11-28 RX ADMIN — CHLORHEXIDINE GLUCONATE 15 ML: 1.2 RINSE ORAL at 09:21

## 2017-11-28 RX ADMIN — DEXTROSE, SODIUM CHLORIDE, AND POTASSIUM CHLORIDE 75 ML/HR: 5; .2; .15 INJECTION INTRAVENOUS at 15:47

## 2017-11-28 RX ADMIN — DEXTROSE, SODIUM CHLORIDE, AND POTASSIUM CHLORIDE 75 ML/HR: 5; .2; .15 INJECTION INTRAVENOUS at 02:14

## 2017-11-28 RX ADMIN — ENOXAPARIN SODIUM 40 MG: 40 INJECTION SUBCUTANEOUS at 09:20

## 2017-11-28 RX ADMIN — ARIPIPRAZOLE 30 MG: 10 TABLET ORAL at 09:21

## 2017-11-28 RX ADMIN — IPRATROPIUM BROMIDE AND ALBUTEROL SULFATE 3 ML: .5; 3 SOLUTION RESPIRATORY (INHALATION) at 08:09

## 2017-11-28 RX ADMIN — LORAZEPAM 1 MG: 2 INJECTION INTRAMUSCULAR; INTRAVENOUS at 16:48

## 2017-11-28 RX ADMIN — IPRATROPIUM BROMIDE AND ALBUTEROL SULFATE 3 ML: .5; 3 SOLUTION RESPIRATORY (INHALATION) at 02:55

## 2017-11-28 RX ADMIN — IPRATROPIUM BROMIDE AND ALBUTEROL SULFATE 3 ML: .5; 3 SOLUTION RESPIRATORY (INHALATION) at 13:41

## 2017-11-28 NOTE — PROGRESS NOTES
Patient examined spoke with the nurse patient looks better today patient remained on one-to-one observation for the safety staff reports that he ate today and he took a walk he seems better with the agitation and seemed to be getting back to his baseline  Patient also takes his medications  Patient response to a simple verbal command by waving his hand and he is not in distress he is not lethargic  Nurse reports that he is the same  Medical progress note from yesterday reviewed and noted patient ate his supper and it seems that he is eating better  Medical evaluation and treatment is in progress  Patient id not lethargic not agitated at this time patient is not suicidal patient needs help with ADL care  I will continue all his psychotropic medications the same as ordered and follow up  Thank you very much

## 2017-11-28 NOTE — SPEECH THERAPY NOTE
SLP  Swallow Therapy Note    S:  Patient alert, OOB in chair  No pain evident  Attendant from [facility] present to feed patient lunch  O:  Dysphagia 1/ Puree diet and pudding thick liquids received  SLP was requested by MD to educate and instruct caregiver from [facility] on feeding/ swallow techniques and strategies to prevent potential aspiration  Patient's oral pharyngeal swallow skills reassessed with lunch, as fed by his caregiver from [facility]  She brought in a small spoon patient normally uses to feed himself  Instructed her to feed patient slowly, small bites, and wait for patient to swallow prior to presenting the next bolus  He displayed timely/ rapid oral and pharyngeal phases with puree and pudding thick liquids  Swallow initiated quickly with some reduced laryngeal excursion noted  As PO intake progressed, overt s/s of aspiration including immediate post swallow coughing and wet gargling respirations audible with increased work of breathing by the end of meal   This has been a consistent pattern/ observation made during the patient's clinical course with PO intake of same diet consistency  Additionally, this has resulted in the need for frequent and urgent suctioning which removed food substances, including warranting transfer to ICU due to respiratory difficulty and subsequent intubation  A:  Patient displays overt s/s of aspiration by end of meal suspected secondary to build up of pharyngeal residuals with difficulty clearing/ mobilizing  High risk for aspiration remains present  P:  Continue to recommend NPO status due to high risk for aspiration of all POs  Consider comfort care/ palliative care measures in light of the patient's Guardian's decision to not have PEG tube placed for nutritional support long term    If patient placed on a PO diet, a most conservative diet consistency of puree and pudding thick liquids is most appropriate with instructed safe swallow strategies, including small frequent meals      D/w patient's caregiver from [facility], [address] [facility], and Dr Tima Mckeon

## 2017-11-28 NOTE — PHYSICAL THERAPY NOTE
PT TREATMENT     11/28/17 1420   Pain Assessment   Pain Assessment Montejo-Baker FACES   Montejo-Baker FACES Pain Rating 0   Restrictions/Precautions   Other Precautions Chair Alarm; Bed Alarm;Cognitive; Fall Risk;1:1   General   Chart Reviewed Yes   Family/Caregiver Present No   Cognition   Arousal/Participation Cooperative   Subjective   Subjective patient basically nonverbal    Bed Mobility   Supine to Sit 2  Maximal assistance   Additional items Assist x 1   Sit to Supine 2  Maximal assistance   Additional items Assist x 1   Transfers   Sit to Stand Unable to assess  (patient resistant agitated at times)   Exercises   Heelslides Sitting;10 reps;AAROM; Bilateral   Hip Flexion Sitting;10 reps;AAROM; Bilateral   Hip Abduction Sitting;10 reps;AAROM; Bilateral   Knee AROM Long Arc Quad Sitting;10 reps;Bilateral   Assessment   Assessment patient cooperative but with limited command follow  Patient OOB in recliner chair with 1:1 and will benefit from continued PT with progression as tolerated   Plan   Treatment/Interventions ADL retraining;Functional transfer training;LE strengthening/ROM; Therapeutic exercise; Endurance training;Patient/family training;Bed mobility;Gait training;Equipment eval/education   PT Frequency (3-5x/week)   Recommendation   Recommendation (return to [facility]/with PT)

## 2017-11-28 NOTE — RESPIRATORY THERAPY NOTE
Pt has secretions and saliva in throat airway which move with cough  Breath sounds had some slight ronchi  Order obtained by Dr Lizzeth Nash to n/t suction  Pt tolerated fairly well still combative  No distress noted post suctioning

## 2017-11-28 NOTE — [place] UNIVERSAL TRANSFER FORM
[place] UNIV[place]AL [place]SFER FOR[place]  (ALL ITEMS MUST BE COMPLETED)    1  [facility]      [facility]    2  DATE OF TRANSFER: 11/28/2017                        TIME OF TRANSFER: 1700  3  PATIENT NAME: MADELINE Germain      YOB: 1960                             GENDER: male    4  LANGUAGE:   English    5  PHYSICIAN NAME:  Di Montano MD                   PHONE: [phone]    6  CODE STATUS: Level 1 - Full Code        Out of Hospital DNR Attached: No    7  :                                      :  Extended Emergency Contact Information  Primary Emergency Contact: Leandra Posadas  Address: [address] [place] [address]  Work Phone: [phone]  Mobile Phone: [phone]  Relation: Other  Secondary Emergency Contact: Angela Romero   [place]  Relation: Mother           Health Care Representative/Proxy:  No           Legal Guardian:  No             NAME OF:           HEALTH CARE REPRESENTATIVE/PROXY:                                         OR           LEGAL GUARDIAN, IF NOT :                                               PHONE:  (Day)           (Night)                        (Cell)    8  REASON FOR TRANSFER: (Must include brief medical history and recent changes in physical function or cognition ) Pt residence            V/S: /80   Pulse 80   Temp 99 °F (37 2 °C) (Tympanic)   Resp 16   Ht 5' 3" (1 6 m)   Wt 48 6 kg (107 lb 2 3 oz)   SpO2 94%   BMI 18 98 kg/m²           PAIN: None    9  PRIMARY DIAGNOSIS: Acute cystitis without hematuria      Secondary Diagnosis:         Pacemaker: No      Internal Defib: No          Mental Health Diagnosis (if Applicable):    10  RESTRAINTS: No     11  RESPIRATORY NEEDS: None    12  ISOLATION/PRECAUTION: None    13  ALLERGY: Haldol [haloperidol]; Navane [thiothixene]; and Thorazine [chlorpromazine]    14   SENSORY:       Speech Aphasia    15  SKIN CONDITION: No Wounds    16  DIET: Special (describe)puree, pudding thick liquids    17  IV ACCESS: None    18  PERSONAL ITEMS SENT WITH PATIENT: Darrell    23  ATTACHED DOCUMENTS: MUST ATTACH CURRENT MEDICATION INFORMATION Face Sheet, MAR, TAR, POS, Diagnostic Studies, Labs, Operative Report, Code Status, Discharge Summary, PT Note, OT Note, ST Note and HX/PE    20  AT RISK ALERTS:Falls and Aspiration        HARM TO: Self    21  WEIGHT BEARING STATUS:         Left Leg: Limited        Right Leg: Limited    22  MENTAL STATUS:Forgetful and Otherconfused, uncooperative    23  FUNCTION:        Walk: With Help        Transfer: With Help        Toilet: Not Able        Feed: With Help    24  IMMUNIZATIONS/SCREENING:     There is no immunization history on file for this patient  25  BOWEL: Incontinent     26  BLADDER: Incontinent    27   SENDING FACILITY CONTACT: Cristela Flores                   Title: RN        Unit: [address] [place]        Phone: [phone]          [address] (if known):        Title:        Unit:         Phone:         FORM PREFILLED BY (if applicable)       Title:       Unit:        Phone:         Cinthya East RN      Title: RN      Phone: [phone]

## 2017-11-28 NOTE — PROGRESS NOTES
Progress Note - Yousif Angelo 61 y o  male MRN: [id number]    Unit/Bed#: 2 [place] [address] Encounter: [id number]        Subjective:   Chart reviewed patient evaluated discussed with the guardian x2 and also with the medical director of [facility]  Patient ate his supper in the presence of Staff from [facility] along with our nursing  He did have some cough symptoms but overall did well  Review of Systems    Objective:     Vitals: Blood pressure 140/66, pulse 84, temperature (!) 97 4 °F (36 3 °C), temperature source Tympanic, resp  rate 18, height 5' 3" (1 6 m), weight 48 5 kg (106 lb 14 8 oz), SpO2 97 %  ,Body mass index is 18 94 kg/m²        Intake/Output Summary (Last 24 hours) at 11/27/17 2009  Last data filed at 11/27/17 1901   Gross per 24 hour   Intake              120 ml   Output                0 ml   Net              120 ml         Current Facility-Administered Medications:     amitriptyline (ELAVIL) tablet 25 mg, 25 mg, Oral, HS, German Richard MD, 25 mg at 11/26/17 2203    ARIPiprazole (ABILIFY) tablet 30 mg, 30 mg, Oral, Daily, Johnathon Balderas MD, 30 mg at 11/27/17 1028    aspirin chewable tablet 81 mg, 81 mg, Oral, Daily, Luisa Olivera PA-C, 81 mg at 11/27/17 1027    atorvastatin (LIPITOR) tablet 20 mg, 20 mg, Oral, HS, Johnathon Balderas MD, 20 mg at 11/26/17 2203    chlorhexidine (PERIDEX) 0 12 % oral rinse 15 mL, 15 mL, Swish & Spit, Q12H [address], Luisa Olivera PA-C, 15 mL at 11/26/17 2203    dextrose 5 % and sodium chloride 0 2 % with KCl 20 mEq/L infusion, 75 mL/hr, Intravenous, Continuous, Johnathon Balderas MD, Last Rate: 75 mL/hr at 11/27/17 1237, 75 mL/hr at 11/27/17 1237    divalproex sodium (DEPAKOTE) EC tablet 250 mg, 250 mg, Oral, HS, Johnathon Balderas MD, 250 mg at 11/26/17 2203    divalproex sodium (DEPAKOTE) EC tablet 750 mg, 750 mg, Oral, Johnathon HURD MD, 750 mg at 11/27/17 1027    divalproex sodium (DEPAKOTE) EC tablet 750 mg, 750 mg, Oral, After Lunch, Johnathon Balderas MD, 750 mg at 11/25/17 1452    enoxaparin (LOVENOX) subcutaneous injection 40 mg, 40 mg, Subcutaneous, Q24H [address], Pavan Fuller MD, 40 mg at 11/27/17 1027    escitalopram (LEXAPRO) tablet 20 mg, 20 mg, Oral, Daily, Johnathon Balderas MD, 20 mg at 11/27/17 1026    ipratropium-albuterol (DUO-NEB) 0 5-2 5 mg/mL inhalation solution 3 mL, 3 mL, Nebulization, Q6H, Willian Dempsey PA-C, 3 mL at 11/27/17 1355    LORazepam (ATIVAN) 2 mg/mL injection 1 mg, 1 mg, Intravenous, Q4H PRN, Johnathon Balderas MD, 1 mg at 11/24/17 0909    polyethylene glycol (MIRALAX) packet 17 g, 17 g, Oral, Daily, Pavan Fuller MD, 17 g at 11/20/17 0220    QUEtiapine (SEROquel) tablet 100 mg, 100 mg, Oral, HS, Johnathon Balderas MD, 100 mg at 11/26/17 2203    senna-docusate sodium (SENOKOT S) 8 6-50 mg per tablet 1 tablet, 1 tablet, Oral, HS, Pavan Fuller MD, 1 tablet at 11/26/17 2203     Physical Exam   Constitutional:   Confused and disoriented periods of agitation   HENT:   Patient has ecchymotic areas on the forehead and also on the back of the head   Eyes: Conjunctivae are normal  Pupils are equal, round, and reactive to light  Neck: Neck supple  No thyromegaly present  Cardiovascular: Normal rate and regular rhythm  Pulmonary/Chest: No respiratory distress  He exhibits no tenderness  Transmitted sounds present   Abdominal: Soft  Bowel sounds are normal    Musculoskeletal: Normal range of motion  He exhibits no edema or deformity  Neurological: He is alert  Periods of agitation   Skin: Skin is warm and dry             Lab, Imaging and culture:     Recent Results (from the past 72 hour(s))   CBC and differential    Collection Time: 11/25/17  5:24 AM   Result Value Ref Range    WBC 4 40 (L) 4 80 - 10 80 Thousand/uL    RBC 4 18 (L) 4 70 - 6 10 Million/uL    Hemoglobin 12 0 (L) 14 0 - 18 0 g/dL    Hematocrit 35 7 (L) 42 0 - 52 0 %    MCV 85 82 - 98 fL    MCH 28 7 27 0 - 31 0 pg    MCHC 33 6 31 4 - 37 4 g/dL    RDW 14 9 11 6 - 15 1 %    MPV 7 3 (L) 8 9 - 12 7 fL    Platelets 614 343 - 425 Thousands/uL    Neutrophils Relative 72 43 - 75 %    Lymphocytes Relative 20 14 - 44 %    Monocytes Relative 7 4 - 12 %    Eosinophils Relative 1 0 - 6 %    Basophils Relative 0 0 - 1 %    Neutrophils Absolute 3 10 1 85 - 7 62 Thousands/µL    Lymphocytes Absolute 0 90 0 60 - 4 47 Thousands/µL    Monocytes Absolute 0 30 0 17 - 1 22 Thousand/µL    Eosinophils Absolute 0 10 0 00 - 0 61 Thousand/µL    Basophils Absolute 0 00 0 00 - 0 10 Thousands/µL   Comprehensive metabolic panel    Collection Time: 11/25/17  5:24 AM   Result Value Ref Range    Sodium 148 (H) 136 - 145 mmol/L    Potassium 3 3 (L) 3 5 - 5 3 mmol/L    Chloride 108 100 - 108 mmol/L    CO2 31 21 - 32 mmol/L    Anion Gap 9 4 - 13 mmol/L    BUN 7 5 - 25 mg/dL    Creatinine 0 17 (L) 0 60 - 1 30 mg/dL    Glucose 67 65 - 140 mg/dL    Calcium 8 4 8 3 - 10 1 mg/dL    AST 51 (H) 5 - 45 U/L    ALT 48 12 - 78 U/L    Alkaline Phosphatase 118 (H) 46 - 116 U/L    Total Protein 6 1 (L) 6 4 - 8 2 g/dL    Albumin 2 4 (L) 3 5 - 5 0 g/dL    Total Bilirubin 0 40 0 20 - 1 00 mg/dL    eGFR 186 ml/min/1 73sq m   Basic metabolic panel    Collection Time: 11/26/17  7:10 AM   Result Value Ref Range    Sodium 142 136 - 145 mmol/L    Potassium 3 9 3 5 - 5 3 mmol/L    Chloride 109 (H) 100 - 108 mmol/L    CO2 25 21 - 32 mmol/L    Anion Gap 8 4 - 13 mmol/L    BUN 9 5 - 25 mg/dL    Creatinine 0 15 (L) 0 60 - 1 30 mg/dL    Glucose 85 65 - 140 mg/dL    Calcium 8 5 8 3 - 10 1 mg/dL    eGFR 196 ml/min/1 73sq m   Fingerstick Glucose (POCT)    Collection Time: 11/26/17  8:40 PM   Result Value Ref Range    POC Glucose 82 65 - 140 mg/dl       XR chest portable   Final Result      Limited study  Mild right basilar opacity representing atelectasis or developing pneumonia  Left basilar subsegmental atelectasis           Workstation performed: [id number]         FL barium swallow video w speech Final Result      Large residuals in the valleculae and piriform sinuses  No evidence of laryngeal penetration or aspiration  Please see the Speech Therapist's report for a full description of findings and recommendations  Workstation performed: [id number]         XR chest portable ICU   Final Result   Slight worsening of bilateral alveolar infiltrates  Workstation performed: [id number]         XR chest portable   Final Result      Improved aeration of the left lung with some residual left lower lobe infiltrate or atelectasis  There is no pneumothorax  Workstation performed: [id number]         XR chest portable   Final Result      Completely opacified left lung likely related to left lung atelectasis, perhaps related to mucous plugging  ##[email or web address]##imslh         Workstation performed: [id number]         XR chest portable ICU   Final Result   1  Evolving left lower lobe infiltrate representing atelectasis or pneumonia  2   Low-lying endotracheal tube with tip projecting towards the left mainstem bronchus  Repositioning is recommended  3   Malpositioned orogastric tube with tip projecting into the EG junction  Repositioning recommended  ##[email or web address]##sigslh         Workstation performed: [id number]         XR chest portable   Final Result      ET tube tip above the hong  Workstation performed: [id number]         XR chest portable   Final Result      No active pulmonary disease  Workstation performed: [id number]         XR skull < 4 vw   Final Result      No radiopaque foreign body                  Workstation performed: [id number]         MRI brain w wo contrast   Final Result   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      No focal brain parenchymal abnormalities      No acute intracranial hemorrhage or mass effect      No pathologic enhancement               Workstation performed: [id number]         XR chest 1 view portable   Final Result   Diminished inspiration  No active pulmonary disease  Workstation performed: [id number]             Invasive Devices     Peripheral Intravenous Line            Peripheral IV 11/25/17 Left;Ventral (anterior) Forearm 2 days                      Assessment:  Acute respiratory failure with hypoxia resolved  Aspiration pneumonia sputum showing Moraxella catarrhalis resolved  Feeding problem did well with supper  Atelectasis  Urinary tract infection culture revealing Enterococcus faecalis  Frequent falls with head injury  Postconcussion syndrome  Severe intellectual disability  Chronic dysphagia concern for recurrent aspiration  Lundberg catheter is out patient has not voided urine yet urology is following the patient  Urinary retention  Hypokalemia resolved potassium today is 3 9   Hypernatremia resolved sodium is 142    Plan:  Continue with  IV fluids  Aspiration precautions  Diet pureed with pudding thick liquids  Will have the patient seen by the speech therapist as well as by the staff of the [facility] at the same time while he is eating his breakfast so that we can coordinate his discharge plan

## 2017-11-28 NOTE — NURSING NOTE
Pt left the unit in his own wheelchair accompanied by the transport team of [facility]  Patient is comfortable, medicated with ativan prior to d/c to decrease agitation  IV removed, no SOB, no discomfort  Mitts to BL hands and lap belt also removed, patient dressed and assisted to wheelchair by staff and transport team  AVS paperwork given to transport team and report called in to [facility], nurse staff, Sarah Shepherd

## 2017-11-28 NOTE — SOCIAL WORK
Discharge ordered  Pt will be transferred back to [place]  The developmental center will be arranging transportation  Inter-Facility Transfer After Visit Summary was faxed to clinic  Unit, [place] and S guardian Cordelia Narayanan) aware of plan

## 2017-12-05 NOTE — DISCHARGE SUMMARY
Discharge Summary - Roberth Feng 61 y o  male MRN: [id number]    Unit/Bed#: [address] [place] [address] Encounter: [id number]    Admission Date: 11/8/2017     Admitting Diagnosis: Head injury [S09 90XA]  Frequent falls [R29 6]    HPI:  Patient is a 67-year-old white male resident of [facility] has been admitted through the emergency room because of frequent falls resulting in head injury  Patient was seen in she was seen in the [facility] CT scan done with negative and subsequently he was discharged back to [facility]  Patient was Center for  further evaluation and subsequently got admitted for further treatment  Procedures Performed:   Orders Placed This Encounter   Procedures    Bronchoscopy   [facility] Course:  Patient was admitted to general medical floor his sodium level was IV was started on IV fluids subsequently was also seen by the nephrologist who adjusted the fluids with D5W  It was felt high sodium was secondary to poor intake  He was also seen by the neurologist MRI was done which has not shown any acute findings  However patient has been very combative with restless and has to be given p r n  medications to calm him down he was on one-to-one observation also  Patient was also having problem with swallowing aspiration seen by the speech therapist also he was kept NPO  However patient was having increased secretions and had to be suctioned frequently  However on 12th November patient developed acute respiratory distress he had increased secretions pulse oximetry had shown low oxygenation in the 70s and patient was getting on response to a rapid response team was called and subsequently patient got intubated for acute hypoxic respiratory failure  Patient was started on IV antibiotics Unasyn  IV fluids were continued    Patient has self-extubated started becoming again more restless and subsequently patient has been transferred back to the general medical floor  Still there was a concern for aspiration he was seen by the speech therapist a modified barium swallow was done it did not show any penetration however whenever patient was given any food or medication it was noted he was developing increasing congestion and was coughing  Speech therapist all the recommended NPO  I discussed with the guardian for this patient regarding PEG tube placement but she did not want it  [facility] where the patient is a resident did not want to take him back under [facility] patient is only on comfort measures  Had several discussions with the guardian and also with the physicians from [facility] and it was decided a need will come from [facility] to help with feeding in the presence of the speech therapist   Patient did well with eating on pureed diet with honey thick liquids  Once his condition got stabilized he has been discharged back to the [facility]  Patient also developed urinary retention Lundberg catheter has tube placed in [facility] discontinued seen by the urologist  And patient had a cystoscopy which has revealed after cystitis without immaturity a  Urology strongly recommended not to place the Lundberg catheter back  Patient subsequently was incontinent of urine was able to pass urine  Once the patient condition got stabilized he has will be discharged back to [facility] on the medications mentioned in the after visit summary  Complications:  None    Labs:  No results found for this or any previous visit (from the past 72 hour(s))  XR chest portable   Final Result      Limited study  Mild right basilar opacity representing atelectasis or developing pneumonia  Left basilar subsegmental atelectasis  Workstation performed: [id number]         FL barium swallow video w speech   Final Result      Large residuals in the valleculae and piriform sinuses    No evidence of laryngeal penetration or aspiration  Please see the Speech Therapist's report for a full description of findings and recommendations  Workstation performed: [id number]         XR chest portable ICU   Final Result   Slight worsening of bilateral alveolar infiltrates  Workstation performed: [id number]         XR chest portable   Final Result      Improved aeration of the left lung with some residual left lower lobe infiltrate or atelectasis  There is no pneumothorax  Workstation performed: [id number]         XR chest portable   Final Result      Completely opacified left lung likely related to left lung atelectasis, perhaps related to mucous plugging  ##imslh##imslh         Workstation performed: [id number]         XR chest portable ICU   Final Result   1  Evolving left lower lobe infiltrate representing atelectasis or pneumonia  2   Low-lying endotracheal tube with tip projecting towards the left mainstem bronchus  Repositioning is recommended  3   Malpositioned orogastric tube with tip projecting into the EG junction  Repositioning recommended  ##sigslh##sigslh         Workstation performed: [id number]         XR chest portable   Final Result      ET tube tip above the hong  Workstation performed: [id number]         XR chest portable   Final Result      No active pulmonary disease  Workstation performed: [id number]         XR skull < 4 vw   Final Result      No radiopaque foreign body  Workstation performed: [id number]         MRI brain w wo contrast   Final Result   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      No focal brain parenchymal abnormalities      No acute intracranial hemorrhage or mass effect      No pathologic enhancement               Workstation performed: [id number]         XR chest 1 view portable   Final Result   Diminished inspiration  No active pulmonary disease  Workstation performed: [id number]           Invasive Devices          No matching active lines, drains, or airways          Discharge Diagnosis:   Frequent falls with head injury   Postconcussion syndrome  Acute respiratory failure with hypoxia resolved  Aspiration pneumonia sputum showing Moraxella catarrhalis resolved  Urinary tract infection cultures revealing Enterococcus faecalis resolved  Chronic dysphagia concerns for recurrent aspiration  Urinary retention resolved  Profound intellectual disability  History of hyperlipidemia  Plan:  Patient will be discharged on the medications he was taking before  Patient will be on pureed diet with honey thick liquids  Aspiration precautions  Fall precautions    Condition at Discharge: fair     Discharge instructions/Information to patient and family:   See after visit summary for information provided to patient and family  Provisions for Follow-Up Care:  See after visit summary for information related to follow-up care and any pertinent home health orders  Disposition: Extended care facility [facility]        Discharge Statement   I spent 30 minutes discharging the patient  This time was spent on the day of discharge  I had direct contact with the patient on the day of discharge  Additional documentation is required if more than 30 minutes were spent on discharge  Discharge Medications:  See after visit summary for reconciled discharge medications provided to patient and family

## 2017-12-18 LAB — FUNGUS SPEC CULT: NORMAL

## 2018-01-03 LAB
MYCOBACTERIUM SPEC CULT: NORMAL
RHODAMINE-AURAMINE STN SPEC: NORMAL

## 2018-01-11 NOTE — PROCEDURES
Procedures by Isreal Rico MD at 11/14/2017   1:14 PM      Author:  Isreal Rico MD Service:  Pulmonology Author Type:  Physician    Filed:  11/15/2017 12:19 PM Date of Service:  11/14/2017  1:14 PM Status:  Addendum    :  Isreal Rico MD (Physician)      Related Notes:  Original Note by Isreal Rico MD (Physician) filed at 11/15/2017 12:18 PM        Procedure Orders:       1  Bronchoscopy [[id number]] ordered by Isreal Rico MD at 11/15/17 1214                 Post-procedure Diagnoses:       1  Mucus plugging of bronchi [J98 09]                 Bronchoscopy  Date/Time: 11/15/2017 1:14 PM  Performed by: Foreign Beckett by: Mark Chisholm     Patient location:  Bedside  Other Assisting Provider:  No    Consent:     Consent obtained:  Verbal    Consent given by:  Rehana Contreras protocol:     Procedure explained and questions answered to patient or proxy's satisfaction: yes      Immediately prior to procedure a time out was called: yes    Indications:     Procedure Purpose: therapeutic      Indications: pneumonia/infiltrate    Sedation:     Sedation type:  Per anesthesia  Upper Airway:     Oropharnyx: normal      Epiglottis: normal      Vocal cords movement: normal      Trachea: normal      Nona:  Normal  Airway:     Airway:  Minimal thick secretions suctioned bilaterally  Procedure details:     Description:  Bronchoscope introduced via oropharynx  Patient had little to no gag reflex  Lidocaine used to anesthetize the vocal cords  Bronchoscope was then passed through the vocal cords into the trachea without difficulty  Both right and  left bronchial trees were examined and suctioned  There was minimal thick secretions suctioned bilaterally  No endobronchial lesions  No erythema  Procedures performed:     Lavage site:  Left lower lobe  Post-procedure details:     Chest x-ray performed: yes      Chest x-ray findings: Improved aeration  Patient tolerance of procedure:   Tolerated well, no immediate complications    Incomplete procedure: No    Final Diagnosis/Findings:      Mucous plugging resulting in left lung collapse                         Received Aníbal CERVANTES    Nov 15 2017 12:19PM [facility] Standard Time

## 2018-01-12 NOTE — PROCEDURES
Procedures by ETHAN Sosa at 2017  4:06 PM      Author:  ETHAN Sosa Service:  (none) Author Type:  Speech and Language Pathologist    Filed:  2017  4:13 PM Date of Service:  2017  4:06 PM Status:  Signed    :  ETHAN Sosa (Speech and Language Pathologist)                                            Video Fluoroscopic Swallow Study      Patient Name: Yulissa Martinez  Today's Date: 2017    Past Medical History  Past Medical History:     Diagnosis  Date    Deviated nasal septum     Eczema     Gastritis     Hyperlipidemia     Impulse control disorder     Insomnia     Mental retardation     Osteoarthritis     Psychiatric disorder     atypical psychosis, impulse control disorder     Ptosis of both eyelids     r > l     Scoliosis      Past Surgical History  Past Surgical History:      Procedure  Laterality Date    [place] [address] INCL FLUOR GDNCE DX W/CELL WASHG SPX N/A 2017    Procedure: BRONCHOSCOPY FLEXIBLE;  Surgeon: Pricila Garcia MD;  Location: [facility] GI LAB; Service: Pulmonary       General Information:  Ordering Physician: Dr Neris Paz  Radiologist: Dr Amie Valdez  Date of Order: 17  Date of Evaluation: 17  Type of Study: Initial MBS study  Diet Prior to this Study: Puree diet and Pudding Thick liquids  Past Medical History: MR, frequent falls  Additional History: Patient with delayed coughing following all POs/ meals  Positionin degrees in the lateral plane  Materials Administered: Puree, Honey thick liquid    Oral Stage:  Puree: Reduced posterior propulsion  Honey Thick Liquid: Reduced posterior propulsion  Oral Stage Performance:  Moderately impaired     Pharyngeal Stage:  Puree: Delayed swallow initiation, Pharyngeal residue - valleculae and pyriforms  Honey Thick Liquid: Delayed swallow initiation, Pharyngeal residue - valleculae and pyriforms  Aspiration Risk: No laryngeal penetration or aspiration seen during or following all swallows  Delayed coughing was observed following intakes  Suspect secondary to large pharryngeal residuals  Swallow Initiation was: Mildly delayed  Hyolaryngeal Excursion was: Adequate  Epiglottic Inversion was: Present  Tongue Drive was: Mild to moderately weak  Pharyngeal Constriction was: Moderately weak  Cricopharyngeal Opening/ Relaxation was: Adequate  Cervical Osteophytes: Noted, with no impact on transit of food through the Pharynx     Esophageal Stage:  Within functional limits at the cervical level, no back flow or stasis evident  Impressions:  Oral pharyngeal swallow skills are functional for puree and pudding thick liquids      Diagnosis/ Prognosis:  Moderate oral pharyngeal dysphagia  Prognosis for Safe Diet Advancement: Poor  Barriers to Reach Goals: Cognitive deficits, Severity of dysphagia     Recommendations:  Diet Texture: Dysphagia 1/ Puree  Liquid Consistency: Pudding Thick Liquid  Liquid Administration: Teaspoon  Medication Administration: Medication crushed in a puree bolus  Suggested Positioning: Upright/ 90 degrees  Suggested Precautions: Aspiration precautions, Feed when alert fully upright position, Cue for small bites/ sips and slow rate, Remain upright 45 degrees after meals, Reflux precautions  Strategies: Small bites/ sips  Dysphagia Treatment Recommended: No  Consider Follow-up VBS: PRN                           Received for:Provider  EPIC   Nov 17 2017  4:13PM [facility] Standard Time

## 2018-01-13 NOTE — PROCEDURES
Procedures by Bev Lechuga PA-C at 11/12/2017  6:31 PM      Author:  Bev Lechuga PA-C Service:  Critical Care/ICU Author Type:  Physician Assistant    Filed:  11/12/2017  6:33 PM Date of Service:  11/12/2017  6:31 PM Status:  Attested    :  Bev Lechuga PA-C (Physician Assistant)  Cosigner:  Daniel Zepeda MD at 11/13/2017 10:41 AM      Procedure Orders:       1  INTUBATION [[id number]] ordered by Bev Lechuga PA-C at 11/12/17 1831                 Post-procedure Diagnoses:       1  Acute respiratory failure with hypoxia ([facility] [address] ) [J96 01]              Attestation signed by Daniel Zepeda MD at 11/13/2017 10:41 AM      Intubation was performed with MAC 3, grade 2b view  Intubation  Date/Time: 11/12/2017 6:31 PM  Performed by: David Handler by: Ethan Ayala     Patient location:  Bedside  Consent:     Consent obtained:  Emergent situation  Universal protocol:     Patient identity confirmed:  Hospital-assigned identification number  Pre-procedure details:     Patient status:  Altered mental status    Pretreatment medications:  Etomidate    Paralytics:  None  Indications:     Indications for intubation: respiratory distress, respiratory failure, airway protection and hypoxemia    Placement assessment:     ETT to lip:  23 cm    Tube secured with:  ETT carrera    Breath sounds:  Equal    Placement verification: chest rise, condensation, CXR verification, direct visualization, equal breath sounds, ETCO2 detector and tube exhalation      CXR findings:  ETT in proper place  Post-procedure details:     Patient tolerance of procedure:   Tolerated well, no immediate complications                     Received for:Provider  EPIC   Nov 13 2017 10:41AM [facility] Standard [facility]

## 2018-04-05 ENCOUNTER — HOSPITAL ENCOUNTER (EMERGENCY)
Facility: HOSPITAL | Age: 58
Discharge: HOME | End: 2018-04-06
Attending: EMERGENCY MEDICINE | Admitting: EMERGENCY MEDICINE
Payer: MEDICARE

## 2018-04-05 ENCOUNTER — APPOINTMENT (EMERGENCY)
Dept: RADIOLOGY | Facility: HOSPITAL | Age: 58
End: 2018-04-05
Payer: MEDICARE

## 2018-04-05 DIAGNOSIS — R31.9 HEMATURIA: Primary | ICD-10-CM

## 2018-04-05 LAB
ALBUMIN SERPL BCP-MCNC: 2.8 G/DL (ref 3.5–5)
ALP SERPL-CCNC: 106 U/L (ref 46–116)
ALT SERPL W P-5'-P-CCNC: 34 U/L (ref 12–78)
ANION GAP SERPL CALCULATED.3IONS-SCNC: 4 MMOL/L (ref 4–13)
APTT PPP: 27 SECONDS (ref 24–33)
AST SERPL W P-5'-P-CCNC: 20 U/L (ref 5–45)
BACTERIA UR QL AUTO: ABNORMAL /HPF
BASOPHILS # BLD AUTO: 0 THOUSANDS/ΜL (ref 0–0.1)
BASOPHILS NFR BLD AUTO: 0 % (ref 0–1)
BILIRUB SERPL-MCNC: 0.3 MG/DL (ref 0.2–1)
BILIRUB UR QL STRIP: NEGATIVE
BUN SERPL-MCNC: 12 MG/DL (ref 5–25)
CALCIUM SERPL-MCNC: 9.4 MG/DL (ref 8.3–10.1)
CHLORIDE SERPL-SCNC: 98 MMOL/L (ref 100–108)
CLARITY UR: ABNORMAL
CO2 SERPL-SCNC: 34 MMOL/L (ref 21–32)
COLOR UR: ABNORMAL
CREAT SERPL-MCNC: 0.22 MG/DL (ref 0.6–1.3)
EOSINOPHIL # BLD AUTO: 0 THOUSAND/ΜL (ref 0–0.61)
EOSINOPHIL NFR BLD AUTO: 1 % (ref 0–6)
ERYTHROCYTE [DISTWIDTH] IN BLOOD BY AUTOMATED COUNT: 16.6 % (ref 11.6–15.1)
GFR SERPL CREATININE-BSD FRML MDRD: 167 ML/MIN/1.73SQ M
GLUCOSE SERPL-MCNC: 72 MG/DL (ref 65–140)
GLUCOSE UR STRIP-MCNC: NEGATIVE MG/DL
HCT VFR BLD AUTO: 41.3 % (ref 42–52)
HGB BLD-MCNC: 13.6 G/DL (ref 14–18)
HGB UR QL STRIP.AUTO: ABNORMAL
INR PPP: 1.12 (ref 0.86–1.16)
KETONES UR STRIP-MCNC: NEGATIVE MG/DL
LEUKOCYTE ESTERASE UR QL STRIP: ABNORMAL
LYMPHOCYTES # BLD AUTO: 0.8 THOUSANDS/ΜL (ref 0.6–4.47)
LYMPHOCYTES NFR BLD AUTO: 9 % (ref 14–44)
MCH RBC QN AUTO: 27.5 PG (ref 27–31)
MCHC RBC AUTO-ENTMCNC: 32.8 G/DL (ref 31.4–37.4)
MCV RBC AUTO: 84 FL (ref 82–98)
MONOCYTES # BLD AUTO: 0.5 THOUSAND/ΜL (ref 0.17–1.22)
MONOCYTES NFR BLD AUTO: 6 % (ref 4–12)
NEUTROPHILS # BLD AUTO: 7.3 THOUSANDS/ΜL (ref 1.85–7.62)
NEUTS SEG NFR BLD AUTO: 85 % (ref 43–75)
NITRITE UR QL STRIP: NEGATIVE
NON-SQ EPI CELLS URNS QL MICRO: ABNORMAL /HPF
NRBC BLD AUTO-RTO: 0 /100 WBCS
PH UR STRIP.AUTO: 8 [PH] (ref 5–9)
PLATELET # BLD AUTO: 121 THOUSANDS/UL (ref 130–400)
PMV BLD AUTO: 8.4 FL (ref 8.9–12.7)
POTASSIUM SERPL-SCNC: 3.8 MMOL/L (ref 3.5–5.3)
PROT SERPL-MCNC: 7.3 G/DL (ref 6.4–8.2)
PROT UR STRIP-MCNC: ABNORMAL MG/DL
PROTHROMBIN TIME: 11.8 SECONDS (ref 9.4–11.7)
RBC # BLD AUTO: 4.93 MILLION/UL (ref 4.7–6.1)
RBC #/AREA URNS AUTO: ABNORMAL /HPF
SODIUM SERPL-SCNC: 136 MMOL/L (ref 136–145)
SP GR UR STRIP.AUTO: 1.01 (ref 1–1.03)
UROBILINOGEN UR QL STRIP.AUTO: 0.2 E.U./DL
WBC # BLD AUTO: 8.6 THOUSAND/UL (ref 4.8–10.8)
WBC #/AREA URNS AUTO: ABNORMAL /HPF

## 2018-04-05 PROCEDURE — 85610 PROTHROMBIN TIME: CPT | Performed by: EMERGENCY MEDICINE

## 2018-04-05 PROCEDURE — 80053 COMPREHEN METABOLIC PANEL: CPT | Performed by: EMERGENCY MEDICINE

## 2018-04-05 PROCEDURE — 96374 THER/PROPH/DIAG INJ IV PUSH: CPT

## 2018-04-05 PROCEDURE — 85025 COMPLETE CBC W/AUTO DIFF WBC: CPT | Performed by: EMERGENCY MEDICINE

## 2018-04-05 PROCEDURE — 96361 HYDRATE IV INFUSION ADD-ON: CPT

## 2018-04-05 PROCEDURE — 81001 URINALYSIS AUTO W/SCOPE: CPT | Performed by: EMERGENCY MEDICINE

## 2018-04-05 PROCEDURE — 85730 THROMBOPLASTIN TIME PARTIAL: CPT | Performed by: EMERGENCY MEDICINE

## 2018-04-05 PROCEDURE — 87086 URINE CULTURE/COLONY COUNT: CPT | Performed by: EMERGENCY MEDICINE

## 2018-04-05 PROCEDURE — 74176 CT ABD & PELVIS W/O CONTRAST: CPT

## 2018-04-05 PROCEDURE — 36415 COLL VENOUS BLD VENIPUNCTURE: CPT | Performed by: EMERGENCY MEDICINE

## 2018-04-05 RX ADMIN — CEFTRIAXONE 1000 MG: 1 INJECTION, SOLUTION INTRAVENOUS at 21:07

## 2018-04-05 RX ADMIN — SODIUM CHLORIDE 1000 ML: 0.9 INJECTION, SOLUTION INTRAVENOUS at 19:48

## 2018-04-06 VITALS
TEMPERATURE: 97.5 F | RESPIRATION RATE: 16 BRPM | OXYGEN SATURATION: 99 % | WEIGHT: 107 LBS | HEART RATE: 73 BPM | SYSTOLIC BLOOD PRESSURE: 131 MMHG | DIASTOLIC BLOOD PRESSURE: 75 MMHG | BODY MASS INDEX: 18.95 KG/M2

## 2018-04-06 PROCEDURE — 99284 EMERGENCY DEPT VISIT MOD MDM: CPT

## 2018-04-06 NOTE — ED NOTES
Pt repositioned for comfort,lights dimmed,o2 at 6L connected to trach mask     Moreno Roberts RN  04/05/18 9862

## 2018-04-06 NOTE — ED NOTES
Care taker at bedside,pt continues acting appropriately per care taker  Pt awake responds to verbal stimulation     Vidya Haywood RN  04/06/18 0005

## 2018-04-06 NOTE — ED NOTES
Patient is resting comfortably  Care taker aware transfer back to facility pending [facility], [address] [facility]  04/05/18 8960

## 2018-04-06 NOTE — DISCHARGE INSTRUCTIONS
Hematuria   WHAT YOU NEED TO KNOW:   Hematuria is blood in your urine  Your urine may be bright red to dark brown  DISCHARGE INSTRUCTIONS:   Return to the emergency department if:   · You have blood in your urine after a new injury, such as a fall  · You are urinating very small amounts or not at all  · You feel like you cannot empty your bladder  · You have severe back or side pain that does not go away with treatment  Contact your healthcare provider if:   · You have a fever that gets worse or does not go away with treatment  · You cannot keep liquids or medicines down  · Your urine gets darker, even after you drink extra liquids  · You have questions or concerns about your condition, treatment, or care  Drink liquids as directed: You may need to drink extra liquids to help flush the blood from your body through your urine  Water is the best liquid to drink  Ask how much liquid to drink each day and which liquids are best for you  Follow up with your healthcare provider as directed:  Write down your questions so you remember to ask them during your visits  © 2017 [facility] Information is for End User's use only and may not be sold, redistributed or otherwise used for commercial purposes  All illustrations and images included in CareNotes® are the copyrighted property of A D A M [employer]  or [employer]  The above information is an  only  It is not intended as medical advice for individual conditions or treatments  Talk to your doctor, nurse or pharmacist before following any medical regimen to see if it is safe and effective for you

## 2018-04-06 NOTE — ED NOTES
Pt awake,no change in assessment,suctioned trach  Care taker at bedside       Rosie Alves RN  04/06/18 4273

## 2018-04-06 NOTE — ED PROVIDER NOTES
History  Chief Complaint   Patient presents with    Blood in Urine     Pt presents to the ed from [facility] for hematuria x3 days  pt is being treated fro UTI      Patient sent in from [facility] for evaluation of hematuria  Patient has had it for 3 days and was being treated with Cipro for UTI  History provided by:  Caregiver and medical records  History limited by:  Patient nonverbal  Blood in Urine         Prior to Admission Medications   Prescriptions Last Dose Informant Patient Reported? Taking?    ARIPiprazole (ABILIFY) 30 mg tablet   Yes No   Sig: Take 30 mg by mouth daily   Multiple Vitamins-Minerals (MULTIVITAMIN ADULT PO)   Yes No   Sig: Take 1 tablet by mouth   QUEtiapine (SEROquel) 100 mg tablet   No No   Sig: Take 1 tablet by mouth daily at bedtime   acetaminophen (TYLENOL) 325 mg tablet   Yes No   Sig: Take 650 mg by mouth every 6 (six) hours as needed for mild pain   amitriptyline (ELAVIL) 25 mg tablet   No No   Sig: Take 1 tablet by mouth daily at bedtime   aspirin (ECOTRIN LOW STRENGTH) 81 mg EC tablet   Yes No   Sig: Take 81 mg by mouth daily   atorvastatin (LIPITOR) 20 mg tablet   Yes No   Sig: Take 20 mg by mouth daily at bedtime   divalproex sodium (DEPAKOTE SPRINKLE) 125 MG capsule   No No   Sig: Take 2 capsules by mouth daily at bedtime   divalproex sodium (DEPAKOTE SPRINKLE) 125 MG capsule   No No   Sig: Take 6 capsules by mouth daily   divalproex sodium (DEPAKOTE SPRINKLE) 125 MG capsule   No No   Sig: Take 6 capsules by mouth daily after lunch   escitalopram (LEXAPRO) 20 mg tablet   Yes No   Sig: Take 20 mg by mouth daily   omeprazole (PriLOSEC) 20 mg delayed release capsule   Yes No   Sig: Take 20 mg by mouth daily in the early morning   polyethylene glycol (MIRALAX) 17 g packet   No No   Sig: Take 17 g by mouth daily      Facility-Administered Medications: None       Past Medical History:   Diagnosis Date    Deviated nasal septum     Eczema     Gastritis     Hyperlipidemia     Impulse control disorder     Insomnia     Mental retardation     Osteoarthritis     Psychiatric disorder     atypical psychosis, impulse control disorder    Ptosis of both eyelids     r > l    Scoliosis        Past Surgical History:   Procedure Laterality Date    [place] BRONCHOSCOPY,DIAGNOSTIC N/A 11/14/2017    Procedure: BRONCHOSCOPY FLEXIBLE;  Surgeon: Phill Anthony MD;  Location: [facility] GI LAB; Service: Pulmonary    [place] CYSTOURETHROSCOPY N/A 11/21/2017    Procedure: Stacey Sachi;  Surgeon: Aidee Campbell MD;  Location: [address] [place] [address];  Service: Urology       History reviewed  No pertinent family history  I have reviewed and agree with the history as documented  Social History   Substance Use Topics    Smoking status: Never Smoker    Smokeless tobacco: Never Used    Alcohol use No        Review of Systems   Unable to perform ROS: Patient nonverbal   Genitourinary: Positive for hematuria  Physical Exam  ED Triage Vitals [04/05/18 1817]   Temperature Pulse Respirations Blood Pressure SpO2   (!) 97 1 °F (36 2 °C) 95 18 165/77 94 %      Temp Source Heart Rate Source Patient Position - Orthostatic VS BP Location FiO2 (%)   Tympanic Monitor Lying Right arm --      Pain Score       --           Orthostatic Vital Signs  Vitals:    04/05/18 1817 04/05/18 2113 04/05/18 2308 04/06/18 0122   BP: 165/77 130/76 137/78 131/75   Pulse: 95 95 93 73   Patient Position - Orthostatic VS: Lying Lying Lying Lying       Physical Exam   Constitutional: No distress  HENT:   Mouth/Throat: Oropharynx is clear and moist    Eyes: Pupils are equal, round, and reactive to light  Neck: Normal range of motion  Cardiovascular: Normal rate, regular rhythm and intact distal pulses  Pulmonary/Chest: Effort normal and breath sounds normal  No respiratory distress  Abdominal: Soft  There is no tenderness  Musculoskeletal: Normal range of motion  Neurological: He is alert     Skin: Capillary refill takes less than 2 seconds  He is not diaphoretic  Nursing note and vitals reviewed  ED Medications  Medications   sodium chloride 0 9 % bolus 1,000 mL (0 mL Intravenous Stopped 4/5/18 2118)   cefTRIAXone (ROCEPHIN) IVPB (premix) 1,000 mg (0 mg Intravenous Stopped 4/5/18 2107)       Diagnostic Studies  Results Reviewed     Procedure Component Value Units Date/Time    Urine Microscopic [[id number]]  (Abnormal) Collected:  04/05/18 2059    Lab Status:  Final result Specimen:  Urine from Urine, Indwelling Lundberg Catheter Updated:  04/05/18 2124     RBC, UA Innumerable (A) /hpf      WBC, UA 2-4 (A) /hpf      Epithelial Cells None Seen /hpf      Bacteria, UA Occasional /hpf     UA w Reflex to Microscopic [[id number]]  (Abnormal) Collected:  04/05/18 2059    Lab Status:  Final result Specimen:  Urine from Urine, Indwelling Lundberg Catheter Updated:  04/05/18 2109     Color, UA Light Yellow     Clarity, UA Cloudy     Specific Gravity, UA 1 015     pH, UA 8 0     Leukocytes, UA Trace (A)     Nitrite, UA Negative     Protein,  (2+) (A) mg/dl      Glucose, UA Negative mg/dl      Ketones, UA Negative mg/dl      Urobilinogen, UA 0 2 E U /dl      Bilirubin, UA Negative     Blood, UA Large (A)    Urine culture [[id number]] Collected:  04/05/18 2059    Lab Status:   In process Specimen:  Urine from Urine, Indwelling Lundberg Catheter Updated:  04/05/18 2106    Comprehensive metabolic panel [[id number]]  (Abnormal) Collected:  04/05/18 2012    Lab Status:  Final result Specimen:  Blood from Arm, Left Updated:  04/05/18 2033     Sodium 136 mmol/L      Potassium 3 8 mmol/L      Chloride 98 (L) mmol/L      CO2 34 (H) mmol/L      Anion Gap 4 mmol/L      BUN 12 mg/dL      Creatinine 0 22 (L) mg/dL      Glucose 72 mg/dL      Calcium 9 4 mg/dL      AST 20 U/L      ALT 34 U/L      Alkaline Phosphatase 106 U/L      Total Protein 7 3 g/dL      Albumin 2 8 (L) g/dL      Total Bilirubin 0 30 mg/dL      eGFR 167 ml/min/1 73sq m     Narrative: National Kidney Disease Education Program recommendations are as follows:  GFR calculation is accurate only with a steady state creatinine  Chronic Kidney disease less than 60 ml/min/1 73 sq  meters  Kidney failure less than 15 ml/min/1 73 sq  meters  Protime-INR [[id number]]  (Abnormal) Collected:  04/05/18 1946    Lab Status:  Final result Specimen:  Blood from Vein Updated:  04/05/18 2009     Protime 11 8 (H) seconds      INR 1 12    APTT [[id number]]  (Normal) Collected:  04/05/18 1946    Lab Status:  Final result Specimen:  Blood from Vein Updated:  04/05/18 2009     PTT 27 seconds     Narrative: Therapeutic Heparin Range = 60-90 seconds    CBC and differential [[id number]]  (Abnormal) Collected:  04/05/18 1946    Lab Status:  Final result Specimen:  Blood from Vein Updated:  04/05/18 1955     WBC 8 60 Thousand/uL      RBC 4 93 Million/uL      Hemoglobin 13 6 (L) g/dL      Hematocrit 41 3 (L) %      MCV 84 fL      MCH 27 5 pg      MCHC 32 8 g/dL      RDW 16 6 (H) %      MPV 8 4 (L) fL      Platelets 115 (L) Thousands/uL      nRBC 0 /100 WBCs      Neutrophils Relative 85 (H) %      Lymphocytes Relative 9 (L) %      Monocytes Relative 6 %      Eosinophils Relative 1 %      Basophils Relative 0 %      Neutrophils Absolute 7 30 Thousands/µL      Lymphocytes Absolute 0 80 Thousands/µL      Monocytes Absolute 0 50 Thousand/µL      Eosinophils Absolute 0 00 Thousand/µL      Basophils Absolute 0 00 Thousands/µL                  CT renal stone study abdomen pelvis without contrast   Final Result by Ignacio Phillips MD (04/05 2049)      No urinary tract calculi  Left lower lobe atelectasis  Severe dextroscoliosis of the lumbar spine               Workstation performed: [id number]                    Procedures  Procedures       Phone Contacts  ED Phone Contact    ED Course  ED Course                                MDM  Number of Diagnoses or Management Options  Hematuria:   Diagnosis management comments: Pulse ox 99% on RA indicating adequate oxygenation    Patient has dill in place on arrival red urine with some small clots  The bag was changed by the nurse and a clot was flushed from the catheter  After which the urine became clear  Lab work was normal and CT scan was unremarkable  Patient was given IVF for hydration and a dose of Ceftriaxone  UCx sent  Continue with abx for now and follow up Cx results and with Urology  Amount and/or Complexity of Data Reviewed  Clinical lab tests: ordered and reviewed  Tests in the radiology section of CPT®: ordered and reviewed  Decide to obtain previous medical records or to obtain history from someone other than the patient: yes  Obtain history from someone other than the patient: yes  Review and summarize past medical records: yes    Patient Progress  Patient progress: stable    CritCare Time    Disposition  Final diagnoses:   Hematuria     Time reflects when diagnosis was documented in both MDM as applicable and the Disposition within this note     Time User Action Codes Description Comment    4/5/2018  9:01 PM Hemalatha, Amanda Rue Ettatawer [R31 9] Hematuria       ED Disposition     ED Disposition Condition Comment    Discharge  [address] discharge to home/self care      Condition at discharge: stable        Follow-up Information     Follow up With Specialties Details Why Contact Info    Fiona Hemphill MD Internal Medicine In 2 days  [facility] Lennie Jeong MD Urology In 3 days  [address] [place] [address]  [phone]          Discharge Medication List as of 4/5/2018  9:02 PM      CONTINUE these medications which have NOT CHANGED    Details   acetaminophen (TYLENOL) 325 mg tablet Take 650 mg by mouth every 6 (six) hours as needed for mild pain, Historical Med      amitriptyline (ELAVIL) 25 mg tablet Take 1 tablet by mouth daily at bedtime, Starting Tue 11/28/2017, No Print ARIPiprazole (ABILIFY) 30 mg tablet Take 30 mg by mouth daily, Historical Med      aspirin (ECOTRIN LOW STRENGTH) 81 mg EC tablet Take 81 mg by mouth daily, Historical Med      atorvastatin (LIPITOR) 20 mg tablet Take 20 mg by mouth daily at bedtime, Historical Med      !! divalproex sodium (DEPAKOTE SPRINKLE) 125 MG capsule Take 2 capsules by mouth daily at bedtime, Starting Tue 11/28/2017, No Print      !! divalproex sodium (DEPAKOTE SPRINKLE) 125 MG capsule Take 6 capsules by mouth daily, Starting Wed 11/29/2017, No Print      !! divalproex sodium (DEPAKOTE SPRINKLE) 125 MG capsule Take 6 capsules by mouth daily after lunch, Starting Tue 11/28/2017, No Print      escitalopram (LEXAPRO) 20 mg tablet Take 20 mg by mouth daily, Historical Med      Multiple Vitamins-Minerals (MULTIVITAMIN ADULT PO) Take 1 tablet by mouth, Historical Med      omeprazole (PriLOSEC) 20 mg delayed release capsule Take 20 mg by mouth daily in the early morning, Historical Med      polyethylene glycol (MIRALAX) 17 g packet Take 17 g by mouth daily, Starting Wed 11/29/2017, No Print      QUEtiapine (SEROquel) 100 mg tablet Take 1 tablet by mouth daily at bedtime, Starting Tue 11/28/2017, No Print       !! - Potential duplicate medications found  Please discuss with provider  No discharge procedures on file      ED Provider  Electronically Signed by           Cristina Johnson DO  04/06/18 2769

## 2018-04-07 LAB — BACTERIA UR CULT: NORMAL

## 2018-05-10 ENCOUNTER — HOSPITAL ENCOUNTER (EMERGENCY)
Facility: HOSPITAL | Age: 58
Discharge: HOME | End: 2018-05-10
Attending: EMERGENCY MEDICINE | Admitting: EMERGENCY MEDICINE
Payer: MEDICARE

## 2018-05-10 ENCOUNTER — APPOINTMENT (EMERGENCY)
Dept: RADIOLOGY | Facility: HOSPITAL | Age: 58
End: 2018-05-10
Payer: MEDICARE

## 2018-05-10 VITALS
OXYGEN SATURATION: 98 % | DIASTOLIC BLOOD PRESSURE: 67 MMHG | TEMPERATURE: 98.1 F | SYSTOLIC BLOOD PRESSURE: 116 MMHG | RESPIRATION RATE: 18 BRPM | HEART RATE: 92 BPM

## 2018-05-10 DIAGNOSIS — R05.9 COUGH: Primary | ICD-10-CM

## 2018-05-10 LAB
ALBUMIN SERPL BCP-MCNC: 2.6 G/DL (ref 3.5–5)
ALP SERPL-CCNC: 92 U/L (ref 46–116)
ALT SERPL W P-5'-P-CCNC: 23 U/L (ref 12–78)
ANION GAP SERPL CALCULATED.3IONS-SCNC: 3 MMOL/L (ref 4–13)
AST SERPL W P-5'-P-CCNC: 20 U/L (ref 5–45)
BASOPHILS # BLD AUTO: 0 THOUSANDS/ΜL (ref 0–0.1)
BASOPHILS NFR BLD AUTO: 0 % (ref 0–1)
BILIRUB SERPL-MCNC: 0.3 MG/DL (ref 0.2–1)
BUN SERPL-MCNC: 21 MG/DL (ref 5–25)
CALCIUM SERPL-MCNC: 9.1 MG/DL (ref 8.3–10.1)
CHLORIDE SERPL-SCNC: 107 MMOL/L (ref 100–108)
CO2 SERPL-SCNC: 34 MMOL/L (ref 21–32)
CREAT SERPL-MCNC: 0.25 MG/DL (ref 0.6–1.3)
EOSINOPHIL # BLD AUTO: 0 THOUSAND/ΜL (ref 0–0.61)
EOSINOPHIL NFR BLD AUTO: 0 % (ref 0–6)
ERYTHROCYTE [DISTWIDTH] IN BLOOD BY AUTOMATED COUNT: 16.9 % (ref 11.6–15.1)
GFR SERPL CREATININE-BSD FRML MDRD: 159 ML/MIN/1.73SQ M
GLUCOSE SERPL-MCNC: 91 MG/DL (ref 65–140)
HCT VFR BLD AUTO: 44.2 % (ref 42–52)
HGB BLD-MCNC: 14.3 G/DL (ref 14–18)
LACTATE SERPL-SCNC: 1.3 MMOL/L (ref 0.5–2)
LYMPHOCYTES # BLD AUTO: 1.1 THOUSANDS/ΜL (ref 0.6–4.47)
LYMPHOCYTES NFR BLD AUTO: 10 % (ref 14–44)
MCH RBC QN AUTO: 27.7 PG (ref 27–31)
MCHC RBC AUTO-ENTMCNC: 32.3 G/DL (ref 31.4–37.4)
MCV RBC AUTO: 86 FL (ref 82–98)
MONOCYTES # BLD AUTO: 0.7 THOUSAND/ΜL (ref 0.17–1.22)
MONOCYTES NFR BLD AUTO: 6 % (ref 4–12)
NEUTROPHILS # BLD AUTO: 9.2 THOUSANDS/ΜL (ref 1.85–7.62)
NEUTS SEG NFR BLD AUTO: 84 % (ref 43–75)
NRBC BLD AUTO-RTO: 0 /100 WBCS
PLATELET # BLD AUTO: 156 THOUSANDS/UL (ref 130–400)
PMV BLD AUTO: 8.2 FL (ref 8.9–12.7)
POTASSIUM SERPL-SCNC: 4.2 MMOL/L (ref 3.5–5.3)
PROT SERPL-MCNC: 7.3 G/DL (ref 6.4–8.2)
RBC # BLD AUTO: 5.15 MILLION/UL (ref 4.7–6.1)
SODIUM SERPL-SCNC: 144 MMOL/L (ref 136–145)
WBC # BLD AUTO: 11 THOUSAND/UL (ref 4.8–10.8)

## 2018-05-10 PROCEDURE — 83605 ASSAY OF LACTIC ACID: CPT | Performed by: EMERGENCY MEDICINE

## 2018-05-10 PROCEDURE — 87040 BLOOD CULTURE FOR BACTERIA: CPT | Performed by: EMERGENCY MEDICINE

## 2018-05-10 PROCEDURE — 96360 HYDRATION IV INFUSION INIT: CPT

## 2018-05-10 PROCEDURE — 99284 EMERGENCY DEPT VISIT MOD MDM: CPT

## 2018-05-10 PROCEDURE — 71045 X-RAY EXAM CHEST 1 VIEW: CPT

## 2018-05-10 PROCEDURE — 96361 HYDRATE IV INFUSION ADD-ON: CPT

## 2018-05-10 PROCEDURE — 36415 COLL VENOUS BLD VENIPUNCTURE: CPT | Performed by: EMERGENCY MEDICINE

## 2018-05-10 PROCEDURE — 80053 COMPREHEN METABOLIC PANEL: CPT | Performed by: EMERGENCY MEDICINE

## 2018-05-10 PROCEDURE — 85025 COMPLETE CBC W/AUTO DIFF WBC: CPT | Performed by: EMERGENCY MEDICINE

## 2018-05-10 RX ORDER — GINSENG 100 MG
1 CAPSULE ORAL 2 TIMES DAILY
COMMUNITY
End: 2018-06-18

## 2018-05-10 RX ORDER — QUETIAPINE FUMARATE 25 MG/1
12.5 TABLET, FILM COATED ORAL 2 TIMES DAILY
COMMUNITY

## 2018-05-10 RX ORDER — FAMOTIDINE 40 MG/5ML
20 POWDER, FOR SUSPENSION ORAL DAILY
COMMUNITY

## 2018-05-10 RX ADMIN — SODIUM CHLORIDE 1000 ML: 0.9 INJECTION, SOLUTION INTRAVENOUS at 18:08

## 2018-05-10 NOTE — ED PROVIDER NOTES
History  Chief Complaint   Patient presents with    Cough     sent in from [facility], cough and congestion  x ray done at [facility] which showed pneumonia      61 yowm from [facility] sent in for cough, congestion x several days  Reportedly had xray there which showed pneumonia  Pt  With trach and they have been trying to wean him off it and they have been capping it off and on  No fever  No vomiting   + slight diarrhea  Caregiver from [facility] does not know why they sent him in rather than just treat the pneumonia  History provided by:  EMS personnel and caregiver  History limited by:  Patient nonverbal   used: No    Cough       Prior to Admission Medications   Prescriptions Last Dose Informant Patient Reported? Taking?    ARIPiprazole (ABILIFY) 30 mg tablet   Yes No   Sig: Take 30 mg by mouth daily   Multiple Vitamins-Minerals (MULTIVITAMIN ADULT PO)   Yes No   Sig: Take 1 tablet by mouth   QUEtiapine (SEROQUEL) 25 mg tablet   Yes Yes   Si 5 mg by Per G Tube route 2 (two) times a day   bacitracin topical ointment 500 units/g topical ointment   Yes Yes   Sig: Apply 1 large application topically 2 (two) times a day   divalproex sodium (DEPAKOTE SPRINKLE) 125 MG capsule   No No   Sig: Take 2 capsules by mouth daily at bedtime   Patient taking differently: Take 500 mg by mouth daily at bedtime     divalproex sodium (DEPAKOTE SPRINKLE) 125 MG capsule   No No   Sig: Take 6 capsules by mouth daily   Patient taking differently: Take 1,000 mg by mouth daily     divalproex sodium (DEPAKOTE SPRINKLE) 125 MG capsule   No No   Sig: Take 6 capsules by mouth daily after lunch   Patient taking differently: Take 1,000 mg by mouth daily after lunch     docusate (COLACE) 50 mg/5 mL liquid   Yes Yes   Si mg by Per G Tube route 2 (two) times a day   escitalopram (LEXAPRO) 20 mg tablet   Yes No   Sig: Take 20 mg by mouth daily   famotidine (PEPCID) 40 mg/5 mL suspension   Yes Yes   Si mg by Per G Tube route 2 (two) times a day   hydrocortisone 2 5 % cream   Yes Yes   Sig: Apply topically 4 (four) times a day as needed   metoprolol tartrate (LOPRESSOR) 25 mg tablet   Yes Yes   Si mg by Per G Tube route every 12 (twelve) hours   omeprazole (PriLOSEC) 20 mg delayed release capsule   Yes No   Sig: Take 20 mg by mouth daily in the early morning   polyethylene glycol (MIRALAX) 17 g packet   No No   Sig: Take 17 g by mouth daily      Facility-Administered Medications: None       Past Medical History:   Diagnosis Date    Deviated nasal septum     Eczema     Gastritis     Hyperlipidemia     Impulse control disorder     Insomnia     Mental retardation     Osteoarthritis     Psychiatric disorder     atypical psychosis, impulse control disorder    Ptosis of both eyelids     r > l    Scoliosis        Past Surgical History:   Procedure Laterality Date    CT BRONCHOSCOPY,DIAGNOSTIC N/A 2017    Procedure: BRONCHOSCOPY FLEXIBLE;  Surgeon: Lilo Flores MD;  Location: [place] [address] GI LAB; Service: Pulmonary    CT CYSTOURETHROSCOPY N/A 2017    Procedure: Chava Hedrick;  Surgeon: Hayley Pelletier MD;  Location: [address] [place] [address];  Service: Urology       History reviewed  No pertinent family history  I have reviewed and agree with the history as documented  Social History   Substance Use Topics    Smoking status: Never Smoker    Smokeless tobacco: Never Used    Alcohol use No        Review of Systems   Unable to perform ROS: Patient nonverbal   Respiratory: Positive for cough          Physical Exam  ED Triage Vitals [05/10/18 1733]   Temperature Pulse Respirations Blood Pressure SpO2   98 6 °F (37 °C) 86 16 106/55 97 %      Temp Source Heart Rate Source Patient Position - Orthostatic VS BP Location FiO2 (%)   Tympanic Monitor Lying Right arm --      Pain Score       --           Orthostatic Vital Signs  Vitals:    05/10/18 1733 05/10/18 2015 05/10/18 2044   BP: 106/55 116/55 116/67   Pulse: 86 85 92   Patient Position - Orthostatic VS: Lying         Physical Exam   Constitutional: No distress  Thin, appears chronically ill   HENT:   Head: Normocephalic and atraumatic  Mm dry   Eyes: Conjunctivae are normal  Pupils are equal, round, and reactive to light  No scleral icterus  Neck: Normal range of motion  Neck supple  Trach in place   Cardiovascular: Normal rate, regular rhythm and normal heart sounds  No murmur heard  Pulmonary/Chest: Effort normal and breath sounds normal  No respiratory distress  Abdominal: Soft  Bowel sounds are normal  He exhibits no distension  There is no tenderness  Peg tube in place   Musculoskeletal: Normal range of motion  He exhibits no edema, tenderness or deformity  Neurological: He is alert  No cranial nerve deficit  Skin: Skin is warm and dry  No rash noted  He is not diaphoretic  No erythema  Psychiatric: He has a normal mood and affect  His behavior is normal    Nursing note and vitals reviewed  ED Medications  Medications   sodium chloride 0 9 % bolus 1,000 mL (0 mL Intravenous Stopped 5/10/18 1950)       Diagnostic Studies  Results Reviewed     Procedure Component Value Units Date/Time    Lactic acid, plasma [[id number]]  (Normal) Collected:  05/10/18 1754    Lab Status:  Final result Specimen:  Blood from Arm, Left Updated:  05/10/18 1818     LACTIC ACID 1 3 mmol/L     Narrative:         Result may be elevated if tourniquet was used during collection      Comprehensive metabolic panel [[id number]]  (Abnormal) Collected:  05/10/18 1754    Lab Status:  Final result Specimen:  Blood from Arm, Left Updated:  05/10/18 1816     Sodium 144 mmol/L      Potassium 4 2 mmol/L      Chloride 107 mmol/L      CO2 34 (H) mmol/L      Anion Gap 3 (L) mmol/L      BUN 21 mg/dL      Creatinine 0 25 (L) mg/dL      Glucose 91 mg/dL      Calcium 9 1 mg/dL      AST 20 U/L      ALT 23 U/L      Alkaline Phosphatase 92 U/L      Total Protein 7 3 g/dL      Albumin 2 6 (L) g/dL      Total Bilirubin 0 30 mg/dL      eGFR 159 ml/min/1 73sq m     Narrative:         National Kidney Disease Education Program recommendations are as follows:  GFR calculation is accurate only with a steady state creatinine  Chronic Kidney disease less than 60 ml/min/1 73 sq  meters  Kidney failure less than 15 ml/min/1 73 sq  meters  CBC and differential [[id number]]  (Abnormal) Collected:  05/10/18 1754    Lab Status:  Final result Specimen:  Blood from Arm, Left Updated:  05/10/18 1815     WBC 11 00 (H) Thousand/uL      RBC 5 15 Million/uL      Hemoglobin 14 3 g/dL      Hematocrit 44 2 %      MCV 86 fL      MCH 27 7 pg      MCHC 32 3 g/dL      RDW 16 9 (H) %      MPV 8 2 (L) fL      Platelets 519 Thousands/uL      nRBC 0 /100 WBCs      Neutrophils Relative 84 (H) %      Lymphocytes Relative 10 (L) %      Monocytes Relative 6 %      Eosinophils Relative 0 %      Basophils Relative 0 %      Neutrophils Absolute 9 20 (H) Thousands/µL      Lymphocytes Absolute 1 10 Thousands/µL      Monocytes Absolute 0 70 Thousand/µL      Eosinophils Absolute 0 00 Thousand/µL      Basophils Absolute 0 00 Thousands/µL     Blood culture #2 [[id number]] Collected:  05/10/18 1754    Lab Status: In process Specimen:  Blood from Arm, Left Updated:  05/10/18 1759    Blood culture #1 [[id number]] Collected:  05/10/18 1754    Lab Status:  No result Specimen:  Blood from Arm, Left                  XR chest 1 view portable   Final Result by Lucius Laurent MD (05/10 2022)      Left lower lobe subsegmental atelectasis  No focal infiltrate or pleural effusion  Workstation performed: [id number]                    Procedures  Procedures       Phone Contacts  ED Phone Contact    ED Course                               MDM  Number of Diagnoses or Management Options  Cough:   Diagnosis management comments: Xray unchanged from previous showing atelectasis    I asked radiologist to read as well and he read xray as atelectasis and no pneumonia or fluid  Labs and vitals and exam ok  I don't see any indication for antibiotics or admission at this time  CritCare Time    Disposition  Final diagnoses:   Cough     Time reflects when diagnosis was documented in both MDM as applicable and the Disposition within this note     Time User Action Codes Description Comment    8/30/9539  3:93 PM Farzad ESPARZA Add [D37] Cough       ED Disposition     ED Disposition Condition Comment    Discharge  [address] discharge to home/self care  Condition at discharge: Stable        Follow-up Information     Follow up With Specialties Details Why Contact Info    Eleanor Shaw MD Internal Medicine   [phone]  [phone]          Patient's Medications   Discharge Prescriptions    No medications on file     No discharge procedures on file      ED Provider  Electronically Signed by           Maribel Espinoza MD  63/06/56 0385       Maribel Espinoza MD  03/32/96 4946

## 2018-05-11 NOTE — DISCHARGE INSTRUCTIONS
The patient had bloodwork which was ok, WBC was 11 0  Chest xray done here and read by the radiologist showed chronic left sided atelectasis but no pneumonia or fluid  Vital signs are normal, no fever, oxygen level good  I am not seeing any reason for the patient to be admitted to the hospital or put on antibiotics at this time  Please follow up with doctor if worsening or urgent changes or concerns  Otherwise, continue current care

## 2018-05-15 LAB — BACTERIA BLD CULT: NORMAL

## 2018-06-18 ENCOUNTER — HOSPITAL ENCOUNTER (EMERGENCY)
Facility: HOSPITAL | Age: 58
Discharge: HOME | End: 2018-06-18
Attending: EMERGENCY MEDICINE
Payer: MEDICARE

## 2018-06-18 VITALS
TEMPERATURE: 98.2 F | SYSTOLIC BLOOD PRESSURE: 121 MMHG | DIASTOLIC BLOOD PRESSURE: 71 MMHG | HEART RATE: 95 BPM | OXYGEN SATURATION: 95 % | RESPIRATION RATE: 18 BRPM

## 2018-06-18 DIAGNOSIS — R33.9 URINARY RETENTION: Primary | ICD-10-CM

## 2018-06-18 LAB
BACTERIA UR QL AUTO: ABNORMAL /HPF
BILIRUB UR QL STRIP: NEGATIVE
CLARITY UR: ABNORMAL
COLOR UR: ABNORMAL
GLUCOSE UR STRIP-MCNC: NEGATIVE MG/DL
HGB UR QL STRIP.AUTO: ABNORMAL
KETONES UR STRIP-MCNC: NEGATIVE MG/DL
LEUKOCYTE ESTERASE UR QL STRIP: ABNORMAL
NITRITE UR QL STRIP: NEGATIVE
NON-SQ EPI CELLS URNS QL MICRO: ABNORMAL /HPF
PH UR STRIP.AUTO: 7.5 [PH] (ref 5–9)
PROT UR STRIP-MCNC: ABNORMAL MG/DL
RBC #/AREA URNS AUTO: ABNORMAL /HPF
SP GR UR STRIP.AUTO: 1.02 (ref 1–1.03)
UROBILINOGEN UR QL STRIP.AUTO: 1 E.U./DL
WBC #/AREA URNS AUTO: ABNORMAL /HPF

## 2018-06-18 PROCEDURE — 87186 SC STD MICRODIL/AGAR DIL: CPT | Performed by: EMERGENCY MEDICINE

## 2018-06-18 PROCEDURE — 99284 EMERGENCY DEPT VISIT MOD MDM: CPT

## 2018-06-18 PROCEDURE — 87147 CULTURE TYPE IMMUNOLOGIC: CPT | Performed by: EMERGENCY MEDICINE

## 2018-06-18 PROCEDURE — 87077 CULTURE AEROBIC IDENTIFY: CPT | Performed by: EMERGENCY MEDICINE

## 2018-06-18 PROCEDURE — 87086 URINE CULTURE/COLONY COUNT: CPT | Performed by: EMERGENCY MEDICINE

## 2018-06-18 PROCEDURE — 81001 URINALYSIS AUTO W/SCOPE: CPT | Performed by: EMERGENCY MEDICINE

## 2018-06-18 RX ORDER — ALBUTEROL SULFATE 2.5 MG/3ML
2.5 SOLUTION RESPIRATORY (INHALATION) EVERY 6 HOURS PRN
COMMUNITY

## 2018-06-18 RX ORDER — DIPHENHYDRAMINE HCL 12.5MG/5ML
25 LIQUID (ML) ORAL 4 TIMES DAILY PRN
COMMUNITY

## 2018-06-18 RX ORDER — VALPROIC ACID 250 MG/5ML
2000 SOLUTION ORAL 2 TIMES DAILY
COMMUNITY

## 2018-06-18 NOTE — ED NOTES
Patient has attendant at bedside who reports that patient is content   Await [place] to  patient      Heather Alamo RN  06/18/18 5055

## 2018-06-18 NOTE — ED PROVIDER NOTES
History  Chief Complaint   Patient presents with    Urinary Retention     per BLS - patient had dill changed at 0400 this morning - states no urine drainage since then   nurse at [facility] attempted to flush dill without success, and when dill was removed to change, state a large amount of blood clots started coming out of the penis  13-year-old male from [facility] presents to the ED with complaints urinary retention from staff  They state they changed his catheter this morning and they have had some blood clots since then and then his urine stopped flowing in the catheter  No other complaints patient is nonverbal         History provided by:  Patient   used: No        Prior to Admission Medications   Prescriptions Last Dose Informant Patient Reported? Taking?    ARIPiprazole (ABILIFY) 30 mg tablet 2018 at Unknown time  Yes Yes   Si mg by Per G Tube route daily     Lactobacillus Acid-Pectin (ACIDOPHILUS/CITRUS PECTIN PO) 2018 at Unknown time  Yes Yes   Si tablet by Per G Tube route 2 (two) times a day   Multiple Vitamins-Minerals (MULTIVITAMIN ADULT PO) 2018 at Unknown time  Yes Yes   Si tablet by Per G Tube route daily     QUEtiapine (SEROQUEL) 25 mg tablet 2018 at Unknown time  Yes Yes   Si 5 mg by Per G Tube route 2 (two) times a day   Valproic Acid (DEPAKENE) 250 MG/5ML soln   Yes Yes   Sig: Take 2,000 mg by mouth 2 (two) times a day   albuterol (2 5 mg/3 mL) 0 083 % nebulizer solution   Yes Yes   Sig: Take 2 5 mg by nebulization every 6 (six) hours as needed for wheezing or shortness of breath   bisacodyl (FLEET) 10 MG/30ML ENEM   Yes Yes   Sig: Insert 10 mg into the rectum   diphenhydrAMINE (BENADRYL) 12 5 mg/5 mL elixir   Yes Yes   Si mg by Per G Tube route 4 (four) times a day as needed for itching   docusate (COLACE) 50 mg/5 mL liquid 2018 at Unknown time  Yes Yes   Si mg by Per G Tube route 2 (two) times a day     escitalopram (LEXAPRO) 20 mg tablet 2018 at Unknown time  Yes Yes   Si mg by Per G Tube route daily     famotidine (PEPCID) 40 mg/5 mL suspension 2018 at Unknown time  Yes Yes   Si mg by Per G Tube route daily     metoprolol tartrate (LOPRESSOR) 25 mg tablet 2018 at Unknown time  Yes Yes   Si mg by Per G Tube route daily at bedtime     polyethylene glycol (MIRALAX) 17 g packet 2018 at Unknown time  No Yes   Sig: Take 17 g by mouth daily      Facility-Administered Medications: None       Past Medical History:   Diagnosis Date    Deviated nasal septum     Eczema     Gastritis     Hyperlipidemia     Impulse control disorder     Insomnia     Mental retardation     Osteoarthritis     Psychiatric disorder     atypical psychosis, impulse control disorder    Ptosis of both eyelids     r > l    Scoliosis        Past Surgical History:   Procedure Laterality Date    [place] BRONCHOSCOPY,DIAGNOSTIC N/A 2017    Procedure: BRONCHOSCOPY FLEXIBLE;  Surgeon: Kandi Almanzar MD;  Location: [place] [address] GI LAB; Service: Pulmonary    [place] CYSTOURETHROSCOPY N/A 2017    Procedure: Guerline Chol;  Surgeon: Jovan Richard MD;  Location: [address] [place] [address];  Service: Urology       History reviewed  No pertinent family history  I have reviewed and agree with the history as documented  Social History   Substance Use Topics    Smoking status: Never Smoker    Smokeless tobacco: Never Used    Alcohol use No        Review of Systems   Constitutional: Negative for activity change, chills, diaphoresis and fever  HENT: Negative for congestion, ear pain, nosebleeds, sore throat, trouble swallowing and voice change  Eyes: Negative for pain, discharge and redness  Respiratory: Negative for apnea, cough, choking, shortness of breath, wheezing and stridor  Cardiovascular: Negative for chest pain and palpitations     Gastrointestinal: Negative for abdominal distention, abdominal pain, constipation, diarrhea, nausea and vomiting  Endocrine: Negative for polydipsia  Genitourinary: Positive for difficulty urinating and hematuria  Negative for dysuria, flank pain, frequency and urgency  Musculoskeletal: Negative for back pain, gait problem, joint swelling, myalgias, neck pain and neck stiffness  Skin: Negative for pallor and rash  Neurological: Negative for dizziness, tremors, syncope, speech difficulty, weakness, numbness and headaches  Hematological: Negative for adenopathy  Psychiatric/Behavioral: Negative for confusion, hallucinations, self-injury and suicidal ideas  The patient is not nervous/anxious  Physical Exam  Physical Exam   Constitutional: He is oriented to person, place, and time  Vital signs are normal  He appears well-developed and well-nourished  HENT:   Head: Normocephalic and atraumatic  Eyes: Conjunctivae and EOM are normal  Pupils are equal, round, and reactive to light  Neck: Normal range of motion  Neck supple  Cardiovascular: Normal rate, regular rhythm, normal heart sounds and intact distal pulses  Pulmonary/Chest: Effort normal and breath sounds normal    Abdominal: Soft  Bowel sounds are normal    Musculoskeletal: Normal range of motion  Neurological: He is alert and oriented to person, place, and time  Skin: Skin is warm  Nursing note and vitals reviewed        Vital Signs  ED Triage Vitals [06/18/18 1331]   Temperature Pulse Respirations Blood Pressure SpO2   98 2 °F (36 8 °C) 95 18 121/71 95 %      Temp Source Heart Rate Source Patient Position - Orthostatic VS BP Location FiO2 (%)   Temporal Monitor -- -- --      Pain Score       --           Vitals:    06/18/18 1331   BP: 121/71   Pulse: 95       Visual Acuity      ED Medications  Medications - No data to display    Diagnostic Studies  Results Reviewed     Procedure Component Value Units Date/Time    Urine Microscopic [[id number]]  (Abnormal) Collected:  06/18/18 7240    Lab Status:  Final result Specimen:  Urine from Urine, Indwelling Lundberg Catheter Updated:  06/18/18 1528     RBC, UA Innumerable (A) /hpf      WBC, UA Innumerable (A) /hpf      Epithelial Cells None Seen /hpf      Bacteria, UA Occasional /hpf     UA w Reflex to Microscopic [[id number]]  (Abnormal) Collected:  06/18/18 1459    Lab Status:  Final result Specimen:  Urine from Urine, Indwelling Lundberg Catheter Updated:  06/18/18 1506     Color, UA Light Yellow     Clarity, UA Cloudy     Specific [facility], UA 1 020     pH, UA 7 5     Leukocytes, UA Large (A)     Nitrite, UA Negative     Protein, UA 30 (1+) (A) mg/dl      Glucose, UA Negative mg/dl      Ketones, UA Negative mg/dl      Urobilinogen, UA 1 0 E U /dl      Bilirubin, UA Negative     Blood, UA Large (A)    Urine culture [[id number]] Collected:  06/18/18 1459    Lab Status: In process Specimen:  Urine from Urine, Indwelling Lundberg Catheter Updated:  06/18/18 1503                 No orders to display              Procedures  Procedures       Phone Contacts  ED Phone Contact    ED Course                               MDM  Number of Diagnoses or Management Options  Diagnosis management comments: We discussed the case with [facility] who states that would like to 3 way Lundberg catheter left in place in case he decides to bleed or have more clots they could irrigate  CritCare Time    Disposition  Final diagnoses:   Urinary retention     Time reflects when diagnosis was documented in both MDM as applicable and the Disposition within this note     Time User Action Codes Description Comment    6/18/2018  3:29 PM Mack Vela Add [R33 9] Urinary retention       ED Disposition     ED Disposition Condition Comment    Discharge  [address] discharge to home/self care      Condition at discharge: Stable        Follow-up Information     Follow up With Specialties Details Why Contact Clair Ryan MD Internal Medicine Schedule an appointment as soon as possible for a visit  [address] [facility] [address] [phone]            Patient's Medications   Discharge Prescriptions    No medications on file     No discharge procedures on file      ED Provider  Electronically Signed by           Jana Houser DO  06/18/18 5575

## 2018-06-18 NOTE — ED NOTES
Pt noted with throwing sheets and totes and hitting staff  Pt pants placed back on for comfort of pt  Lundberg draining pink tinged cloudy urine to gravity urimeter, with few small clots noted to tubing         Allyson Dominguez RN  06/18/18 9579

## 2018-06-18 NOTE — DISCHARGE INSTRUCTIONS
Urinary Retention in Men   WHAT YOU NEED TO KNOW:   Urinary retention is a condition that develops when your bladder does not empty completely when you urinate  DISCHARGE INSTRUCTIONS:   Medicines:   · Medicines  can help decrease the size of your prostate, fight infection, and help you urinate more easily  · Take your medicine as directed  Contact your healthcare provider if you think your medicine is not helping or if you have side effects  Tell him or her if you are allergic to any medicine  Keep a list of the medicines, vitamins, and herbs you take  Include the amounts, and when and why you take them  Bring the list or the pill bottles to follow-up visits  Carry your medicine list with you in case of an emergency  Lundberg catheter care: You may need a Lundberg catheter for up to 2 weeks at home  Healthcare providers will give you a smaller leg bag to collect urine  Keep the bag below your waist  This will prevent urine from flowing back into your bladder and causing an infection or other problems  Also, keep the tube free of kinks so the urine will drain properly  Do not pull on the catheter  This can cause pain and bleeding, and may cause the catheter to come out  Ask your healthcare provider or urologist for more information on Lundberg catheter care  Urinate regularly:  When your catheter is removed, do not let your bladder become too full before you urinate  Set regular times each day to urinate  Urinate as soon as you feel the need or at least every 3 hours while you are awake  Do not drink liquids before you go to bed  Urinate right before you go to bed  Follow up with your healthcare provider or urologist as directed:  Write down your questions so you remember to ask them during your visits  Contact your healthcare provider or urologist if:   · You have a fever  · You have pain when you urinate  · You have blood in your urine  · You have problems with your catheter      · You have questions or concerns about your condition or care  Return to the emergency department if:   · You have severe abdominal pain  · You are breathing faster than usual     · Your heartbeat is faster than usual     · Your face, hands, feet, or ankles are swollen  © 2017 [employer] Information is for End User's use only and may not be sold, redistributed or otherwise used for commercial purposes  All illustrations and images included in CareNotes® are the copyrighted property of A D A M , Inc  or [employer]  The above information is an  only  It is not intended as medical advice for individual conditions or treatments  Talk to your doctor, nurse or pharmacist before following any medical regimen to see if it is safe and effective for you

## 2018-06-18 NOTE — ED NOTES
[address] [place] [address] called to confirm ok to send pt back to [address] [place] [address] with three way dill clamped  Rema Preciado, Nursing supervisor, reportedly confirmed with his [address] [place] [address] supervisor, and is OK to send pt back to [address] [place] [address] with 3-way dill in         Shelby Loera RN  06/18/18 9233

## 2018-06-18 NOTE — ED NOTES
Elio here to return patient to [address] [place] [address]   ED summary sent with patient      April Herrera RN  06/18/18 4744

## 2018-06-20 LAB
BACTERIA UR CULT: ABNORMAL
BACTERIA UR CULT: ABNORMAL

## 2019-02-04 ENCOUNTER — APPOINTMENT (EMERGENCY)
Dept: RADIOLOGY | Facility: HOSPITAL | Age: 59
End: 2019-02-04
Payer: MEDICARE

## 2019-02-04 ENCOUNTER — HOSPITAL ENCOUNTER (EMERGENCY)
Facility: HOSPITAL | Age: 59
Discharge: NURSING, REHAB OR LONG-TERM CARE | End: 2019-02-05
Attending: EMERGENCY MEDICINE | Admitting: EMERGENCY MEDICINE
Payer: MEDICARE

## 2019-02-04 VITALS
TEMPERATURE: 95.3 F | HEART RATE: 60 BPM | OXYGEN SATURATION: 95 % | RESPIRATION RATE: 20 BRPM | SYSTOLIC BLOOD PRESSURE: 97 MMHG | DIASTOLIC BLOOD PRESSURE: 56 MMHG

## 2019-02-04 DIAGNOSIS — S62.309A METACARPAL BONE FRACTURE: Primary | ICD-10-CM

## 2019-02-04 PROCEDURE — 99283 EMERGENCY DEPT VISIT LOW MDM: CPT

## 2019-02-04 PROCEDURE — 73130 X-RAY EXAM OF HAND: CPT

## 2019-02-05 NOTE — ED PROVIDER NOTES
History  Chief Complaint   Patient presents with    Finger Swelling     pt presents to the ed from [facility] for swelling of the right index finger and metacarpal  Upon traige no discernable amount was swelling was noted, unable to assess if painful to touch      59 yo male from [facility] sent in for swelling of left hand and index finger that they just noticed tonight  No known or witnessed injury but pt  Does occasionally fling his arms around so he may have had an injury  Nothing else different with him tonight per the aide present  No fever, vomiting, trouble breathing  History provided by:  Nursing home and caregiver   used: No        Prior to Admission Medications   Prescriptions Last Dose Informant Patient Reported? Taking?    ARIPiprazole (ABILIFY) 30 mg tablet   Yes No   Si mg by Per G Tube route daily     Lactobacillus Acid-Pectin (ACIDOPHILUS/CITRUS PECTIN PO)   Yes No   Si tablet by Per G Tube route 2 (two) times a day   Multiple Vitamins-Minerals (MULTIVITAMIN ADULT PO)   Yes No   Si tablet by Per G Tube route daily     QUEtiapine (SEROQUEL) 25 mg tablet   Yes No   Si 5 mg by Per G Tube route 2 (two) times a day   Valproic Acid (DEPAKENE) 250 MG/5ML soln   Yes No   Sig: Take 2,000 mg by mouth 2 (two) times a day   albuterol (2 5 mg/3 mL) 0 083 % nebulizer solution   Yes No   Sig: Take 2 5 mg by nebulization every 6 (six) hours as needed for wheezing or shortness of breath   bisacodyl (FLEET) 10 MG/30ML ENEM   Yes No   Sig: Insert 10 mg into the rectum   diphenhydrAMINE (BENADRYL) 12 5 mg/5 mL elixir   Yes No   Si mg by Per G Tube route 4 (four) times a day as needed for itching   docusate (COLACE) 50 mg/5 mL liquid   Yes No   Si mg by Per G Tube route 2 (two) times a day     escitalopram (LEXAPRO) 20 mg tablet   Yes No   Si mg by Per G Tube route daily     famotidine (PEPCID) 40 mg/5 mL suspension   Yes No   Si mg by Per G Tube route daily metoprolol tartrate (LOPRESSOR) 25 mg tablet   Yes No   Si mg by Per G Tube route daily at bedtime     polyethylene glycol (MIRALAX) 17 g packet   No No   Sig: Take 17 g by mouth daily      Facility-Administered Medications: None       Past Medical History:   Diagnosis Date    Deviated nasal septum     Eczema     Gastritis     Hyperlipidemia     Impulse control disorder     Insomnia     Mental retardation     Osteoarthritis     Psychiatric disorder     atypical psychosis, impulse control disorder    Ptosis of both eyelids     r > l    Scoliosis        Past Surgical History:   Procedure Laterality Date    [place] BRONCHOSCOPY,DIAGNOSTIC N/A 2017    Procedure: BRONCHOSCOPY FLEXIBLE;  Surgeon: Jovanna Bob MD;  Location: [place] [address] GI LAB; Service: Pulmonary    UT CYSTOURETHROSCOPY N/A 2017    Procedure: Ty Breech;  Surgeon: Gisel Rudolph MD;  Location: [address] [place] [address];  Service: Urology       History reviewed  No pertinent family history  I have reviewed and agree with the history as documented  Social History   Substance Use Topics    Smoking status: Never Smoker    Smokeless tobacco: Never Used    Alcohol use No        Review of Systems   Unable to perform ROS: Patient nonverbal       Physical Exam  Physical Exam   Constitutional: No distress  HENT:   Head: Normocephalic and atraumatic  Neck:   trach   Pulmonary/Chest: Effort normal    Abdominal: Soft  Peg tube    Musculoskeletal: He exhibits edema  + sts index finger MCP joint, unable to elicit any pain response with ttp  Radial pulse palp  Wrist no swelling  Neurological:   contractured   Skin: Skin is warm and dry  Capillary refill takes less than 2 seconds  He is not diaphoretic  No erythema  Psychiatric: He has a normal mood and affect  His behavior is normal    Nursing note and vitals reviewed        Vital Signs  ED Triage Vitals [19]   Temperature Pulse Respirations Blood Pressure SpO2   (!) 95 3 °F (35 2 °C) 60 20 97/56 95 %      Temp Source Heart Rate Source Patient Position - Orthostatic VS BP Location FiO2 (%)   Tympanic Monitor -- Left arm --      Pain Score       --           Vitals:    02/04/19 2037   BP: 97/56   Pulse: 60       Visual Acuity      ED Medications  Medications - No data to display    Diagnostic Studies  Results Reviewed     None                 XR hand 3+ views LEFT   ED Interpretation by Asya Bueno MD (29/89 3488)   + comminuted fracture, ? Callous formation                 Procedures  Static Splint Application  Date/Time: 2/4/2019 10:31 PM  Performed by: Albert De Souza by: Juan Ott     Patient location:  ED  Procedure performed by emergency physician: Yes    Other Assisting Provider: No    Universal protocol:     Patient identity confirmed:  Arm band  Indication:     Indications: fracture    Procedure details:     Laterality:  Left    Location:  Hand    Hand:  L hand    Strapping: no      Splint type:  Volar short arm and wrist    Supplies:  Ortho-Glass, cotton padding and elastic bandage  Post-procedure details:     Neurovascular Exam: skin pink, capillary refill <2 sec and skin intact, warm, and dry      Patient tolerance of procedure: Tolerated well, no immediate complications           Phone Contacts  ED Phone Contact    ED Course                               MDM  Number of Diagnoses or Management Options  Metacarpal bone fracture:   Diagnosis management comments: Xray reviewed and pt  Has comminuted fracture of distal second metacarpal at MCP joint but it looks like there is callous around it so I'm not sure it is acute  Will splint and advised pt  Should see orthopedist and radiology will read xray tomorrow        Disposition  Final diagnoses:   Metacarpal bone fracture     Time reflects when diagnosis was documented in both MDM as applicable and the Disposition within this note     Time User Action Codes Description Comment    2/4/2019  9:41 PM Pam Killian Add [O62 226X] Metacarpal bone fracture       ED Disposition     ED Disposition Condition Date/Time Comment    Discharge  Mon Feb 4, 2019  9:41 PM Angely Blake discharge to home/self care  Condition at discharge: Stable        Follow-up Information     Follow up With Specialties Details Why Contact Info    Aline Dumont MD Orthopedic Surgery Schedule an appointment as soon as possible for a visit  [address] [place] [address]  [phone]            Patient's Medications   Discharge Prescriptions    No medications on file     No discharge procedures on file      ED Provider  Electronically Signed by           Amrit Calixto MD  11/82/67 3142       Amrit Calixto MD  43/28/76 4765       Amrit Calixto MD  22/86/36 5424

## 2019-02-05 NOTE — DISCHARGE INSTRUCTIONS
Hand Fracture - comminuted fracture of second distal metacarpal bone - however there appears to be some callous formation around fracture which implies that the fracture is subacute - possibly a couple weeks ago? The radiologist will review the xray tomorrow and we'll see what they think but the patient should have an orthopedist consult and you can have him wear the splint until seen by orthopedics  WHAT YOU NEED TO KNOW:   A hand fracture is a break in one of the bones in your hand  This includes the bones in the wrist and fingers, and those that connect the wrist to the fingers  A hand fracture may be caused by twisting or bending the hand in the wrong way  It may also be caused by a fall, a crush injury, or a sports injury  DISCHARGE INSTRUCTIONS:   Return to the emergency department if:   · Your have severe pain that does not get better, even with pain medicine  · Your injured hand or forearm is cold, numb, or pale  · Your cast or splint gets wet, damaged, or comes off  · Your arm feels warm, tender, and painful  It may look swollen and red  Contact your healthcare provider if:   · You have new sores around your brace, cast, or splint  · You notice a bad smell coming from under your cast     · You have questions or concerns about your condition or care  Medicines:   · NSAIDs , such as ibuprofen, help decrease swelling, pain, and fever  This medicine is available with or without a doctor's order  NSAIDs can cause stomach bleeding or kidney problems in certain people  If you take blood thinner medicine, always ask your healthcare provider if NSAIDs are safe for you  Always read the medicine label and follow directions  · Acetaminophen  decreases pain and fever  It is available without a doctor's order  Ask how much to take and how often to take it  Follow directions  Acetaminophen can cause liver damage if not taken correctly  · Prescription pain medicine  may be given   Ask how to take this medicine safely  · Take your medicine as directed  Contact your healthcare provider if you think your medicine is not helping or if you have side effects  Tell him or her if you are allergic to any medicine  Keep a list of the medicines, vitamins, and herbs you take  Include the amounts, and when and why you take them  Bring the list or the pill bottles to follow-up visits  Carry your medicine list with you in case of an emergency  Follow up with your healthcare provider or hand specialist as directed: You may need to return to have your cast, splint, or stitches removed  Write down your questions so you remember to ask them during your visits  Manage your symptoms:   · Wear your splint as directed  Do not remove the splint until you follow up with your healthcare provider or hand specialist      · Apply ice  on your hand for 15 to 20 minutes every hour or as directed  Use an ice pack, or put crushed ice in a plastic bag  Cover it with a towel before you apply it to your skin  Ice helps prevent tissue damage and decreases swelling and pain  · Elevate  your hand above the level of his or her heart as often as you can  This will help decrease swelling and pain  Prop your hand on pillows or blankets to keep it elevated comfortably  · Go to physical therapy as directed  A physical therapist teaches you exercises to help improve movement and strength and to decrease pain  Bathing with a cast or splint:  Do not let your cast or splint get wet  Before bathing, cover the cast or splint with a plastic bag  Tape the bag to your skin above the cast or splint to seal out the water  Keep your hand out of the water in case the bag leaks  Follow instructions about when it is okay to take a bath or shower  Cast or splint care:   · Check the skin around the cast or splint for redness or sores every day  · Do not push down or lean on any part of the cast or splint because it may break      · Do not use a sharp or pointed object to scratch your skin under the cast or splint  Activity:  You may not be able to drive for up to 2 weeks  Ask when it is safe for you to drive and return to other activities such as sports  © 2017 [employer] Information is for End User's use only and may not be sold, redistributed or otherwise used for commercial purposes  All illustrations and images included in CareNotes® are the copyrighted property of A D A M , Inc  or [employer]  The above information is an  only  It is not intended as medical advice for individual conditions or treatments  Talk to your doctor, nurse or pharmacist before following any medical regimen to see if it is safe and effective for you
